# Patient Record
Sex: FEMALE | Employment: STUDENT | URBAN - METROPOLITAN AREA
[De-identification: names, ages, dates, MRNs, and addresses within clinical notes are randomized per-mention and may not be internally consistent; named-entity substitution may affect disease eponyms.]

---

## 2023-03-23 ENCOUNTER — ATHLETIC TRAINING SESSION (OUTPATIENT)
Dept: SPORTS MEDICINE | Facility: CLINIC | Age: 23
End: 2023-03-23
Payer: COMMERCIAL

## 2023-03-23 NOTE — PROGRESS NOTES
Date: 3/20/23 Initial Evaluation  SUBJECTIVE:  Annmarie Ruiz is a 22 y.o. female who complains of tightness and pain in her right shoulder. Mechanism of injury: No RISHI, no immediate symptoms. Symptoms have been intermittent since that time. Prior history of related problems: no prior problems with this area in the past, previous shoulder injury general tightness in posterior shoulder, right side of her neck and upper arm.4/10    OBJECTIVE:  Appearance: alert, well appearing, and in no distress.  Shoulder exam: soft tissue tenderness over bicipital groove, tightness of trapezius, pectoralis major, and SCM, contralateral shoulder exam is normal.    ASSESSMENT:  Overall shoulder muscle tightness.    PLAN:  rest the injured area as much as practical, stretch the affected area.    NEFTALI Bonilla, STEPHANY    [facility]  [email or web address]      Date: 3/21/23 Re-Eval  SUBJECTIVE:  Annmarie Ruiz is a 22 y.o. female who complains of tightness and pain in her right shoulder. Mechanism of injury: No RISHI, no immediate symptoms. Symptoms have been intermittent since that time. Prior history of related problems: no prior problems with this area in the past, previous shoulder injury general tightness in posterior shoulder, right side of her neck and upper arm.4/10 pain. Pt states bruising over pectoralis muscle is due to soft tissue mobilizations done with a tennis ball on her own.    OBJECTIVE:  Appearance: alert, well appearing, and in no distress.  Shoulder exam: soft tissue tenderness, swelling at the insertion of the pectoralis and bicipital groove, ecchymosis of pectoralis major and bicipital groove, bicipital groove tenderness.    ASSESSMENT:  Shoulder strain    PLAN:  Limit repetitions at practice, ice afterwards and be evaluated by  on 3/22/23.     NEFTALI Bonilla, STEPHANY    [facility]  [place]  [email or web address] [facility]     Athletic Training Daily Treatment Note   3/23/2023  Name: Annmarie Ruiz  Clinic Number: [id number]  Sport: Women's Tennis    Reason For Visit: Pec Strain  Affected Area: Right Shoulder    Ramya completed the following exercises, treatments, and modalities.    Modality/Manual Therapy Time   Game ready 10 min                             Exercise Reps/Sets/Time Weight #   Scapular wall slides x20    Scapula squeezes x20    Scapula in pockets x20    I,Y,T no weight on physio ball 3x8                                                   -A shoulder spica was applied but the athlete did not feel comfortable playing with it therefore, KT tape was applied pulling the shoulder into IR and ADD.    Ligia Restrepo, LAT, ATC    [facility]  [email or web address]

## 2023-03-28 ENCOUNTER — ATHLETIC TRAINING SESSION (OUTPATIENT)
Dept: SPORTS MEDICINE | Facility: CLINIC | Age: 23
End: 2023-03-28
Payer: COMMERCIAL

## 2023-03-28 DIAGNOSIS — M75.41 ROTATOR CUFF IMPINGEMENT SYNDROME OF RIGHT SHOULDER: Primary | ICD-10-CM

## 2023-03-28 NOTE — PROGRESS NOTES
[facility]     Athletic Training Daily Treatment Note   3/27/23  Name: Annmarie Ruiz  Clinic Number: [id number]  Sport: Women's Tennis    Reason For Visit: pec strain  Affected Area: Right Shoulder    Ramya completed the following exercises, treatments, and modalities.       Modality/Manual Therapy Time   Heat (pre) 20 min   Combo US & stim (post) 8min                            Exercise Reps/Sets/Time Weight #   Scapular wall slides x20    Scapula squeezes x20    I,Y,T on physioball 3x8    Seated banded row 3x10    Bird dogs 3x10    Scapula in pockets x20                                       Pt reported she experiences pain with sleeping, typing, writing, and serving the tennis ball. Pain patterns have moved to the medial border of scapula including the rhomboids and trapezius.   Plan: shut her down from all practices and matches until pain decreased and improvement is seen. I relayed the news to her  and he is against the plan and is aware that if he plays her AMA I can no longer help her injury if it worsens.       NEFTALI Bonilla, STEPHANY    [email or web address]  [email or web address]      3/28/23  Pt stated her pain is better but her exercises make it worse.  Ramya received the following  modalities and/or manual therapy techniques:    Modality/Manual Therapy Time   E-stim IFC    E-stim premod    NormateRUST     IASTM-    Cupping-  10 min   Heat-  20 min   Scraping-    GameReady-         NEFTALI Bonilla, STEPHANY    [email or web address]  [email or web address]

## 2023-03-29 NOTE — PROGRESS NOTES
3/29/2023 Assessment performed by Dr. Luna.   HPI:  Patient presents for right shoulder pain and a pectoralis strain sustained last week.     Physical exam:   She has visible ecchymosis on the anterior portion of her shoulder. Her ROM is WNL and experiences pain with external rotation and horizontal ADD. TTP over the distal border of the scapula, along the insertion of the pectoralis muscle, long head of bicep.  Strength and special tests were performed by Dr. Luna.     Assessment:  Rotator cuff impingement, subscapularis     Plan:  Pt is able to play this weekend at her matches with limited reps and limited serves.   Continue with shoulder maintenance rehab and treat pain for remainder of the season.   Assess muscle and mechanical imbalances once the season is over and focus on overcompensation issues.     NEFTALI Bonilla, ATC    [facility]  [facility]

## 2023-04-19 NOTE — PROGRESS NOTES
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 She reports resting until this past week when she started to serve again and had return of her right shoulder pain. She reports the pain originates in her neck and down her shoulder. It makes it difficult to turn her head to the right.       Physical Exam:  TTP in supraspinatus and subscapularis fossa.  ROM full with painful arc at 170 degrees in forward flexion and 130 degrees in abduction, and is limited with pain at T12 on the right.   Special tests: positive for empty can and neer's    MUSCULOSKELETAL EXAM:    TART (Tissue texture abnormality, Asymmetry,  Restriction of motion and/or Tenderness) changes:     Cervical Spine   C1 Neutral   C2 ERS LEFT   C3 ERS LEFT   C4 Neutral   C5 Neutral   C6 Neutral   C7 Neutral      Thoracic Spine   T1 Neutral   T2 Neutral   T3 FRS RIGHT   T4 Neutral   T5 Neutral   T6 FRS RIGHT   T7 Neutral   T8 Neutral   T9 FRS LEFT   T10 Neutral   T11 Neutral   T12 Neutral     Rib cage:      Lumbar Spine   L1 Neutral   L2 ERS LEFT   L3 Neutral   L4 Neutral   L5 ERS RIGHT       Key   F= Flexed   E = Extended   R = Rotated   S = Sidebent   TTA = tissue texture abnormality     Assessment:  Chronic Right Shoulder Pain (likely Shoulder Impingement)    Plan:   Discussed the long term fix of correcting biomechanics in physical therapy over the summer either here or back home depending on where she is for the summer. Today, performed OMT with good results and improvement in symptoms. Advised modifying activity and not serving until conference matchers next week and if unable to overhand serve then return to underhand serve. Agree with continuing conservative measures of heating pads, stretching, and advised warm  showers to relax the muscle.           This note was completed in the presence of the physician noted above

## 2023-05-03 ENCOUNTER — OFFICE VISIT (OUTPATIENT)
Dept: SPORTS MEDICINE | Facility: CLINIC | Age: 23
End: 2023-05-03
Payer: COMMERCIAL

## 2023-05-03 ENCOUNTER — HOSPITAL ENCOUNTER (OUTPATIENT)
Dept: RADIOLOGY | Facility: HOSPITAL | Age: 23
Discharge: HOME | End: 2023-05-03
Attending: ORTHOPAEDIC SURGERY
Payer: COMMERCIAL

## 2023-05-03 VITALS
BODY MASS INDEX: 26.5 KG/M2 | WEIGHT: 144 LBS | SYSTOLIC BLOOD PRESSURE: 113 MMHG | HEIGHT: 62 IN | HEART RATE: 70 BPM | DIASTOLIC BLOOD PRESSURE: 64 MMHG

## 2023-05-03 DIAGNOSIS — S43.431A SUPERIOR GLENOID LABRUM LESION OF RIGHT SHOULDER, INITIAL ENCOUNTER: ICD-10-CM

## 2023-05-03 DIAGNOSIS — M75.41 INTERNAL IMPINGEMENT OF RIGHT SHOULDER: Primary | ICD-10-CM

## 2023-05-03 DIAGNOSIS — M25.511 RIGHT SHOULDER PAIN, UNSPECIFIED CHRONICITY: ICD-10-CM

## 2023-05-03 PROCEDURE — 73030 XR SHOULDER COMPLETE 2 OR MORE VIEWS RIGHT: ICD-10-PCS | Mod: 26,RT,, | Performed by: RADIOLOGY

## 2023-05-03 PROCEDURE — 99204 PR OFFICE/OUTPT VISIT, NEW, LEVL IV, 45-59 MIN: ICD-10-PCS | Mod: S$GLB,,, | Performed by: ORTHOPAEDIC SURGERY

## 2023-05-03 PROCEDURE — 99204 OFFICE O/P NEW MOD 45 MIN: CPT | Mod: S$GLB,,, | Performed by: ORTHOPAEDIC SURGERY

## 2023-05-03 PROCEDURE — 73030 X-RAY EXAM OF SHOULDER: CPT | Mod: TC,RT

## 2023-05-03 PROCEDURE — 99999 PR PBB SHADOW E&M-EST. PATIENT-LVL III: CPT | Mod: PBBFAC,,, | Performed by: ORTHOPAEDIC SURGERY

## 2023-05-03 PROCEDURE — 99999 PR PBB SHADOW E&M-EST. PATIENT-LVL III: ICD-10-PCS | Mod: PBBFAC,,, | Performed by: ORTHOPAEDIC SURGERY

## 2023-05-03 PROCEDURE — 73030 X-RAY EXAM OF SHOULDER: CPT | Mod: 26,RT,, | Performed by: RADIOLOGY

## 2023-05-03 NOTE — PROGRESS NOTES
NEW PATIENT    HISTORY OF PRESENT ILLNESS   22 y.o. 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 She started having pain in November. She states she was doing exercises with bands and the pain started to go away. She had posterior shoulder pain. She states she also has neck pain. She has most pain during her serve during the late cocking phase. She has started top have pain again during matches and practice. She also has pain with her liliane in the late cocking phase.        Is affecting ADLs.  Pain is 7/10 at it's worst.        PAST MEDICAL HISTORY    History reviewed. No pertinent past medical history.    PAST SURGICAL HISTORY     Past Surgical History:   Procedure Laterality Date    FUSION, JOINT, WRIST Right     right       FAMILY HISTORY    History reviewed. No pertinent family history.    SOCIAL HISTORY    Social History     Socioeconomic History    Marital status: Single       MEDICATIONS    No current outpatient medications on file.    ALLERGIES     Review of patient's allergies indicates:  No Known Allergies      REVIEW OF SYSTEMS   Constitution: Negative. Negative for chills, fever and night sweats.   HENT: Negative for congestion and headaches.    Eyes: Negative for blurred vision, left vision loss and right vision loss.   Cardiovascular: Negative for chest pain and syncope.   Respiratory: Negative for cough and shortness of breath.    Endocrine: Negative for polydipsia, polyphagia and polyuria.   Hematologic/Lymphatic: Negative for bleeding problem. Does not bruise/bleed easily.   Skin: Negative for dry skin, itching and rash.   Musculoskeletal: Negative for falls. Positive for right shoulder pain  Gastrointestinal: Negative for abdominal pain and bowel incontinence.   Genitourinary: Negative for bladder incontinence and nocturia.   Neurological: Negative for disturbances in coordination, loss of balance and seizures.  "  Psychiatric/Behavioral: Negative for depression. The patient does not have insomnia.    Allergic/Immunologic: Negative for hives and persistent infections.     PHYSICAL EXAMINATION    Vitals: /64   Pulse 70   Ht 5' 2" (1.575 m)   Wt 65.3 kg (144 lb)   BMI 26.34 kg/m²     General: The patient appears active and healthy with no apparent physical problems.  No disturbance of mood or affect is demonstrated. Alert and Oriented.    HEENT: Eyes normal, pupils equally round, nose normal.    Resp: Equal and symmetrical chest rises. No wheezing    CV: Regular rate    Neck: Supple; nonpainful range of motion.    Extremities: no cyanosis, clubbing, edema, or diffuse swelling.  Palpable pulses, good capillary refill of the digits.  No coolness, discoloration, edema or obvious varicosities.    Skin: no lesions noted.    Lymphatic: no detected adenopathy in the upper or lower extremities.    Neurologic: normal mental status, normal reflexes, normal gait and balance.  Patient is alert and oriented to person, place and time.  No flaccidity or spasticity is noted.  No motor or sensory deficits are noted.  Light touch is intact    Orthopaedic:     SHOULDER EXAM - RIGHT    Inspection:   Normal skin color and appearance with no scars.  No muscle atrophy noted.  No scapular winging.      Palpation: No tenderness of the acromioclavicular joint, lateral edge of the acromion, biceps tendon, trapezius muscle or scapulothoracic bursa.  Tender around the posterior aspect of the shoulder.    ROM:      PROM:     FE - 180°    Abd/ER -  90°  Abd/IR -  45°   Add/ER -  60°     AROM:    FE - 180°    Abd/ER -  90°  Abd/IR -  45°   Add/ER -  60°  *pain ER       Tests:     - Yeung, - Neer's, - Cross Arm Adduction, + Paul[place], - Yerguson, - Speed. - Belly Press,  - Jobes *pain, - Lift Off    Stability: - sulcus, - apprehension, + relocation, - load and shift, - DLS      Motor:  Rotator cuff strength is 5/5 supraspinatus, 5/5 infraspinatus, " 5/5 subscapularis. Biceps, triceps and deltoid strength is 5/5.      Neuro     Distally there are no paresthesias, and sensation is intact to light touch in the median, ulnar, and radial distributions.  Reflexes are 2/2 biceps, triceps and brachioradialis.        IMAGING    X-ray Shoulder 2 or More Views Right  EXAMINATION:  XR SHOULDER COMPLETE 2 OR MORE VIEWS RIGHT    CLINICAL HISTORY:  Pain in right shoulder    FINDINGS:  Three views right shoulder: No fracture, dislocation, or bone destruction seen.    Electronically signed by: Venkat Arias MD  Date:    05/03/2023  Time:    14:05        IMPRESSION       ICD-10-CM ICD-9-CM   1. Internal impingement of right shoulder  M75.41 726.2   2. Right shoulder pain, unspecified chronicity  M25.511 719.41   3. Superior glenoid labrum lesion of right shoulder, initial encounter  S43.431A 840.7       MEDICATIONS PRESCRIBED      None    RECOMMENDATIONS     MRA of right shoulder ordered today with ABER view  RTC after MRA      All questions were answered, pt will contact us for questions or concerns in the interim.

## 2023-09-07 RX ORDER — AZITHROMYCIN 1 G/1
1 POWDER, FOR SUSPENSION ORAL ONCE
Qty: 1 PACKET | Refills: 0 | Status: CANCELLED | OUTPATIENT
Start: 2023-09-07 | End: 2023-09-07

## 2023-09-07 RX ORDER — AZITHROMYCIN 250 MG/1
TABLET, FILM COATED ORAL
Qty: 6 TABLET | Refills: 0 | Status: SHIPPED | OUTPATIENT
Start: 2023-09-07

## 2023-11-02 NOTE — PROGRESS NOTES
"CC: left foot pain    23 y.o. Female presents today for evaluation of her left foot pain. 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 She reports she was playing in a tennis match 3 weeks ago and following the match she felt a significant pain in her foot along the medial arch. She was evaluated in the training room and was placed in a wrong size walking boot for about a week which worsened symptoms. She then had improvement in symptoms with a better fitting walking boot but notes an increase in symptoms with associated cracking and swelling at her midfoot after continuous walking in [place] for a school trip. She is currently wearing a tall walking boot given to her by her school . When asked where her pain is located, she gestured along the plantar and dorsal aspects of her medial longitudinal arch, navicular and 1st metatarsal bones. She report her pain is now radiating into her great toe with associated numbness. She describes her pain as sharp and "pressure."    How long: Patient admits to experiencing left foot pain for the past 3 weeks  What makes it better: Patient admits to decreased pain with ice and rest  What makes it worse: Patient admits to increased pain with weightbearing and calf raises  Does it radiate: Patient admits to radiating pain into her great toe  Attempted treatments: Patient admits to the following attempted treatments: ice, tall walking boot and rest  Pain score: Patient admits to a pain score of 9/10   History of trauma/injury: Patient denies history of trauma/injury  Affecting ADLs: Patient admits to her pain affecting her ability to perform her ADLs  Any mechanical symptoms: Patient denies mechanical symptoms  Feelings of instability: Patient denies feelings of instability    PAST MEDICAL HISTORY:   No past medical history on file.    PAST SURGICAL HISTORY:   Past Surgical History:   Procedure Laterality Date    FUSION, JOINT, WRIST Right     right " "    FAMILY HISTORY:   No family history on file.    SOCIAL HISTORY:   Social History     Socioeconomic History    Marital status: Single     MEDICATIONS:   Current Outpatient Medications:     azithromycin (Z-NAGI) 250 MG tablet, Take 2 tablets by mouth on day 1; Take 1 tablet by mouth on days 2-5, Disp: 6 tablet, Rfl: 0    ALLERGIES:   Review of patient's allergies indicates:  No Known Allergies     PHYSICAL EXAMINATION:  /75   Pulse 73   Ht 5' 2" (1.575 m)   Wt 66.1 kg (145 lb 11.6 oz)   BMI 26.65 kg/m²   Vitals signs and nursing note have been reviewed.  General: In no acute distress, well developed, well nourished, no diaphoresis  Eyes: EOM full and smooth, no eye redness or discharge  HENT: normocephalic and atraumatic, neck supple, trachea midline, no nasal discharge, no external ear redness or discharge  Cardiovascular: no LE edema  Lungs: respirations non-labored, no conversational dyspnea   Neuro: alert & oriented   Skin: No rashes, warm and dry  Psychiatric: cooperative, pleasant, mood and affect appropriate for age  Msk: see below    ANKLE/FOOT: left   The affected ankle is compared to the contralateral ankle.    Observation:    There is no edema, erythema, or ecchymosis.   Inability to weight bear, shifts weight to her contralateral foot.   Abnormal, antalgic gait with attempted weight bearing.     ROM: (expected degree)  Active dorsiflexion to 20° bilaterally (*reproduces medial midfoot pain on the left*).    Active plantarflexion to 50° bilaterally (*reproduces medial midfoot pain on the left*).    Active ankle inversion to 35° bilaterally.    Active ankle eversion to 15° bilaterally (*reproduces medial midfoot pain on the left*).    Full active flexion/extension of the toes bilaterally.   Heel cords without tightness bilaterally.    Tenderness To Palpation:  Tenderness at the navicular bone  Tenderness along the 1st metatarsal shaft, worse at the proximal base  Tenderness along the course of " the posterior tibialis tendon onto its insertion    No tenderness at the ATFL, CFL, PTFL, or deltoid ligaments  No tenderness over the distal anterior syndesmosis, distal tibia/fibula, fibular head/shaft  No tenderness at medial or lateral malleoli   No tenderness at the Achilles tendon calcaneal insertion    Strength Testing:  Dorsiflexion - 5/5 on the right and 3/5 on the left (*reproduces medial midfoot pain on the left*)  Platarflexion - 5/5 on the right and 3/5 on the left (*reproduces medial midfoot pain on the left*)  Resisted Inversion - 5/5 on the right and 2/5 on the left (*reproduces medial midfoot pain on the left*)  Resisted Eversion - 5/5 on the right and 3/5 on the left (*reproduces medial midfoot pain on the left*)    Special Tests:  Anterior talar drawer - negative and without dimpling  Talar tilt - negative    Metatarsal squeeze test - positive  Midfoot stress test - positive    IMAGIN. X-ray ordered, 11/3/23, due to left foot pain  2. X-ray images were interpreted personally by me and then reviewed directly with patient.  3. My interpretation of imaging is no acute bony fracture or abnormality. No joint dislocation. No soft tissue swelling.    ASSESSMENT:      ICD-10-CM ICD-9-CM   1. 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 She now has inability to weight bear and localized pain at the navicular/1st metatarsal bone. Today's exam is concerning for a stress fracture at the navicular or 1st metatarsal bone and she will require an MRI of the left midfoot to definitively diagnose. Please see detailed plan below.    XRs ordered in the office today and images were personally interpreted and then reviewed with the patient. See above for further  detail.    2.   MRI of the left midfoot ordered to assess the above concerning pathology.     3.   Patient fitted for and provided crutches to assist with ambulation. Patient advised not to weight bear and use the crutches as assistance as she now has pain with ambulating in her walking boot and there is concern for a stress fracture. Additionally, patient fitted for an appropriate sized non-pneumatic tall walking boot to help immobilize the foot while using the crutches.    4.   Discussed pain management options, offered prescription of Meloxicam but patient deferred at this time and will continue with her current anti-inflammatory regimen as needed.    5.   Follow-up once MRI results obtained in the training room or sooner if needed.    All questions were answered to the best of my ability and all concerns were addressed at this time.

## 2023-11-03 ENCOUNTER — HOSPITAL ENCOUNTER (OUTPATIENT)
Dept: RADIOLOGY | Facility: HOSPITAL | Age: 23
Discharge: HOME | End: 2023-11-03
Attending: STUDENT IN AN ORGANIZED HEALTH CARE EDUCATION/TRAINING PROGRAM
Payer: COMMERCIAL

## 2023-11-03 ENCOUNTER — OFFICE VISIT (OUTPATIENT)
Dept: SPORTS MEDICINE | Facility: CLINIC | Age: 23
End: 2023-11-03
Payer: COMMERCIAL

## 2023-11-03 VITALS
BODY MASS INDEX: 26.82 KG/M2 | HEIGHT: 62 IN | DIASTOLIC BLOOD PRESSURE: 75 MMHG | HEART RATE: 73 BPM | SYSTOLIC BLOOD PRESSURE: 115 MMHG | WEIGHT: 145.75 LBS

## 2023-11-03 DIAGNOSIS — M79.672 ACUTE FOOT PAIN, LEFT: ICD-10-CM

## 2023-11-03 DIAGNOSIS — M79.672 ACUTE FOOT PAIN, LEFT: Primary | ICD-10-CM

## 2023-11-03 PROCEDURE — 99999 PR PBB SHADOW E&M-EST. PATIENT-LVL III: ICD-10-PCS | Mod: PBBFAC,,, | Performed by: STUDENT IN AN ORGANIZED HEALTH CARE EDUCATION/TRAINING PROGRAM

## 2023-11-03 PROCEDURE — 99999 PR PBB SHADOW E&M-EST. PATIENT-LVL III: CPT | Mod: PBBFAC,,, | Performed by: STUDENT IN AN ORGANIZED HEALTH CARE EDUCATION/TRAINING PROGRAM

## 2023-11-03 PROCEDURE — 73630 X-RAY EXAM OF FOOT: CPT | Mod: 26,LT,, | Performed by: RADIOLOGY

## 2023-11-03 PROCEDURE — 73630 XR FOOT COMPLETE 3 VIEW LEFT: ICD-10-PCS | Mod: 26,LT,, | Performed by: RADIOLOGY

## 2023-11-03 PROCEDURE — 99204 OFFICE O/P NEW MOD 45 MIN: CPT | Mod: S$GLB,,, | Performed by: STUDENT IN AN ORGANIZED HEALTH CARE EDUCATION/TRAINING PROGRAM

## 2023-11-03 PROCEDURE — 73630 X-RAY EXAM OF FOOT: CPT | Mod: TC,LT

## 2023-11-03 PROCEDURE — 99204 PR OFFICE/OUTPT VISIT, NEW, LEVL IV, 45-59 MIN: ICD-10-PCS | Mod: S$GLB,,, | Performed by: STUDENT IN AN ORGANIZED HEALTH CARE EDUCATION/TRAINING PROGRAM

## 2023-11-05 ENCOUNTER — HOSPITAL ENCOUNTER (OUTPATIENT)
Dept: RADIOLOGY | Facility: HOSPITAL | Age: 23
Discharge: HOME | End: 2023-11-05
Attending: STUDENT IN AN ORGANIZED HEALTH CARE EDUCATION/TRAINING PROGRAM
Payer: COMMERCIAL

## 2023-11-05 DIAGNOSIS — M79.672 ACUTE FOOT PAIN, LEFT: ICD-10-CM

## 2023-11-05 PROCEDURE — 73718 MRI LOWER EXTREMITY W/O DYE: CPT | Mod: TC,LT

## 2023-11-05 PROCEDURE — 73718 MRI FOOT (MIDFOOT) LEFT WITHOUT CONTRAST: ICD-10-PCS | Mod: 26,LT,, | Performed by: RADIOLOGY

## 2023-11-05 PROCEDURE — 73718 MRI LOWER EXTREMITY W/O DYE: CPT | Mod: 26,LT,, | Performed by: RADIOLOGY

## 2023-11-07 NOTE — PROGRESS NOTES
Writer called patient to inform her of her recent MRI results. Will start medrol dose pack to help decrease inflammation/pain. All questions answered.

## 2023-12-05 ENCOUNTER — ATHLETIC TRAINING SESSION (OUTPATIENT)
Dept: SPORTS MEDICINE | Facility: CLINIC | Age: 23
End: 2023-12-05
Payer: COMMERCIAL

## 2023-12-05 DIAGNOSIS — M79.672 LEFT FOOT PAIN: Primary | ICD-10-CM

## 2023-12-05 NOTE — PROGRESS NOTES
Objective:       General: Annmarie is well-developed, well-nourished, appears stated age, in no acute distress, alert and oriented to time, place and person.     Ramya completed:    [x]  INJURY TREATMENT   []  MAINTENANCE  DATE OF SERVICE: 12/4/2023  INJURY/CONDITON: Left Foot Weakness    Ramya received the selected modalities after being cleared for contradictions.  Ramya received education on potenital side effects of the selected modalities and agreed to treatment.      THERAPEUTIC EXERCISES:    Stretching Cardio Rehab Other   [x]Stretching - Active [x]Cardio - Bike [x]Rehab - Ankle/Foot []Agility []PNF   []Stretching - Dynamic []Cardio - Elliptical []Rehab - Knee []Balance []ROM - Active   []Stretching - Passive []Cardio - Jog/Run []Rehab - Hip []Blood Flow Restriction []ROM - Passive   []Stretching - PNF []Cardio - Treadmill []Rehab - Wrist/Hand [x]Foam Roller []RTP - Concussion Protocol   []Stretching - Static []Cardio - Upper Body Ergometer []Rehab - Elbow []Functional Exercises []RTP - Sport Specific    []Cardio - Walk []Rehab - Shoulder []Joint Mobilization []Strengthening Exercises     []Rehab - Neck/Spine []Manual Therapy []Other:     []Rehab - Back []Plyometric Exercises      []Rehab - Other       Comment:            Warm-Up Reps/Sets/Time Weight #   Bike 10 min    Foam Roll -- calf     Stretch -- calf 5 x 20s           Exercise Reps/Sets/Time Weight #   DL Calf Raise 4 x 15    SL Calf Raise 3 x 10    Toe Raises 3 x 15    DL Horizontal Line Hop 4 x 35s    DL Lateral Line Hop 4 x 35s    SL Horizontal Line Hop 4 x 35s    SL Lateral Line Hop 4 x 35s                     Comment:        Assessment:     Status: AT - Cleared to Exert    Date Seen:  12/4/2023    Date Out:  n/a    Date Cleared:  n/a      Plan:       1. Continue rehab in training room  2. Physician Referral: yes  3. ED Referral: no  4. Parent/Guardian Notified: No  5. All questions were answered, ath. will contact me for questions or  concerns in  the interim.  6.         Eligible to use School Insurance: Yes

## 2023-12-11 ENCOUNTER — OFFICE VISIT (OUTPATIENT)
Dept: SPORTS MEDICINE | Facility: CLINIC | Age: 23
End: 2023-12-11
Payer: COMMERCIAL

## 2023-12-11 VITALS
SYSTOLIC BLOOD PRESSURE: 115 MMHG | BODY MASS INDEX: 26.51 KG/M2 | WEIGHT: 144.06 LBS | HEIGHT: 62 IN | HEART RATE: 67 BPM | DIASTOLIC BLOOD PRESSURE: 66 MMHG

## 2023-12-11 DIAGNOSIS — M77.52 BURSITIS OF INTERMETATARSAL BURSA OF LEFT FOOT: Primary | ICD-10-CM

## 2023-12-11 PROCEDURE — 99999 PR PBB SHADOW E&M-EST. PATIENT-LVL III: ICD-10-PCS | Mod: PBBFAC,,, | Performed by: STUDENT IN AN ORGANIZED HEALTH CARE EDUCATION/TRAINING PROGRAM

## 2023-12-11 PROCEDURE — 20600 PR DRAIN/INJECT SMALL JOINT/BURSA: ICD-10-PCS | Mod: LT,S$GLB,, | Performed by: STUDENT IN AN ORGANIZED HEALTH CARE EDUCATION/TRAINING PROGRAM

## 2023-12-11 PROCEDURE — 99214 OFFICE O/P EST MOD 30 MIN: CPT | Mod: 25,S$GLB,, | Performed by: STUDENT IN AN ORGANIZED HEALTH CARE EDUCATION/TRAINING PROGRAM

## 2023-12-11 PROCEDURE — 99214 PR OFFICE/OUTPT VISIT, EST, LEVL IV, 30-39 MIN: ICD-10-PCS | Mod: 25,S$GLB,, | Performed by: STUDENT IN AN ORGANIZED HEALTH CARE EDUCATION/TRAINING PROGRAM

## 2023-12-11 PROCEDURE — 20600 DRAIN/INJ JOINT/BURSA W/O US: CPT | Mod: LT,S$GLB,, | Performed by: STUDENT IN AN ORGANIZED HEALTH CARE EDUCATION/TRAINING PROGRAM

## 2023-12-11 PROCEDURE — 99999 PR PBB SHADOW E&M-EST. PATIENT-LVL III: CPT | Mod: PBBFAC,,, | Performed by: STUDENT IN AN ORGANIZED HEALTH CARE EDUCATION/TRAINING PROGRAM

## 2023-12-11 RX ORDER — TRIAMCINOLONE ACETONIDE 40 MG/ML
40 INJECTION, SUSPENSION INTRA-ARTICULAR; INTRAMUSCULAR
Status: COMPLETED | OUTPATIENT
Start: 2023-12-11 | End: 2023-12-11

## 2023-12-11 RX ADMIN — TRIAMCINOLONE ACETONIDE 40 MG: 40 INJECTION, SUSPENSION INTRA-ARTICULAR; INTRAMUSCULAR at 01:12

## 2023-12-11 NOTE — PROGRESS NOTES
"CC: left foot pain    Ramya is here today for a follow up evaluation of her left foot pain. She reports a pain score of 6-7/10. She admits to compliance with wearing her short walking boot for 4-5 weeks. She denies any pain or issues with her boot. She reports pain improvement with her walking boot. She reports her pain has improved and she has been doing rehabilitation in the training room with her school . She reports she still experiences pain with prolonged walking. She reports her pain stops when she is able to "crack" for foot. Her pain is located within the intermetatarsal region between the 1st and 2nd metatarsal heads on the plantar aspect of the foot.    Recall from visit on 11/3/23  23 y.o. Female presents today for evaluation of her left foot pain. 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. She reports she was playing in a tennis match 3 weeks ago and following the match she felt a significant pain in her foot along the medial arch. She was evaluated in the training room and was placed in a wrong size walking boot for about a week which worsened symptoms. She then had improvement in symptoms with a better fitting walking boot but notes an increase in symptoms with associated cracking and swelling at her midfoot after continuous walking in [place] for a school trip. She is currently wearing a tall walking boot given to her by her school . When asked where her pain is located, she gestured along the plantar and dorsal aspects of her medial longitudinal arch, navicular and 1st metatarsal bones. She report her pain is now radiating into her great toe with associated numbness. She describes her pain as sharp and "pressure."    How long: Patient admits to experiencing left foot pain for the past 3 weeks  What makes it better: Patient admits to decreased pain with ice and rest  What makes it worse: Patient admits to increased pain with weightbearing and calf " raises  Does it radiate: Patient admits to radiating pain into her great toe  Attempted treatments: Patient admits to the following attempted treatments: ice, tall walking boot and rest  Pain score: Patient admits to a pain score of 9/10   History of trauma/injury: Patient denies history of trauma/injury  Affecting ADLs: Patient admits to her pain affecting her ability to perform her ADLs  Any mechanical symptoms: Patient denies mechanical symptoms  Feelings of instability: Patient denies feelings of instability    PAST MEDICAL HISTORY:   No past medical history on file.    PAST SURGICAL HISTORY:   Past Surgical History:   Procedure Laterality Date    FUSION, JOINT, WRIST Right     right     FAMILY HISTORY:   No family history on file.    SOCIAL HISTORY:   Social History     Socioeconomic History    Marital status: Single     Social Determinants of Health     Financial Resource Strain: Medium Risk (12/11/2023)    Overall Financial Resource Strain (CARDIA)     Difficulty of Paying Living Expenses: Somewhat hard   Food Insecurity: Food Insecurity Present (12/11/2023)    Hunger Vital Sign     Worried About Running Out of Food in the Last Year: Often true     Ran Out of Food in the Last Year: Never true   Transportation Needs: No Transportation Needs (12/11/2023)    PRAPARE - Transportation     Lack of Transportation (Medical): No     Lack of Transportation (Non-Medical): No   Physical Activity: Sufficiently Active (12/11/2023)    Exercise Vital Sign     Days of Exercise per Week: 6 days     Minutes of Exercise per Session: 70 min   Stress: No Stress Concern Present (12/11/2023)    [place] [employer] - Occupational Stress Questionnaire     Feeling of Stress : Not at all   Social Connections: Unknown (12/11/2023)    Social Connection and Isolation Panel [NHANES]     Frequency of Communication with Friends and Family: More than three times a week     Frequency of Social Gatherings with Friends and  "Family: More than three times a week     Active Member of Clubs or Organizations: Yes     Attends Club or Organization Meetings: More than 4 times per year     Marital Status: Never    Housing Stability: High Risk (12/11/2023)    Housing Stability Vital Sign     Unable to Pay for Housing in the Last Year: No     Number of Places Lived in the Last Year: 3     Unstable Housing in the Last Year: No     MEDICATIONS:   Current Outpatient Medications:     azithromycin (Z-NAGI) 250 MG tablet, Take 2 tablets by mouth on day 1; Take 1 tablet by mouth on days 2-5 (Patient not taking: Reported on 11/3/2023), Disp: 6 tablet, Rfl: 0    ALLERGIES:   Review of patient's allergies indicates:  No Known Allergies     PHYSICAL EXAMINATION:  /66   Pulse 67   Ht 5' 2" (1.575 m)   Wt 65.4 kg (144 lb 1.1 oz)   BMI 26.35 kg/m²   Vitals signs and nursing note have been reviewed.  General: In no acute distress, well developed, well nourished, no diaphoresis  Eyes: EOM full and smooth, no eye redness or discharge  HENT: normocephalic and atraumatic, neck supple, trachea midline, no nasal discharge, no external ear redness or discharge  Cardiovascular: no LE edema  Lungs: respirations non-labored, no conversational dyspnea   Neuro: alert & oriented   Skin: No rashes, warm and dry  Psychiatric: cooperative, pleasant, mood and affect appropriate for age  Msk: see below    ANKLE/FOOT: left   The affected ankle is compared to the contralateral ankle.    Observation:    There is no edema, erythema, or ecchymosis.   Normal gait (but reports pain with ambulation at the intermetatarsal bursa)    ROM: (expected degree)  Active dorsiflexion to 20° bilaterally  Active plantarflexion to 50° bilaterally  Active ankle inversion to 35° bilaterally.    Active ankle eversion to 15° bilaterally   Full active flexion/extension of the toes bilaterally.   Heel cords without tightness bilaterally.    Tenderness To Palpation:  Tenderness within the " "intermetatarsal space, at the bursa between the 1st and 2nd metatarsal heads.    Resolution of tenderness at the navicular bone  Resolution of tenderness along the 1st metatarsal shaft, worse at the proximal base  Resolution of tenderness along the course of the posterior tibialis tendon onto its insertion    No tenderness at the ATFL, CFL, PTFL, or deltoid ligaments  No tenderness over the distal anterior syndesmosis, distal tibia/fibula, fibular head/shaft  No tenderness at medial or lateral malleoli   No tenderness at the Achilles tendon calcaneal insertion    Strength Testing:  Dorsiflexion - 5/5 on the right and 5/5 on the left   Platarflexion - 5/5 on the right and 5/5 on the left   Resisted Inversion - 5/5 on the right and 5/5 on the left  Resisted Eversion - 5/5 on the right and 5/5 on the left     Special Tests:  Metatarsal squeeze test - ? positive  Daniel's sign test - negative    Functional Testing:  Calf raises - positive for reproduction of pain within the intermetatarsal space between the 1st and 2nd metatarsal heads    PROCEDURE:  Small joint injection  Intermetatarsal bursal injection of bursa between 1st and 2nd metatarsal heads (left foot)  Performed by: Cresencio Luna  Authorized by: Cresencio Luna   Consent Done?: Yes (Verbal and written)  Indications: Pain, at intermetatarsal bursa  Site marked: The procedure site was marked   Timeout: Prior to procedure the correct patient, procedure, and site was verified   Location: Intermetatarsal bursa, between 1st and 2nd metatarsal heads, left  Prep: Patient was prepped with Chlorhexidine and alcohol.  Skin anesthetic: Ethyl Chloride spray was used prior to skin puncture.  Ultrasound Guidance for needle placement: yes  Needle size: 27G, 1  Approach: Plantar  Procedure: A 27G 1" needle was used to enter the foot, adjacent to the intermetatarsal bursa under US guidance. A 1 cc mixture of 0.5 cc of 40 mg/ml triamcinolone acetonide and 0.5 cc of 0.1% " "lidocaine was injected into the left intermetatarsal bursa, between the 1st and 2nd metatarsal heads.  Medications: 40 mg triamcinolone acetonide 40 mg/mL  Patient tolerance: Patient tolerated the procedure well with no immediate complications    Description of ultrasound utilization for needle guidance:    Ultrasound guidance was used for needle localization with Pharmapod Edge 2, 9-L MHz linear probe(s). Images were saved and stored for documentation. Plantar foot vasculature was identified prior to injection. The left intermetatarsal bursa was well visualized. Dynamic visualization of the 27G 1" needle was continuous throughout the procedure and maintained good position and correct needle placement.      Triamcinolone:  NDC: [address]-1  LOT: [id number]  EXP: 2025     IMAGIN. X-ray previously obtained, 11/3/23, due to left foot pain  2. X-ray images were interpreted personally by me and then reviewed directly with patient.  3. My interpretation of imaging is no acute bony fracture or abnormality. No joint dislocation. No soft tissue swelling.    1. MRI previously obtained on 23 due to left foot pain  2. MRI images were reviewed personally by me and then directly with patient.  3. FINDINGS: LIGAMENTS: Dorsal and interosseous components of the Lisfranc ligament are intact.  Dorsal talonavicular, Lisfranc and dorsal calcaneocuboid ligaments are normal. TENDONS: Mild increased intrasubstance signal of the distal peroneus longus tendon with associated peritendinous edema/fluid and mild soft tissue edema tracking distally along the inferomedial aspect of the 1st metatarsal shaft.  Remaining visualized flexor and extensor tendons are normal BONES: No fractures.  No avascular necrosis.  No marrow infiltrative process. JOINTS/CARTILAGE: No high-grade partial or full-thickness chondral defects.  No osteochondral lesions.  Intact MTP collateral ligaments. MUSCLES: Normal bulk and signal intensity. MISCELLANEOUS: " Visualized plantar aponeurosis is normal.  Hallux plantar plate complex and lesser MTP plantar plates are normal.  Mild fluid distention of the 3rd intermetatarsal bursa.  4. IMPRESSION: Strain versus tendinosis/tenosynovitis of the distal peroneus longus with soft tissue edema tracking distally along the inferomedial aspect of the 1st metatarsal       Comments: I have personally reviewed and previously interpreted the imaging and I agree with the above radiology report.    ASSESSMENT:      ICD-10-CM ICD-9-CM   1. 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 Today's exam reflects improvement in symptoms s/p walking boot and rehabilitation in training room. However, she continues to endorse intermetatarsal bursa pain in between her 1st and 2nd metatarsal heads with prolonged walking/activity despite extensive rehabilitation in the training room. After discussion of treatment options, patient opted to try a intermetatarsal bursal injection. She tolerated the procedure well with improvement in symptoms. Please see detailed plan below.    Patient underwent intermetatarsal bursal injection of her left foot as detailed above. She tolerated the procedure well with improvement in symptoms.    2.   Patient advised to refrain from running or lower extremity exercise for the next 24-72 hours to allow the medicine/fullness sensation to improve. She should modified and do upper extremity activity at this time. Will reassess in the training room on Wednesday, 12/13/23 and likely clear to resume activity as tolerated pending her clinical exam.     3.   Patient advised she can continue rehabilitation in the training room, non-weight beating exercises  at this time until she is reassess on 12/13/23. Once cleared she can resume full rehabilitation as tolerated.     4.   Follow-up on 12/11/23 as detailed above in the training room or sooner if needed.    All questions were answered to the best of my ability and all concerns were addressed at this time.

## 2023-12-12 ENCOUNTER — TELEPHONE (OUTPATIENT)
Dept: SPORTS MEDICINE | Facility: CLINIC | Age: 23
End: 2023-12-12
Payer: COMMERCIAL

## 2023-12-12 NOTE — TELEPHONE ENCOUNTER
"Writer called patient to follow-up on symptoms s/p ultrasound guided injection. Patient reports fullness sensation post injection and "weird" feeling but not necessary pain as she previously appreciated. Will assess tomorrow in the training room. All questions answered.   "

## 2024-03-26 ENCOUNTER — ATHLETIC TRAINING SESSION (OUTPATIENT)
Dept: SPORTS MEDICINE | Facility: CLINIC | Age: 24
End: 2024-03-26
Payer: COMMERCIAL

## 2024-03-26 DIAGNOSIS — M25.511 RIGHT SHOULDER PAIN, UNSPECIFIED CHRONICITY: Primary | ICD-10-CM

## 2024-03-26 NOTE — PROGRESS NOTES
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    HPI  Patient notified AT of pain in right shoulder during practice on 3/25/2024. Pt stated that on Friday 03/22/24 that she had numbness and tingling down the right arm and possibly dislocated shoulder during tennis event in [place].    Review of Systems   All other systems reviewed and are negative.                Objective:       General: Annmarie is well-developed, well-nourished, appears stated age, in no acute distress, alert and oriented to time, place and person.             Back (L-Spine & T-Spine) / Neck (C-Spine) Exam     Tenderness   The patient is tender to palpation of the right trapezial and right scapular.   Right Shoulder Exam     Tenderness   The patient is tender to palpation of the supraspinatus.    Range of Motion   Active abduction:  normal   Passive abduction:  normal   Extension:  normal   Forward Flexion:  abnormal   Forward Elevation: abnormal  Adduction: abnormal    Tests & Signs   Drop arm: positive  Lift Off Sign: positive    Comments:  - Pt has forward shoulder roll with resting position    - Pt possibly has thoracic compression outlet syndrome    Left Shoulder Exam   Left shoulder exam is normal.    Muscle Strength   The patient has normal left shoulder strength.       Muscle Strength   Right Upper Extremity   Shoulder Abduction: 4/5   Shoulder Internal Rotation: 5/5   Shoulder External Rotation: 5/5   Supraspinatus: 4/5   Subscapularis: 2/5   Biceps: 5/5   Triceps:  5/5    Dermatomes:   Left  C4: negative  C5: negative  C6: negative  C7: negative  C8: negative  T1: negative   Right  C4: negative  C5: negtaive  C6 negative  C7: negative  C8: negative  T1: negative     Myotomes:  Left  C4: negative  C5: negative  C6: negative  C7: negative  C8: negative  T1: negative  Right  C4: negative  C5: negative  C6:  negative  C7: negative  C8: negative  T1: negative       Special Test:   - Jerk Test : negative (bilateral)  - Axial Load: negative (bilateral)  - Arm Drop: (L) negative (R) positive  - Sulcus: negative (bilateral)   MMT:  - Internal rotation - 5/5 (bilateral)  - External rotation - 5/5 ( bilateral)       Assessment:     Left Shoulder Impingement    Status: F - Full Participation    Date Seen:  3/26/24    Date of Injury:  03/22/24    Date Out:  n/a    Date Cleared:  n/a      Plan:       1. See Doctor on 4/3/24 , strengthen rotator cuff , relax shoulder & neck muscles  2. Physician Referral: yes  3. ED Referral:no  4. Parent/Guardian Notified: No  5. All questions were answered, ath. will contact me for questions or concerns in  the interim.  6.         Eligible to use School Insurance: Yes

## 2024-04-03 DIAGNOSIS — M25.511 CHRONIC RIGHT SHOULDER PAIN: Primary | ICD-10-CM

## 2024-04-03 DIAGNOSIS — G89.29 CHRONIC RIGHT SHOULDER PAIN: Primary | ICD-10-CM

## 2024-05-02 DIAGNOSIS — M25.511 RIGHT SHOULDER PAIN, UNSPECIFIED CHRONICITY: Primary | ICD-10-CM

## 2024-05-09 ENCOUNTER — CLINICAL SUPPORT (OUTPATIENT)
Dept: REHABILITATION | Facility: HOSPITAL | Age: 24
End: 2024-05-09
Attending: STUDENT IN AN ORGANIZED HEALTH CARE EDUCATION/TRAINING PROGRAM
Payer: COMMERCIAL

## 2024-05-09 DIAGNOSIS — M54.2 NECK PAIN, CHRONIC: ICD-10-CM

## 2024-05-09 DIAGNOSIS — G89.29 CHRONIC RIGHT SHOULDER PAIN: Primary | ICD-10-CM

## 2024-05-09 DIAGNOSIS — M25.511 CHRONIC RIGHT SHOULDER PAIN: Primary | ICD-10-CM

## 2024-05-09 DIAGNOSIS — G89.29 NECK PAIN, CHRONIC: ICD-10-CM

## 2024-05-09 DIAGNOSIS — R29.3 ABNORMAL POSTURE: ICD-10-CM

## 2024-05-09 DIAGNOSIS — M25.511 RIGHT SHOULDER PAIN, UNSPECIFIED CHRONICITY: ICD-10-CM

## 2024-05-09 PROCEDURE — 97162 PT EVAL MOD COMPLEX 30 MIN: CPT

## 2024-05-09 PROCEDURE — 97112 NEUROMUSCULAR REEDUCATION: CPT

## 2024-05-09 PROCEDURE — 97140 MANUAL THERAPY 1/> REGIONS: CPT

## 2024-05-10 ENCOUNTER — OFFICE VISIT (OUTPATIENT)
Dept: SPORTS MEDICINE | Facility: CLINIC | Age: 24
End: 2024-05-10
Payer: COMMERCIAL

## 2024-05-10 ENCOUNTER — TELEPHONE (OUTPATIENT)
Dept: SPORTS MEDICINE | Facility: CLINIC | Age: 24
End: 2024-05-10
Payer: COMMERCIAL

## 2024-05-10 ENCOUNTER — HOSPITAL ENCOUNTER (OUTPATIENT)
Dept: RADIOLOGY | Facility: HOSPITAL | Age: 24
Discharge: HOME | End: 2024-05-10
Attending: STUDENT IN AN ORGANIZED HEALTH CARE EDUCATION/TRAINING PROGRAM
Payer: COMMERCIAL

## 2024-05-10 VITALS
HEIGHT: 62 IN | HEART RATE: 71 BPM | BODY MASS INDEX: 25.66 KG/M2 | SYSTOLIC BLOOD PRESSURE: 116 MMHG | WEIGHT: 139.44 LBS | DIASTOLIC BLOOD PRESSURE: 72 MMHG

## 2024-05-10 DIAGNOSIS — M25.511 RIGHT SHOULDER PAIN, UNSPECIFIED CHRONICITY: ICD-10-CM

## 2024-05-10 DIAGNOSIS — M25.511 CHRONIC RIGHT SHOULDER PAIN: Primary | ICD-10-CM

## 2024-05-10 DIAGNOSIS — G54.0 BRACHIAL PLEXUS NEUROPATHY OF RIGHT UPPER EXTREMITY: ICD-10-CM

## 2024-05-10 DIAGNOSIS — G89.29 CHRONIC RIGHT SHOULDER PAIN: Primary | ICD-10-CM

## 2024-05-10 PROCEDURE — 3078F DIAST BP <80 MM HG: CPT | Mod: CPTII,S$GLB,, | Performed by: STUDENT IN AN ORGANIZED HEALTH CARE EDUCATION/TRAINING PROGRAM

## 2024-05-10 PROCEDURE — 3074F SYST BP LT 130 MM HG: CPT | Mod: CPTII,S$GLB,, | Performed by: STUDENT IN AN ORGANIZED HEALTH CARE EDUCATION/TRAINING PROGRAM

## 2024-05-10 PROCEDURE — 99999 PR PBB SHADOW E&M-EST. PATIENT-LVL III: CPT | Mod: PBBFAC,,, | Performed by: STUDENT IN AN ORGANIZED HEALTH CARE EDUCATION/TRAINING PROGRAM

## 2024-05-10 PROCEDURE — 73030 X-RAY EXAM OF SHOULDER: CPT | Mod: TC,RT

## 2024-05-10 PROCEDURE — 1160F RVW MEDS BY RX/DR IN RCRD: CPT | Mod: CPTII,S$GLB,, | Performed by: STUDENT IN AN ORGANIZED HEALTH CARE EDUCATION/TRAINING PROGRAM

## 2024-05-10 PROCEDURE — 99215 OFFICE O/P EST HI 40 MIN: CPT | Mod: 25,S$GLB,, | Performed by: STUDENT IN AN ORGANIZED HEALTH CARE EDUCATION/TRAINING PROGRAM

## 2024-05-10 PROCEDURE — 73030 X-RAY EXAM OF SHOULDER: CPT | Mod: 26,RT,, | Performed by: RADIOLOGY

## 2024-05-10 PROCEDURE — 1159F MED LIST DOCD IN RCRD: CPT | Mod: CPTII,S$GLB,, | Performed by: STUDENT IN AN ORGANIZED HEALTH CARE EDUCATION/TRAINING PROGRAM

## 2024-05-10 PROCEDURE — 3008F BODY MASS INDEX DOCD: CPT | Mod: CPTII,S$GLB,, | Performed by: STUDENT IN AN ORGANIZED HEALTH CARE EDUCATION/TRAINING PROGRAM

## 2024-05-10 PROCEDURE — 20611 DRAIN/INJ JOINT/BURSA W/US: CPT | Mod: RT,S$GLB,, | Performed by: STUDENT IN AN ORGANIZED HEALTH CARE EDUCATION/TRAINING PROGRAM

## 2024-05-10 RX ORDER — TRIAMCINOLONE ACETONIDE 40 MG/ML
40 INJECTION, SUSPENSION INTRA-ARTICULAR; INTRAMUSCULAR
Status: COMPLETED | OUTPATIENT
Start: 2024-05-10 | End: 2024-05-10

## 2024-05-10 RX ADMIN — TRIAMCINOLONE ACETONIDE 40 MG: 40 INJECTION, SUSPENSION INTRA-ARTICULAR; INTRAMUSCULAR at 05:05

## 2024-05-10 NOTE — TELEPHONE ENCOUNTER
Spoke to patient regarding message below. She is scheduled for today at 3:40.     Jossie Diaz, OTC  Clinical Assistant to Dr. Cresencio Luna DO  [facility]         ----- Message from Cresencio Luna DO sent at 5/10/2024  8:51 AM CDT -----  Regarding: Schedule for Right Shoulder Follow-Up/Injection  Hi Team,    Can we schedule Ramya for follow-up of her chronic right shoulder pain with a likely injection ahead of her national's trip on 5/12/24. I figure the best time would be to double book our 3:40 pm unless you see a more optimal time on our schedule. Thank you and sorry for the last minute add on.     Cresencio Marsh

## 2024-05-10 NOTE — PROGRESS NOTES
PROCEDURE:  Large Joint Injection  Glenohumeral joint, right    Performed by: Cresencio Luna  Authorized by: Cresencio Luna  Consent Done?: Yes (Verbal)  Indications: Pain  Site marked: The procedure site was marked   Timeout: Prior to procedure the correct patient, procedure, and site was verified.    Location: Glenohumeral joint, right  Prep: Prep: Patient was prepped with Chlorhexidine and alcohol.  Skin anesthetic: Ethyl Chloride spray was used prior to skin puncture.  Ultrasound guidance for needle placement: Yes  Needle size: 22 G, 2.5  Approach: Lateral  Procedure: After skin anesthetic was applied, the 22G, 2.5 needle was used to enter the glenohumeral joint under US guidance. A 1 cc mixture of 1 cc of 40 mg/ml triamcinolone acetonide and 1 cc of 0.2% lidocaine was injected into the glenohumeral joint.   Medications: 40 mg triamcinolone acetonide 40 mg/mL  Patient tolerance: Patient tolerated the procedure well with no immediate complications    Ultrasound guidance was used for needle localization with SonoSite Edge 2, 15-6 MHz (L). Images were saved and stored for documentation. The shoulder structures were well visualized. Dynamic visualization of the 22g x 2.5 needles was continuous throughout the procedure and maintained good position and correct needle placement.    Triamcinolone:  NDC: [address]-2  LOT: [id number]  EXP: January 2026

## 2024-05-10 NOTE — PROGRESS NOTES
CC: right shoulder pain    23 y.o. Female presents today for evaluation of her right shoulder pain. 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 She is here today with her friend who was present for the duration of the visit. She reports her right shoulder pain began a year ago and she denies a known mechanism of injury. She reports her right shoulder pain has gradually worsened since its onset. She reports this past February/March her right shoulder pain significantly worsened and became difficult to tolerate. She reports she has missed 2 games this season and had to modify her play for numerous other games secondary to her right shoulder pain. She reports she leaves for [place] [employer] national [place] tournament on 5[place]12/24. She describes her pain as sharp, deep within her shoulder joint and posteriorly (she gestures within the subacromial space). She reports the sharp pain is present at all times. She reports she has been doing rehab at school with her  at [employer] with minimal to mild improvement in symptoms. She recently started physical therapy as well to help assist with her school rehabilitation.     How long: Patient admits to experiencing right shoulder pain for the past year  What makes it better: Patient admits to decreased pain with ice, diclofenac, and rehab (with her /physical therapy)  What makes it worse: Patient admits to increased pain with tennis activity  Does it radiate: Patient admits to radiating pain  Attempted treatments: Patient admits to the following attempted treatments: ice, diclofenac, rehab with her school , and physical therapy  History of trauma/injury: Patient denies history of trauma/injury  Pain score: Patient admits to a pain score of 9/10 at rest and 10/10 at its worst  Any mechanical symptoms: Patient admits to mechanical symptoms (popping)  Feelings of instability: Patient admits to  feelings of  "instability  Problems with ADLs: Patient admits to her pain affecting her ability to perform her ADLs    PAST MEDICAL HISTORY:   No past medical history on file.    PAST SURGICAL HISTORY:   Past Surgical History:   Procedure Laterality Date    FUSION, JOINT, WRIST Right     right     FAMILY HISTORY:   No family history on file.    SOCIAL HISTORY:   Social History     Socioeconomic History    Marital status: Single   Tobacco Use    Smoking status: Never    Smokeless tobacco: Never     Social Determinants of Health     Financial Resource Strain: Medium Risk (12/11/2023)    Overall Financial Resource Strain (CARDIA)     Difficulty of Paying Living Expenses: Somewhat hard   Food Insecurity: Food Insecurity Present (12/11/2023)    Hunger Vital Sign     Worried About Running Out of Food in the Last Year: Often true     Ran Out of Food in the Last Year: Never true   Transportation Needs: No Transportation Needs (12/11/2023)    PRAPARE - Transportation     Lack of Transportation (Medical): No     Lack of Transportation (Non-Medical): No   Physical Activity: Sufficiently Active (12/11/2023)    Exercise Vital Sign     Days of Exercise per Week: 6 days     Minutes of Exercise per Session: 70 min   Stress: No Stress Concern Present (12/11/2023)    [place] [employer] - Occupational Stress Questionnaire     Feeling of Stress : Not at all   Housing Stability: High Risk (12/11/2023)    Housing Stability Vital Sign     Unable to Pay for Housing in the Last Year: No     Number of Places Lived in the Last Year: 3     Unstable Housing in the Last Year: No     MEDICATIONS:   Current Outpatient Medications:     azithromycin (Z-NAGI) 250 MG tablet, Take 2 tablets by mouth on day 1; Take 1 tablet by mouth on days 2-5 (Patient not taking: Reported on 11/3/2023), Disp: 6 tablet, Rfl: 0    ALLERGIES:   Review of patient's allergies indicates:  No Known Allergies     PHYSICAL EXAMINATION:  /72   Pulse 71   Ht 5' 2" " (1.575 m)   Wt 63.3 kg (139 lb 7.1 oz)   BMI 25.50 kg/m²   Vitals signs and nursing note have been reviewed.  General: In no acute distress, well developed, well nourished, no diaphoresis  Eyes: EOM full and smooth, no eye redness or discharge  HENT: normocephalic and atraumatic, neck supple, trachea midline, no nasal discharge, no external ear redness or discharge  Cardiovascular: 2+ and symmetric radial bilaterally, no LE edema  Lungs: respirations non-labored, no conversational dyspnea   Neuro: alert & oriented  Skin: No rashes, warm and dry  Psychiatric: cooperative, pleasant, mood and affect appropriate for age  Msk: see below    SHOULDER: RIGHT  The affected shoulder is compared to the contralateral shoulder.    Observation:    CERVICAL SPINE  Normal head carriage. Normal thoracic kyphosis.  Full AROM in flexion, extension, sidebending (*with reproduction of shoulder pain with left sidebending*), and rotation.    SHOULDER  No ecchymosis, edema, or erythema throughout the shoulder girdle.  No sternal, clavicular, or acromial deformities bilaterally.  No asymmetry of shoulders bilaterally.    ROM (* = with pain):  Active flexion to 180° on left and 170° on right (*).   Active abduction to 180° on left and 130° on right (*).    Active internal rotation to T7 on left and to ipsilateral iliac crest on right, unable to internally rotate her shoulder behind her back (*).    Active external rotation to T4 on left and T2 on right (*).    No scapular dyskinesia or winging.    Tenderness:  Tenderness along the C6 spinous process midline and along the adjacent right cervical paraspinal muscle  Mild tenderness along the clavicle  Tenderness along the lateral humerus   Tenderness along the anterior humerus, within the bicipital groove  Tenderness within the subacromial space    No tenderness at the AC joint  No tenderness at the coracoid  No tenderness within the supraspinatus fossa  No tenderness along the medial  periscapular border    Strength Testing:  Deltoid - 5/5 on left and 3/5 on right (*)  Biceps - 5/5 on left and 5/5 on right  Triceps - 5/5 on left and 5/5 on right  Wrist extension - 5/5 on left and 5/5 on right  Wrist flexion - 5/5 on left and 4/5 on right (*)   - 5/5 on left and 5/5 on right    Special Tests:  Empty can test - positive  Full can test - positive    Resisted internal rotation - positive  Resisted external rotation - positive    Neer's test - negative  Hawkin's-Homero test - negative    OElies test - positive    Biceps load 1 test - negative  Biceps load 2 test - negative  Crank test - negative    Anterior apprehension test - negative  Relocation test    Adson test - positive  Villavicencio's test - positive  Helga's test - positive    Neurovascular Exam:  2+ radial pulses BL  Spurlings test - negative  Negative Tinel's at coracoid   Capillary refill intact <2 seconds in all digits bilaterally    IMAGIN. X-ray ordered, 5/10/24, due to right shoulder pain  2. X-ray images were interpreted personally by me and then reviewed directly with patient.  3. My interpretation of imaging is no acute bony fracture or abnormality. No joint dislocation. No soft tissue swelling.    ASSESSMENT:      ICD-10-CM ICD-9-CM   1. Chronic right shoulder pain  M25.511 719.41    G89.29 338.29   2. 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 She also reports associated neck pain and numbness into her digits. Today's exam is reflects chronic right shoulder pain that has been refractory to extensive rehabilitation, > 6 weeks. Her exam is concerning for a possible brachial plexopathy secondary to thoracic outlet syndrome, therefore she will require an MRI of the brachial plexus to  definitively diagnose. Additionally, her exam is concerning for rotator cuff pathology in conjunction with a labral tear. She will therefore require an MRI of the right shoulder to definitively diagnose this pathology as well. In the interim, will treat conservatively while awaiting MRI results. Please see detailed plan below.     XRs ordered in the office today and images were personally interpreted and then reviewed with the patient. See above for further detail.    2.   MRI of the right brachial plexus and MRI of the right shoulder ordered to assess the above concerning pathology.     3.   In the interim, agree with continuation of rehabilitation with her school  in conjunction with physical therapy.     4.   Discussed pain management options, patient deferred a medrol dose pack and would like to undergo a CSI under ultrasound guidance today. She tolerated the CSI under ultrasound guidance procedure well, without complication, and was advised on follow-up instructions. Please see separate procedure note for more details    5.   Agree with continuation of activity as tolerated; she should allow pain to be her guide.     6.   Will continue to monitor her clinical progress and follow-up in the training room.      All questions were answered to the best of my ability and all concerns were addressed at this time.    I spent a total of 40 minutes on the day of the visit.  This includes face to face time and non-face to face time preparing to see the patient (eg, review of tests), obtaining and/or reviewing separately obtained history, documenting clinical information in the electronic or other health record, independently interpreting results and communicating results to the patient/family/caregiver, or care coordinator.

## 2024-05-10 NOTE — TELEPHONE ENCOUNTER
Writer called patient to discuss her chronic right shoulder pain and potential treatment interventions ahead of her trip to [employer] for tennis. She deferred a medrol dose pack at this time and would like to try an ultrasound guided CSI. Will have our staff reach to schedule this appointment ahead of her trip on 5/12/24.

## 2024-05-13 NOTE — PLAN OF CARE
[facility]   Physical Therapy Initial Evaluation      Name: Annmarie Bui Ruiz  Clinic Number: [id number]    Therapy Diagnosis:   Encounter Diagnoses   Name Primary?    Right shoulder pain, unspecified chronicity     Chronic right shoulder pain Yes    Abnormal posture     Neck pain, chronic         Physician: Cresencio Luna DO    Physician Orders: PT Eval and Treat   Medical Diagnosis from Referral: Right shoulder pain, unspecified chronicity [M25.511]   Evaluation Date: 5/9/2024  Authorization Period Expiration: 7/31/24  Plan of Care Expiration: 12/31/24  Progress Note Due: 6/9/24  Date of Surgery: n/a  Visit # / Visits authorized: 1/ 1   FOTO: 1/ 3    Precautions: Standard     Time In: 2:25  Time Out: 3:30  Total Billable Time: 55 minutes    Subjective     Date of onset: 1 year    History of current condition - Ramya reports: has been dealing with shoulder pain for over a year in her right shoulder. She has been trying to just manage it throughout the season but still getting the same symptoms. She was playing in [employer] and started getting numbness/tingling into the hand on the ulnar side and was having trouble with gripping. Took a couple days off which helped with the symptoms but hurting again after playing the other day. Is going to [employer] this weekend and then not playing for the summer. She is currently just getting the pain in her shoulder but also gets neck symptoms at times.     Falls: none    Prior Therapy: for foot/shoulder  Social History: from [facility], staying in the dorms  Occupation: [employer] , [employer]   Prior Level of Function: independent  Current Level of Function: pain with playing, shoulder motion    Pain:  Current 4/10, worst 10/10, best 0/10   Location: right shoulder  Description: Aching, Dull, Numb, and Sharp  Aggravating Factors: Extension, Flexing, and playing tennis  Easing Factors: pain medication, ice, and rest    Patients goals:  return to full PLOF, full tennis playing, and preparation for nationals tournament this weekend.      Medical History:   No past medical history on file.    Surgical History:   Annmarie Ruiz  has a past surgical history that includes fusion, joint, wrist (Right).    Medications:   Annmarie has a current medication list which includes the following prescription(s): azithromycin.    Allergies:   Review of patient's allergies indicates:  No Known Allergies     Objective      Posture: decreased thoracic kyphosis, anterior shoulder positioning, abducted right scapula.     Cervical Range of Motion:    Degrees(%) Pain   Flexion 40 Pulling in neck     Extension 40 pinching     Right Rotation 80 Pinching on right      Left Rotation 90 none     Right Side Bending 20 Pinching on right    Left Side Bending 30 none   C0-C1 Flex normal none   C0-C1 Ext limited none   C0-C1 Sidebending Decreased on right  Painful on right    C1-2 Rotation Decreased right  Painful on right    C1-3 Rotation nt nt       Cervical joint mobility: decreased right cervical side glide, upper cervical right SB    Thoracic mobility: decreased CT junction    Sensation: decreased along right 4/5th finger    Flexibility: decreased pec minor length on right     Passive Range of Motion:   Shoulder Right Left   Flexion 160* 180   Abduction 120* 180   ER at 0 35* 50   ER at 90 70* 100   IR 40* 60      Active Range of Motion:   Shoulder Right Left   Flexion 150* 180   Abduction nt nt   ER at 0 30* 50   ER at 90 nt nt   IR nt nt   Reach behind head nt nt   Reach behind back  nt nt     Strength:  Shoulder Right Left   Flexion 4-/5 5/5   Abduction 3+/5* 5/5   ER 3-/5* 5/5   IR 4/5* 5/5   Serratus Anterior nt nt   Middle Trap nt nt   Low Trap nt nt       Special Tests:  Impingement Cluster:   Right Left   Hawkin's Kenndy positive  neg   Painful Arc neg neg   Resisted ER positive neg     Joint Mobility: decreased posterior/inferior    Palpation: TTP throughout right  shoulder, right upper cervical    Intake Outcome Measure for FOTO shoulder Survey    Therapist reviewed FOTO scores for Annmarie Ruiz on 5/9/2024.   FOTO report - see Media section or FOTO account episode details.    Intake Score: see media         Treatment     Total Treatment time (time-based codes) separate from Evaluation: 40 minutes     Ramya received the treatments listed below:      manual therapy techniques: Joint mobilizations were applied to the: shoulder, CT junction, thoracic spine for 15 minutes, including:  CT HVLAT  CT junction mobilizations  Right Upper cervical MET  Posterior shoulder mobilizations    neuromuscular re-education activities to improve: Proprioception, Posture, and motor control for 20 minutes. The following activities were included:  Shoulder IR/ER isometrics  ER walkouts  IR walkouts   Subscap isometrics increasing ER range  Pt education  HEP      Patient Education and Home Exercises     Education provided:   - HEP  - reducing irritability  - reaching out to doctor  - warm up prior to matches    Written Home Exercises Provided: yes. Exercises were reviewed and Ramya was able to demonstrate them prior to the end of the session.  Ramya demonstrated good  understanding of the education provided. See EMR under Patient Instructions for exercises provided during therapy sessions.    Assessment     Annmarie is a 23 y.o. female referred to outpatient Physical Therapy with a medical diagnosis of Right shoulder pain, unspecified chronicity [M25.511] . Patient presents with decreased shoulder range, decreased strength, abnormal posture, adverse neural tension, high irritability, and pain affecting everyday activities. We discussed reaching out to doctor about getting potential injection due to high irritability and tournament this weekend, but will reach out to doctor and discuss.     Patient prognosis is Good.   Patient will benefit from skilled outpatient Physical Therapy to  address the deficits stated above and in the chart below, provide patient /family education, and to maximize patientt's level of independence.     Plan of care discussed with patient: Yes  Patient's spiritual, cultural and educational needs considered and patient is agreeable to the plan of care and goals as stated below:     Anticipated Barriers for therapy: scheduling, school, sport    Medical Necessity is demonstrated by the following  History  Co-morbidities and personal factors that may impact the plan of care [x] LOW: no personal factors / co-morbidities  [] MODERATE: 1-2 personal factors / co-morbidities  [] HIGH: 3+ personal factors / co-morbidities    Moderate / High Support Documentation:   Co-morbidities affecting plan of care:     Personal Factors:   age     Examination  Body Structures and Functions, activity limitations and participation restrictions that may impact the plan of care [] LOW: addressing 1-2 elements  [] MODERATE: 3+ elements  [x] HIGH: 4+ elements (please support below)    Moderate / High Support Documentation: range, strength, joint mobility, motor control     Clinical Presentation [] LOW: stable  [x] MODERATE: Evolving  [] HIGH: Unstable     Decision Making/ Complexity Score: moderate       GOALS: Short Term Goals:  2-6 weeks  1.Report decreased shoulder pain < / =  3/10  to increase tolerance for overhead motion  2. Increase PROM full pain free   3. Increased strength by 1/3 MMT grade in shoulder to increase tolerance for ADL and work activities.  4. Pt to tolerate HEP to improve ROM and independence with ADL's    Long Term Goals: 6-35 weeks  1.Report decreased shoulder pain  < / =  0 /10  to increase tolerance for overhead motion, tennis  2.Increase AROM to full pain free  3.Increase strength to >/= 4/5 in shoulder to increase tolerance for ADL and work activities.  4. Pt goal: return to full PLOF, full tennis playing, and preparation for nationals tournament this weekend.   5. Pt  will have improved gcode of CJ (20-40% limited) on FOTO shoulder in order to demonstrate true functional improvement.    Plan     Plan of care Certification: 5/9/2024 to 12/31/24.    Outpatient Physical Therapy 1-2 times weekly for 6-40 weeks to include the following interventions: Manual Therapy, Moist Heat/ Ice, Neuromuscular Re-ed, Patient Education, Self Care, Therapeutic Activities, and Therapeutic Exercise.     Harman Mendez, PT        Physician's Signature: _________________________________________ Date: ________________

## 2024-08-28 DIAGNOSIS — R63.0 LOSS OF APPETITE: ICD-10-CM

## 2024-08-28 DIAGNOSIS — R53.83 LOW ENERGY: Primary | ICD-10-CM

## 2024-08-28 NOTE — PROGRESS NOTES
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 She reports she her appetite has been poor since returning to campus making it difficult for her to take in the necessary calories she needs to keep up with tennis activity.     She reports she only eats one and sometimes two times a day. She reports she has no appetite, feels full, and is nauseous after eating. She reports she has not vomited with eating since returning to campus. She does however admit this is an on and off issue that originated prior to the end of the school year last year. She reports for 3 weeks she could not eat well and even forced herself to vomit by sticking her finger in the back of her throat.     She reports her loss of appetite and nausea seem to be affiliated with stressful events in her life, such as family stressors. She reports its improving and her appetite is now improving. She reports she has been able to play tennis and just modifies when she feels weak due to lack of adequate fuel.     Physical Exam:  General: appears well nourished and well developed  Lungs: no increase in respiratory distress with talking  Heart: no LE edema  Neuro: alert & oriented    Assessment:  1. Low energy    2. Loss of appetite        Plan:   Patient's presentation is concerning for stress induced loss of appetite leading to occasional vomiting. This presents like potential bulimia. Advised patient the importance of a routine diet, focusing on eating 3 meals a day in conjunction with adequate hydration and proper sleep. Would like to refer patient to sports psychology and obtain lab work to ensure no electrolyte imbalance but patient unable to do as she has no insurance at  this time. Will continue to manage in the training room until insurance resumes. Also of note, can consider SSRI if stress leading to anxiety or depression which in turn causes loss of appetite, but again this would be once she has insurance again.

## 2024-09-16 NOTE — PROGRESS NOTES
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 She is here today with her teammate who was present for the duration of the visit. She reports she has been feeling "terrible" for the past 2 months. She reports low energy, weakness, and feeling "heavy" during tennis activity. She believes this is secondary to her poor diet. She reports she is having a hard time maintaining an adequate diet. She reports she eats a breakfast bar for breakfast, then has a sandwich for lunch, and is +/- on rather she eats dinner. She reports she has no appetitive and difficulty eating when she is anxious and stressed. She reports when she is happy and stress free she then overeats and feels guilty afterward, frequently leading to involuntary vomiting. She reports she vomits 3-4 times a week minimum, sometimes more pending her stress. She reports her eating habits are now effecting her on the tennis court. She reports she has had 3-4 syncopal or near syncopal episodes in the past month affiliated with tennis. She reports she sometimes has heart palpations and chest pain during these events. She reports on harder tennis days she struggles a lot more and tends to have worse symptoms. Of note, she reports she has a psychologist back home (internationally) that she sees weekly, but believes she needs additional resources. She reports her stressors tend to involve school and home. Also of note, she reports she saw a nutritionist 2 weeks ago who is concerned for an eating disorder but gave her tips to help improve her diet and desire to eat.     PAST MEDICAL HISTORY:   History reviewed. No pertinent past medical history.    PAST SURGICAL HISTORY:  Past Surgical History:   Procedure Laterality Date    FUSION, JOINT, WRIST Right     right     FAMILY HISTORY:  No family history on file.    SOCIAL HISTORY:  Social History     Socioeconomic History    Marital " "status: Single   Tobacco Use    Smoking status: Never    Smokeless tobacco: Never     Social Determinants of Health     Financial Resource Strain: Medium Risk (12/11/2023)    Overall Financial Resource Strain (CARDIA)     Difficulty of Paying Living Expenses: Somewhat hard   Food Insecurity: Food Insecurity Present (12/11/2023)    Hunger Vital Sign     Worried About Running Out of Food in the Last Year: Often true     Ran Out of Food in the Last Year: Never true   Transportation Needs: No Transportation Needs (12/11/2023)    PRAPARE - Transportation     Lack of Transportation (Medical): No     Lack of Transportation (Non-Medical): No   Physical Activity: Sufficiently Active (12/11/2023)    Exercise Vital Sign     Days of Exercise per Week: 6 days     Minutes of Exercise per Session: 70 min   Stress: No Stress Concern Present (12/11/2023)    [employer] - Occupational Stress Questionnaire     Feeling of Stress : Not at all   Housing Stability: High Risk (12/11/2023)    Housing Stability Vital Sign     Unable to Pay for Housing in the Last Year: No     Number of Places Lived in the Last Year: 3     Unstable Housing in the Last Year: No     MEDICATIONS:   Current Outpatient Medications:     azithromycin (Z-NAGI) 250 MG tablet, Take 2 tablets by mouth on day 1; Take 1 tablet by mouth on days 2-5 (Patient not taking: Reported on 11/3/2023), Disp: 6 tablet, Rfl: 0    magnesium oxide (MAG-OX) 400 mg (241.3 mg magnesium) tablet, Take 1 tablet (400 mg total) by mouth every evening., Disp: 30 tablet, Rfl: 0    ALLERGIES:   Review of patient's allergies indicates:  No Known Allergies     PHYSICAL EXAMINATION:  /68   Pulse (!) 58   Ht 5' 2" (1.575 m)   Wt 63.8 kg (140 lb 10.5 oz)   BMI 25.73 kg/m²   Vitals signs and nursing note have been reviewed.  General: In no acute distress, well developed, well nourished, no diaphoresis  Eyes: EOM full and smooth, no eye redness or discharge  HENT: " normocephalic and atraumatic, neck supple, trachea midline, no nasal discharge, no lymphadenopathy   Cardiovascular: RRR, S1, S2 appreciated, no rubs, murmurs, or gallops no LE edema  Lungs: CTAB, respirations non-labored, no conversational dyspnea   Abd: nontender to palpation in all 4 quadrants, normoactive bowel sounds, no organomegaly   Neuro: alert & oriented  Skin: No rashes, warm and dry  Psychiatric: cooperative, pleasant, mood and affect appropriate for age  MSK: see below      Strength Testing: ('*' = with pain)                       Upper Extremity  Deltoid                                    5/5 B/L  Biceps                                     5/5 B/L  Triceps                                    5/5 B/L  Wrist Flexion                           5/5 B/L  Wrist Extension                       5/5 B/L  Finger abduction                     5/5 B/L                                           5/5 B/L     Lower Extremity  Hip Flexion                              5/5 B/L  Hamstrings                              5/5 B/L  Quadraceps                            5/5 B/L  Ankle Dorsiflexion                   5/5 B/L  Ankle Plantarflexion                5/5 B/L    CARDIAC TESTING:  EC. ECG obtained due to repetitive vomiting with diet concerning for electrolyte imbalance/chest pain with tennis activity.  2. ECG reviewed and interpreted personally by me and then directly with the patient.  3. FINDINGS: Normal sinus rhythm with sinus arrhythmia.  4. My interpretation are these findings coincide with athlete's heart, which is a physiologic cardiac adaptation related to training in athletes.    ASSESSMENT:      ICD-10-CM ICD-9-CM   1. Eating disorder, unspecified type  F50.9 307.50   2. 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 Today's exam is concerning for a stress induced eating disorder and she will benefit from conservative treatment at this time. Please see detailed plan below.     Due to patient's complaint of chest pain/heart palpitations and syncope with activity obtained an ECG in clinic today. ECG results were reassuring as detailed above.    2.   Due to concern for eating disorder with vomiting, will obtain labs to assess for electrolyte imbalance. Labs ordered include: CBC, CMP, TSH, Ferritin, Iron, TIBC, and CK.     3.   Patient will benefit from establishing care with sports psychologist Dr. Alton Almeida, to help manage stress and eating disorder. Referral placed.     4.   In the interim, will prescribe Magnesium Oxide 400 mg qhs to aide in sleep/recovery. This helps with restoration of brain homeostasis which secondarily helps with sleep, mood imbalances (anxiety/depression), and headache.      5.   Follow-up in 1-2 weeks in the training room or sooner if needed.    All questions were answered to the best of my ability and all concerns were addressed at this time.    I spent a total of 47 minutes on the day of the visit.  This includes face to face time and non-face to face time preparing to see the patient (eg, review of tests), obtaining and/or reviewing separately obtained history, documenting clinical information in the electronic or other health record, independently interpreting results and communicating results to the patient/family/caregiver, or care coordinator.

## 2024-09-18 ENCOUNTER — LAB VISIT (OUTPATIENT)
Dept: LAB | Facility: HOSPITAL | Age: 24
End: 2024-09-18
Attending: STUDENT IN AN ORGANIZED HEALTH CARE EDUCATION/TRAINING PROGRAM
Payer: COMMERCIAL

## 2024-09-18 ENCOUNTER — OFFICE VISIT (OUTPATIENT)
Facility: CLINIC | Age: 24
End: 2024-09-18
Payer: COMMERCIAL

## 2024-09-18 ENCOUNTER — OFFICE VISIT (OUTPATIENT)
Dept: SPORTS MEDICINE | Facility: CLINIC | Age: 24
End: 2024-09-18
Payer: COMMERCIAL

## 2024-09-18 VITALS
WEIGHT: 140.63 LBS | BODY MASS INDEX: 25.88 KG/M2 | HEART RATE: 58 BPM | DIASTOLIC BLOOD PRESSURE: 68 MMHG | HEIGHT: 62 IN | SYSTOLIC BLOOD PRESSURE: 103 MMHG

## 2024-09-18 DIAGNOSIS — F43.23 ADJUSTMENT REACTION WITH ANXIETY AND DEPRESSION: Primary | ICD-10-CM

## 2024-09-18 DIAGNOSIS — F50.9 EATING DISORDER, UNSPECIFIED TYPE: ICD-10-CM

## 2024-09-18 DIAGNOSIS — R53.83 LOW ENERGY: ICD-10-CM

## 2024-09-18 DIAGNOSIS — F50.9 EATING DISORDER, UNSPECIFIED TYPE: Primary | ICD-10-CM

## 2024-09-18 LAB
ALBUMIN SERPL BCP-MCNC: 4.1 G/DL (ref 3.5–5.2)
ALP SERPL-CCNC: 63 U/L (ref 55–135)
ALT SERPL W/O P-5'-P-CCNC: 12 U/L (ref 10–44)
ANION GAP SERPL CALC-SCNC: 10 MMOL/L (ref 8–16)
AST SERPL-CCNC: 15 U/L (ref 10–40)
BASOPHILS # BLD AUTO: 0.06 K/UL (ref 0–0.2)
BASOPHILS NFR BLD: 0.8 % (ref 0–1.9)
BILIRUB SERPL-MCNC: 0.4 MG/DL (ref 0.1–1)
BUN SERPL-MCNC: 8 MG/DL (ref 6–20)
CALCIUM SERPL-MCNC: 9.3 MG/DL (ref 8.7–10.5)
CHLORIDE SERPL-SCNC: 108 MMOL/L (ref 95–110)
CK SERPL-CCNC: 81 U/L (ref 20–180)
CO2 SERPL-SCNC: 22 MMOL/L (ref 23–29)
CREAT SERPL-MCNC: 0.7 MG/DL (ref 0.5–1.4)
DIFFERENTIAL METHOD BLD: ABNORMAL
EOSINOPHIL # BLD AUTO: 0.3 K/UL (ref 0–0.5)
EOSINOPHIL NFR BLD: 4.2 % (ref 0–8)
ERYTHROCYTE [DISTWIDTH] IN BLOOD BY AUTOMATED COUNT: 12.5 % (ref 11.5–14.5)
EST. GFR  (NO RACE VARIABLE): >60 ML/MIN/1.73 M^2
FERRITIN SERPL-MCNC: 53 NG/ML (ref 20–300)
GLUCOSE SERPL-MCNC: 90 MG/DL (ref 70–110)
HCT VFR BLD AUTO: 40.8 % (ref 37–48.5)
HGB BLD-MCNC: 13.1 G/DL (ref 12–16)
IMM GRANULOCYTES # BLD AUTO: 0.03 K/UL (ref 0–0.04)
IMM GRANULOCYTES NFR BLD AUTO: 0.4 % (ref 0–0.5)
IRON SERPL-MCNC: 46 UG/DL (ref 30–160)
LYMPHOCYTES # BLD AUTO: 1 K/UL (ref 1–4.8)
LYMPHOCYTES NFR BLD: 13.3 % (ref 18–48)
MCH RBC QN AUTO: 29.4 PG (ref 27–31)
MCHC RBC AUTO-ENTMCNC: 32.1 G/DL (ref 32–36)
MCV RBC AUTO: 92 FL (ref 82–98)
MONOCYTES # BLD AUTO: 0.6 K/UL (ref 0.3–1)
MONOCYTES NFR BLD: 7.8 % (ref 4–15)
NEUTROPHILS # BLD AUTO: 5.7 K/UL (ref 1.8–7.7)
NEUTROPHILS NFR BLD: 73.5 % (ref 38–73)
NRBC BLD-RTO: 0 /100 WBC
OHS QRS DURATION: 82 MS
OHS QTC CALCULATION: 446 MS
PLATELET # BLD AUTO: 290 K/UL (ref 150–450)
PMV BLD AUTO: 10.4 FL (ref 9.2–12.9)
POTASSIUM SERPL-SCNC: 4 MMOL/L (ref 3.5–5.1)
PROT SERPL-MCNC: 7.2 G/DL (ref 6–8.4)
RBC # BLD AUTO: 4.46 M/UL (ref 4–5.4)
SATURATED IRON: 11 % (ref 20–50)
SODIUM SERPL-SCNC: 140 MMOL/L (ref 136–145)
TOTAL IRON BINDING CAPACITY: 429 UG/DL (ref 250–450)
TRANSFERRIN SERPL-MCNC: 290 MG/DL (ref 200–375)
TSH SERPL DL<=0.005 MIU/L-ACNC: 0.5 UIU/ML (ref 0.4–4)
WBC # BLD AUTO: 7.8 K/UL (ref 3.9–12.7)

## 2024-09-18 PROCEDURE — 3078F DIAST BP <80 MM HG: CPT | Mod: CPTII,S$GLB,, | Performed by: STUDENT IN AN ORGANIZED HEALTH CARE EDUCATION/TRAINING PROGRAM

## 2024-09-18 PROCEDURE — 83540 ASSAY OF IRON: CPT | Performed by: STUDENT IN AN ORGANIZED HEALTH CARE EDUCATION/TRAINING PROGRAM

## 2024-09-18 PROCEDURE — 1160F RVW MEDS BY RX/DR IN RCRD: CPT | Mod: CPTII,S$GLB,, | Performed by: STUDENT IN AN ORGANIZED HEALTH CARE EDUCATION/TRAINING PROGRAM

## 2024-09-18 PROCEDURE — 93010 ELECTROCARDIOGRAM REPORT: CPT | Mod: S$GLB,,, | Performed by: INTERNAL MEDICINE

## 2024-09-18 PROCEDURE — 93000 ELECTROCARDIOGRAM COMPLETE: CPT | Mod: S$GLB,,, | Performed by: STUDENT IN AN ORGANIZED HEALTH CARE EDUCATION/TRAINING PROGRAM

## 2024-09-18 PROCEDURE — 80053 COMPREHEN METABOLIC PANEL: CPT | Performed by: STUDENT IN AN ORGANIZED HEALTH CARE EDUCATION/TRAINING PROGRAM

## 2024-09-18 PROCEDURE — 82550 ASSAY OF CK (CPK): CPT | Performed by: STUDENT IN AN ORGANIZED HEALTH CARE EDUCATION/TRAINING PROGRAM

## 2024-09-18 PROCEDURE — 99999 PR PBB SHADOW E&M-EST. PATIENT-LVL III: CPT | Mod: PBBFAC,,, | Performed by: STUDENT IN AN ORGANIZED HEALTH CARE EDUCATION/TRAINING PROGRAM

## 2024-09-18 PROCEDURE — 36415 COLL VENOUS BLD VENIPUNCTURE: CPT | Performed by: STUDENT IN AN ORGANIZED HEALTH CARE EDUCATION/TRAINING PROGRAM

## 2024-09-18 PROCEDURE — 93005 ELECTROCARDIOGRAM TRACING: CPT | Mod: S$GLB,,, | Performed by: STUDENT IN AN ORGANIZED HEALTH CARE EDUCATION/TRAINING PROGRAM

## 2024-09-18 PROCEDURE — 3074F SYST BP LT 130 MM HG: CPT | Mod: CPTII,S$GLB,, | Performed by: STUDENT IN AN ORGANIZED HEALTH CARE EDUCATION/TRAINING PROGRAM

## 2024-09-18 PROCEDURE — 85025 COMPLETE CBC W/AUTO DIFF WBC: CPT | Performed by: STUDENT IN AN ORGANIZED HEALTH CARE EDUCATION/TRAINING PROGRAM

## 2024-09-18 PROCEDURE — 84443 ASSAY THYROID STIM HORMONE: CPT | Performed by: STUDENT IN AN ORGANIZED HEALTH CARE EDUCATION/TRAINING PROGRAM

## 2024-09-18 PROCEDURE — 82728 ASSAY OF FERRITIN: CPT | Performed by: STUDENT IN AN ORGANIZED HEALTH CARE EDUCATION/TRAINING PROGRAM

## 2024-09-18 PROCEDURE — 99215 OFFICE O/P EST HI 40 MIN: CPT | Mod: 25,S$GLB,, | Performed by: STUDENT IN AN ORGANIZED HEALTH CARE EDUCATION/TRAINING PROGRAM

## 2024-09-18 PROCEDURE — 90791 PSYCH DIAGNOSTIC EVALUATION: CPT | Mod: 95,,, | Performed by: SOCIAL WORKER

## 2024-09-18 PROCEDURE — 3008F BODY MASS INDEX DOCD: CPT | Mod: CPTII,S$GLB,, | Performed by: STUDENT IN AN ORGANIZED HEALTH CARE EDUCATION/TRAINING PROGRAM

## 2024-09-18 PROCEDURE — 1159F MED LIST DOCD IN RCRD: CPT | Mod: CPTII,S$GLB,, | Performed by: STUDENT IN AN ORGANIZED HEALTH CARE EDUCATION/TRAINING PROGRAM

## 2024-09-18 RX ORDER — LANOLIN ALCOHOL/MO/W.PET/CERES
400 CREAM (GRAM) TOPICAL NIGHTLY
Qty: 30 TABLET | Refills: 0 | Status: SHIPPED | OUTPATIENT
Start: 2024-09-18 | End: 2024-10-18

## 2024-09-20 ENCOUNTER — TELEPHONE (OUTPATIENT)
Dept: SPORTS MEDICINE | Facility: CLINIC | Age: 24
End: 2024-09-20
Payer: COMMERCIAL

## 2024-09-20 NOTE — TELEPHONE ENCOUNTER
Writer called patient to reassure her regarding her recent lab results. She reports she had a good initial visit with sports psychologist Dr. Almeida. She has scheduled follow-up in 1 week. She reports she started the Magnesium Oxide 400 mg qhs prescription as well, which is helping her to feel more relaxed as she prepares to go to sleep. Discussed the importance of continuing to prioritize eating throughout the day to keep up with demands of her body. She reports an increase in appetite. Advised patient we will continue to monitor symptoms and clinical progress in the training room. All questions answered.

## 2024-09-23 ENCOUNTER — OFFICE VISIT (OUTPATIENT)
Facility: CLINIC | Age: 24
End: 2024-09-23
Payer: COMMERCIAL

## 2024-09-23 DIAGNOSIS — F43.23 ADJUSTMENT REACTION WITH ANXIETY AND DEPRESSION: Primary | ICD-10-CM

## 2024-09-23 NOTE — PROGRESS NOTES
The patient location is: [facility]  The chief complaint leading to consultation is: Depression and anxiety     Visit type: audiovisual    Face to Face time with patient: 60 minutes   75 minutes of total time spent on the encounter, which includes face to face time and non-face to face time preparing to see the patient (eg, review of tests), Obtaining and/or reviewing separately obtained history, Documenting clinical information in the electronic or other health record, Independently interpreting results (not separately reported) and communicating results to the patient/family/caregiver, or Care coordination (not separately reported).     Each patient to whom he or she provides medical services by telemedicine is:  (1) informed of the relationship between the physician and patient and the respective role of any other health care provider with respect to management of the patient; and (2) notified that he or she may decline to receive medical services by telemedicine and may withdraw from such care at any time.    Notes:     Individual Psychotherapy (PhD/LCSW)    9/23/2024    Site:  Telemed         Therapeutic Intervention: Met with patient.  Outpatient - Insight oriented psychotherapy 60 min - CPT code 09850 and Outpatient - Behavior modifying psychotherapy 60 min - CPT code 34431    Chief complaint/reason for encounter: depression and anxiety     Interval history and content of current session:   Ms. Ruiz arrived on time for her appointment, she appeared dysthymic, and her reported mood was congruent. Patient reported that she is currently taking a break from her sport and is struggling emotionally due to breaking no contact with her family and receiving more difficult news. The therapist explored the impact of family reconnection on the patient's emotional well-being. The therapist guided the patient in identifying distress tolerance skills to help manage her emotional struggles. Therapist also provided  patient with community resources to address food needs. Patient remained receptive and engaged throughout the session.    Practice Assignment  -engage in self-soothing techniques    Treatment plan:  Target symptoms: depression, anxiety   Why chosen therapy is appropriate versus another modality: relevant to diagnosis  Outcome monitoring methods: self-report  Therapeutic intervention type: insight oriented psychotherapy, behavior modifying psychotherapy    Risk parameters:  Patient reports no suicidal ideation  Patient reports no homicidal ideation  Patient reports no self-injurious behavior  Patient reports no violent behavior    Verbal deficits: None    Patient's response to intervention:  The patient's response to intervention is motivated.    Progress toward goals and other mental status changes:  The patient's progress toward goals is fair .    Diagnosis:     ICD-10-CM ICD-9-CM   1. Adjustment reaction with anxiety and depression  F43.23 309.28        Plan:  individual psychotherapy    Return to clinic: 1 week    Length of Service (minutes): 60

## 2024-09-24 ENCOUNTER — PATIENT MESSAGE (OUTPATIENT)
Facility: CLINIC | Age: 24
End: 2024-09-24
Payer: COMMERCIAL

## 2024-09-25 DIAGNOSIS — R42 DIZZINESS: ICD-10-CM

## 2024-09-25 DIAGNOSIS — R55 SYNCOPE, UNSPECIFIED SYNCOPE TYPE: Primary | ICD-10-CM

## 2024-09-25 NOTE — PROGRESS NOTES
Athletic Training Room Note 09/25/2024  [facility]    : Nakia Levi and John Rosen    Physician: Cresencio Luna DO      CC: low energy and dizziness    HPI:     Ramya presents to training room for follow-up of her low energy and dizziness. She reports her eating has improved and she is eating 2-3 meals a day. She reports she still feels fatigued and has headaches. She reports she is having multiple syncopal episodes a week. She reports her last episode was on 9/20/24. She reports this occurred with tennis activity and she loss consciousness for 10 minutes. She reports she has syncopal episodes with tennis activity, prolonged walking, and when rising to stand up. She reports she is tired after the events and does not always remember them. She reports her vomiting episodes are improving and she reports improvement in symptoms with sports psychologist Dr. Almeida. She reports she took a mental health break from sport and will rest for the next 2 weeks.     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 She reports she her appetite has been poor since returning to campus making it difficult for her to take in the necessary calories she needs to keep up with tennis activity.     She reports she only eats one and sometimes two times a day. She reports she has no appetite, feels full, and is nauseous after eating. She reports she has not vomited with eating since returning to campus. She does however admit this is an on and off issue that originated prior to the end of the school year last year. She reports for 3 weeks she could not eat well and even forced herself to vomit by sticking her finger in the back of her throat.     She reports her loss of appetite and nausea seem to be affiliated with stressful events in her life, such as family stressors. She  reports its improving and her appetite is now improving. She reports she has been able to play tennis and just modifies when she feels weak due to lack of adequate fuel.     Physical Exam:  General: appears well nourished and well developed  Lungs: no increase in respiratory distress with talking  Heart: no LE edema  Neuro: alert & oriented    Assessment:  1. Syncope, unspecified syncope type    2. Dizziness      Plan:   Patient's presentation is concerning for syncope secondary to cardiac or neurologic related issues. Recall, she had a previous ECG that was normal at her last visit to clinic. However, she will require a cardiology and neurology referral to ensure there is not a cardiac origin and seizure like activity affiliated with her syncopal episodes. In the interim, I agree with the decision to stop athletics to allow for improvement in mental health at this time. Will continue to monitor symptoms.

## 2024-09-30 ENCOUNTER — ATHLETIC TRAINING SESSION (OUTPATIENT)
Dept: SPORTS MEDICINE | Facility: CLINIC | Age: 24
End: 2024-09-30
Payer: COMMERCIAL

## 2024-09-30 DIAGNOSIS — R42 DIZZINESS: Primary | ICD-10-CM

## 2024-09-30 DIAGNOSIS — R63.0 LOSS OF APPETITE: ICD-10-CM

## 2024-09-30 NOTE — PROGRESS NOTES
Athletic Training Room Progress Note    Date Seen -- 9/26/2024    Ramya reported to training room c/o the following:  Woke up feeling nauseous  Vomit  Headache  Dizziness if walking for more than 10min at a time      Plan --  F/u with Dr. Luna as scheduled  Continue to follow the care plan as outline by Dr. Luna  Inform me if symptoms worsen, and go to ED if they do

## 2024-10-07 ENCOUNTER — PATIENT MESSAGE (OUTPATIENT)
Facility: CLINIC | Age: 24
End: 2024-10-07
Payer: COMMERCIAL

## 2024-10-07 ENCOUNTER — OFFICE VISIT (OUTPATIENT)
Facility: CLINIC | Age: 24
End: 2024-10-07
Payer: COMMERCIAL
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
## 2024-10-07 DIAGNOSIS — F43.23 ADJUSTMENT REACTION WITH ANXIETY AND DEPRESSION: Primary | ICD-10-CM

## 2024-10-07 PROCEDURE — 90837 PSYTX W PT 60 MINUTES: CPT | Mod: 95,,, | Performed by: SOCIAL WORKER

## 2024-10-07 NOTE — PROGRESS NOTES
The patient location is: [facility]  The chief complaint leading to consultation is: Depression and anxiety     Visit type: audiovisual    Face to Face time with patient: 60 minutes   75 minutes of total time spent on the encounter, which includes face to face time and non-face to face time preparing to see the patient (eg, review of tests), Obtaining and/or reviewing separately obtained history, Documenting clinical information in the electronic or other health record, Independently interpreting results (not separately reported) and communicating results to the patient/family/caregiver, or Care coordination (not separately reported).     Each patient to whom he or she provides medical services by telemedicine is:  (1) informed of the relationship between the physician and patient and the respective role of any other health care provider with respect to management of the patient; and (2) notified that he or she may decline to receive medical services by telemedicine and may withdraw from such care at any time.    Notes:     Individual Psychotherapy (PhD/LCSW)    10/7/2024    Site:  Telemed         Therapeutic Intervention: Met with patient.  Outpatient - Insight oriented psychotherapy 60 min - CPT code 89296 and Outpatient - Behavior modifying psychotherapy 60 min - CPT code 60441    Chief complaint/reason for encounter: depression and anxiety     Interval history and content of current session:   Ms. Ruiz arrived on time for her appointment, she appeared dysthymic, and her reported mood was congruent. She shared a positive experience celebrating her birthday, during which she felt very loved by those around her. Additionally, she has joined an environmental club, participated in [employer] night, and accepted a paid fellowship, which indicate increased engagement in her interests and social involvement. Patient expressed some challenges, noting her  was invalidating as she attended match to support her  teammates. Therapist highlighted the patients progress. Therapist assisted patient in exploring invalidating experience, and identified the impact on her thoughts, emotions, and behaviors. Patient remained receptive and engaged throughout the session.    GAD7: 18 Severe  PHQ9: 11 Moderate       Practice Assignment  -complete routine/schedule worksheet  -complete thought tracker     Treatment plan:  Target symptoms: depression, anxiety   Why chosen therapy is appropriate versus another modality: relevant to diagnosis  Outcome monitoring methods: self-report  Therapeutic intervention type: insight oriented psychotherapy, behavior modifying psychotherapy    Risk parameters:  Patient reports no suicidal ideation  Patient reports no homicidal ideation  Patient reports no self-injurious behavior  Patient reports no violent behavior    Verbal deficits: None    Patient's response to intervention:  The patient's response to intervention is motivated.    Progress toward goals and other mental status changes:  The patient's progress toward goals is fair .    Diagnosis:     ICD-10-CM ICD-9-CM   1. Adjustment reaction with anxiety and depression  F43.23 309.28   2. Activity, other involving other sports and athletics played as a team or group  Y93.69 E007.9           Plan:  individual psychotherapy    Return to clinic: 1 week    Length of Service (minutes): 60

## 2024-10-09 DIAGNOSIS — H81.93 VESTIBULAR DYSFUNCTION OF BOTH EARS: Primary | ICD-10-CM

## 2024-10-09 NOTE — PROGRESS NOTES
Athletic Training Room Note 10/09/2024  [facility]    : Nakia Levi and John Rosen    Physician: Cresencio Luna DO      CC: low energy and dizziness    HPI:     She reports since her last visit she notices an improvement in mentation since her visits with sports psychologist Dr. Almeida. She reports her eating is improving and she is able to return to activity. However, she reports she continues to have nausea, dizziness, headaches when she stands ups, doing school work, walking up and down stairs, and playing tennis. Of note, she reports she had a concussion this past summer, briefly rested for a few days but returned to activity. She reports these symptoms feel similar.     Recall from training room visit on 9/25/24.   Ramya presents to training room for follow-up of her low energy and dizziness. She reports her eating has improved and she is eating 2-3 meals a day. She reports she still feels fatigued and has headaches. She reports she is having multiple syncopal episodes a week. She reports her last episode was on 9/20/24. She reports this occurred with tennis activity and she loss consciousness for 10 minutes. She reports she has syncopal episodes with tennis activity, prolonged walking, and when rising to stand up. She reports she is tired after the events and does not always remember them. She reports her vomiting episodes are improving and she reports improvement in symptoms with sports psychologist Dr. Almeida. She reports she took a mental health break from sport and will rest for the next 2 weeks.     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 She reports she her appetite has been poor since returning to campus making it difficult for her to take in the necessary calories she needs to keep up with tennis activity.     She reports she  only eats one and sometimes two times a day. She reports she has no appetite, feels full, and is nauseous after eating. She reports she has not vomited with eating since returning to campus. She does however admit this is an on and off issue that originated prior to the end of the school year last year. She reports for 3 weeks she could not eat well and even forced herself to vomit by sticking her finger in the back of her throat.     She reports her loss of appetite and nausea seem to be affiliated with stressful events in her life, such as family stressors. She reports its improving and her appetite is now improving. She reports she has been able to play tennis and just modifies when she feels weak due to lack of adequate fuel.     Physical Exam:  General: appears well nourished and well developed  Lungs: no increase in respiratory distress with talking  Heart: no LE edema  Neuro: alert & oriented    Special Tests:  VOMS test - reproduces nausea/headache symptoms    Assessment:  1. Vestibular dysfunction of both ears        Plan:   Patient's presentation most closely correlates with vestibular dysfunction and she will benefit from vestibular rehabilitation at this time. This will to desensitize symptoms.

## 2024-10-14 ENCOUNTER — PATIENT MESSAGE (OUTPATIENT)
Facility: CLINIC | Age: 24
End: 2024-10-14

## 2024-10-14 ENCOUNTER — OFFICE VISIT (OUTPATIENT)
Facility: CLINIC | Age: 24
End: 2024-10-14

## 2024-10-14 DIAGNOSIS — F43.23 ADJUSTMENT REACTION WITH ANXIETY AND DEPRESSION: Primary | ICD-10-CM
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
## 2024-10-14 NOTE — PROGRESS NOTES
The patient location is: [facility]  The chief complaint leading to consultation is: Depression and anxiety     Visit type: audiovisual    Face to Face time with patient: 60 minutes   75 minutes of total time spent on the encounter, which includes face to face time and non-face to face time preparing to see the patient (eg, review of tests), Obtaining and/or reviewing separately obtained history, Documenting clinical information in the electronic or other health record, Independently interpreting results (not separately reported) and communicating results to the patient/family/caregiver, or Care coordination (not separately reported).     Each patient to whom he or she provides medical services by telemedicine is:  (1) informed of the relationship between the physician and patient and the respective role of any other health care provider with respect to management of the patient; and (2) notified that he or she may decline to receive medical services by telemedicine and may withdraw from such care at any time.    Notes:     Individual Psychotherapy (PhD/LCSW)    10/14/2024    Site:  Telemed         Therapeutic Intervention: Met with patient.  Outpatient - Insight oriented psychotherapy 60 min - CPT code 55110 and Outpatient - Behavior modifying psychotherapy 60 min - CPT code 48368    Chief complaint/reason for encounter: depression and anxiety     Interval history and content of current session:   Ms. Ruiz arrived on time for her appointment, she appeared dysthymic, and her reported mood was congruent. The patient reported feeling better due to increased socialization, which has positively impacted her emotional well-being. The therapist examined the patient's schedule, and helped identify obstacles in maintaining regular eating habits. Therapist analyzed schedule patterns and discussed how these patterns affect the patient's mood and overall well-being. Therapist reviewed unhelpful thinking styles with  patient. Patient remained receptive and engaged throughout the session.    GAD7: 13 Moderate  PHQ9: 12 Moderate       Practice Assignment  - routine/schedule worksheet   - identify unhelpful thinking styles   - thought tracker    Treatment plan:  Target symptoms: depression, anxiety   Why chosen therapy is appropriate versus another modality: relevant to diagnosis  Outcome monitoring methods: self-report  Therapeutic intervention type: insight oriented psychotherapy, behavior modifying psychotherapy    Risk parameters:  Patient reports no suicidal ideation  Patient reports no homicidal ideation  Patient reports no self-injurious behavior  Patient reports no violent behavior    Verbal deficits: None    Patient's response to intervention:  The patient's response to intervention is motivated.    Progress toward goals and other mental status changes:  The patient's progress toward goals is fair .    Diagnosis:     ICD-10-CM ICD-9-CM   1. Adjustment reaction with anxiety and depression  F43.23 309.28   2. Activity, other involving other sports and athletics played as a team or group  Y93.69 E007.9        Plan:  individual psychotherapy    Return to clinic: 1 week    Length of Service (minutes): 60

## 2024-10-16 DIAGNOSIS — R55 RECURRENT SYNCOPE: Primary | ICD-10-CM

## 2024-10-16 NOTE — PROGRESS NOTES
Athletic Training Room Note 10/16/2024  [facility]    : Nakia Levi and John Rosen    Physician: Cresencio Luna DO      CC: low energy and dizziness    HPI:     She presents to clinic with another syncopal episode over the weekend, while playing tennis. She reports she difficulty recovering following this event.    Recall form visit on 10/9/24:  She reports since her last visit she notices an improvement in mentation since her visits with sports psychologist Dr. Almeida. She reports her eating is improving and she is able to return to activity. However, she reports she continues to have nausea, dizziness, headaches when she stands ups, doing school work, walking up and down stairs, and playing tennis. Of note, she reports she had a concussion this past summer, briefly rested for a few days but returned to activity. She reports these symptoms feel similar.     Recall from training room visit on 9/25/24.   Ramya presents to training room for follow-up of her low energy and dizziness. She reports her eating has improved and she is eating 2-3 meals a day. She reports she still feels fatigued and has headaches. She reports she is having multiple syncopal episodes a week. She reports her last episode was on 9/20/24. She reports this occurred with tennis activity and she loss consciousness for 10 minutes. She reports she has syncopal episodes with tennis activity, prolonged walking, and when rising to stand up. She reports she is tired after the events and does not always remember them. She reports her vomiting episodes are improving and she reports improvement in symptoms with sports psychologist Dr. Almeida. She reports she took a mental health break from sport and will rest for the next 2 weeks.     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 who presents for evaluation of lightheadedness with tennis activity. She  reports she her appetite has been poor since returning to campus making it difficult for her to take in the necessary calories she needs to keep up with tennis activity.     She reports she only eats one and sometimes two times a day. She reports she has no appetite, feels full, and is nauseous after eating. She reports she has not vomited with eating since returning to campus. She does however admit this is an on and off issue that originated prior to the end of the school year last year. She reports for 3 weeks she could not eat well and even forced herself to vomit by sticking her finger in the back of her throat.     She reports her loss of appetite and nausea seem to be affiliated with stressful events in her life, such as family stressors. She reports its improving and her appetite is now improving. She reports she has been able to play tennis and just modifies when she feels weak due to lack of adequate fuel.     Physical Exam:  General: appears well nourished and well developed  Lungs: no increase in respiratory distress with talking  Heart: no LE edema  Neuro: alert & oriented    Assessment:  1. Recurrent syncope      Plan:   Patient continues to report recurrent syncope, concerning for cardiovascular cause of syncopal episodes with activity. Due to difficulty getting her scheduled with cardiology to this point, will order echocardiogram and stress echo to assess for source of recurrent syncopal episodes.

## 2024-10-22 ENCOUNTER — CLINICAL SUPPORT (OUTPATIENT)
Dept: REHABILITATION | Facility: HOSPITAL | Age: 24
End: 2024-10-22
Attending: STUDENT IN AN ORGANIZED HEALTH CARE EDUCATION/TRAINING PROGRAM
Payer: COMMERCIAL

## 2024-10-22 DIAGNOSIS — H81.93 VESTIBULAR DYSFUNCTION OF BOTH EARS: ICD-10-CM

## 2024-10-22 DIAGNOSIS — R42 DIZZINESS: Primary | ICD-10-CM

## 2024-10-22 DIAGNOSIS — R68.89 DECREASED ACTIVITY TOLERANCE: ICD-10-CM

## 2024-10-22 PROCEDURE — 97162 PT EVAL MOD COMPLEX 30 MIN: CPT | Mod: PO

## 2024-10-23 PROBLEM — R42 DIZZINESS: Status: ACTIVE | Noted: 2024-10-23

## 2024-10-23 PROBLEM — R68.89 DECREASED ACTIVITY TOLERANCE: Status: ACTIVE | Noted: 2024-10-23

## 2024-10-23 RX ORDER — HYDROXYZINE HYDROCHLORIDE 25 MG/1
50 TABLET, FILM COATED ORAL 4 TIMES DAILY PRN
Qty: 30 TABLET | Refills: 1 | Status: SHIPPED | OUTPATIENT
Start: 2024-10-23

## 2024-10-23 RX ORDER — HYDROXYZINE HYDROCHLORIDE 25 MG/1
50 TABLET, FILM COATED ORAL 4 TIMES DAILY PRN
Qty: 30 TABLET | Refills: 1 | Status: SHIPPED | OUTPATIENT
Start: 2024-10-23 | End: 2024-10-23

## 2024-10-23 NOTE — PLAN OF CARE
[facility]  Physical Therapy Neurological Rehabilitation Initial Evaluation    Name: Annmarie Bui Ruiz  Clinic Number: [id number]    Therapy Diagnosis:   Encounter Diagnoses   Name Primary?    Vestibular dysfunction of both ears     Dizziness Yes    Decreased activity tolerance      Physician: Cresencio Luna DO    Physician Orders: PT Eval and Treat   Medical Diagnosis from Referral: H81.93 (ICD-10-CM) - Vestibular dysfunction of both ears  Evaluation Date: 10/22/2024  Insurance Authorization Period: 10/09/2025  Plan of Care Expiration: 11/09/2024  Visit # / Visits authorized: 1/ 1    Time In: 10:40  Time Out: 11:20  Total Billable Time: 40 minutes    Precautions: Standard, Fall, and syncopal episodes    Subjective   Date of onset: Acute -  6 weeks ago   History of current condition - Ramya reports: She was playing in a tennis match approximately 6 weeks ago when she started felling dizzy and her vision blurred and then she passed out. In the days following this fainting episode she reports frequent headaches with sensitivity to noise and light, spontaneous dizziness and associated nausea. She has experienced multiple fainting episodes since this, only while she is playing tennis though. She reports the last time she fainted was roughly 10 days ago, and has stopped playing tennis the last 10 days. 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 She does not have a history of migraines or any heart conditions that she knows of.     Triggers: spontaneous    Description of symptoms: spinning, lightheaded, imbalance    Duration of symptoms: varies, sometimes seconds, sometimes days     Visual changes: black spots and blurred vision, gets worse when she is about to faint and feels like she is unable to keep eyes open and focused    Hearing Changes (hearing loss or tinnitus): after last time she fainted, she felt like she was under water for a  few days following    Recent history of upper respiratory infection or GI infection: None known   Currently taking Meclizine: pill for nausea, does not know name at of it at time of evaluation      Pts goals: return to tennis      Systems screening  Cardiovascular indicators: None    Is patient currently taking medication for stated indicators: No  Neurological: recent history of frequent fainting       Per 10/16/2024 note: She presents to clinic with another syncopal episode over the weekend, while playing tennis. She reports she difficulty recovering following this event.     Per referral from 10/9/2024  She reports since her last visit she notices an improvement in mentation since her visits with sports psychologist Dr. Almeida. She reports her eating is improving and she is able to return to activity. However, she reports she continues to have nausea, dizziness, headaches when she stands ups, doing school work, walking up and down stairs, and playing tennis. Of note, she reports she had a concussion this past summer, briefly rested for a few days but returned to activity. She reports these symptoms feel similar.      Recall from training room visit on 9/25/24.   Ramya presents to training room for follow-up of her low energy and dizziness. She reports her eating has improved and she is eating 2-3 meals a day. She reports she still feels fatigued and has headaches. She reports she is having multiple syncopal episodes a week. She reports her last episode was on 9/20/24. She reports this occurred with tennis activity and she loss consciousness for 10 minutes. She reports she has syncopal episodes with tennis activity, prolonged walking, and when rising to stand up. She reports she is tired after the events and does not always remember them. She reports her vomiting episodes are improving and she reports improvement in symptoms with sports psychologist Dr. Almeida. She reports she took a mental health break  from sport and will rest for the next 2 weeks.      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 She reports she her appetite has been poor since returning to campus making it difficult for her to take in the necessary calories she needs to keep up with tennis activity.      She reports she only eats one and sometimes two times a day. She reports she has no appetite, feels full, and is nauseous after eating. She reports she has not vomited with eating since returning to campus. She does however admit this is an on and off issue that originated prior to the end of the school year last year. She reports for 3 weeks she could not eat well and even forced herself to vomit by sticking her finger in the back of her throat.      She reports her loss of appetite and nausea seem to be affiliated with stressful events in her life, such as family stressors. She reports its improving and her appetite is now improving. She reports she has been able to play tennis and just modifies when she feels weak due to lack of adequate fuel.       Medical History:   No past medical history on file.    Surgical History:   Annmarie Ruiz  has a past surgical history that includes fusion, joint, wrist (Right).    Medications:   Annmarie currently has no medications in their medication list.    Allergies:   Review of patient's allergies indicates:  No Known Allergies     Imaging: None per chart review    Vestibular Function Testing/Audiogram: None per chart review    Prior Therapy: none  Social History: lives with roommates in an off campus apartment   Falls: no recent falls to the ground but frequent near falls, she can usually feel when she is about to faint and will try and lower herself to the floor  DME: none    Home Environment: Off-campus apartment with elevator access  Exercise Routine / History:  Currently resting but is a student athlete, conditioning for tennis  Family Present at time of Eval: friend    Occupation: student athlete   Prior Level of Function: Independent with all functional mobility and ADLs  Current Level of Function: Independent with all functional mobility and ADLs however, unable to play tennis     Pain:   Current 3/10, worst 9/10, best 3/10   Location: headache. Sometimes entire head, sometimes frontal or occipital regions   Description: varies, aching, pressure, throbbing and sharp sensations  Aggravating Factors: looking at screens, small spaces, noisy environments   Easing Factors: ibuprofen and rest     Objective   Vitals:  BP following ~5 minutes of standin/82 mmhg  HR: 69 bpm    Outcome Measure:   Dizziness Handicap Inventory: 82%    SYSTEMS SCREEN    - Follows commands: 100% of time   - Speech: no deficits     Mental status: alert, oriented to person, place, and time, normal mood, behavior, speech, dress, motor activity, and thought processes  Appearance: Casually dressed and Well groomed  Behavior:  calm and cooperative  Attention Span and Concentration:  Normal    Posture Alignment: WNL    Sensation: Light Touch: Intact, not formally assessed patient denies changes          RANGE OF MOTION--LOWER EXTREMITIES  (R) LE Hip: equal bilaterally   Knee: equal bilaterally   Ankle: equal bilaterally    (L) LE: Hip: equal bilaterally   Knee: equal bilaterally   Ankle: equal bilaterally    Lower Extremity Strength: WFL; not formally assessed     ROM:   CERVICAL SPINE  Flexion: WNL   Extension: WNL but dizziness noted coming back to neutral   L side bend: WNL   R side bend: WNL   L rotation: WNL   R rotation: WNL   Are concurrent symptoms present with any of these movements: As noted above     Modified VAS (Vertebral Artery Screen), in sitting (rotation, then extension):  R: Negative  L: Negative    VESTIBULAR EXAMINATION    Oculomotor Screen in room light (fixation present):   Known  eye dysfunction: None   Ocular ROM: WNL    Tracking/Smooth Pursuits: Impaired: saccadic intrusions   Saccades: Impaired: undershooting in all directions   Convergence: DNT    Spontaneous Nystagmus: Absent   Gaze Holding Nystagmus: Absent     Head Thrust Test: Positive bilaterally     Dynamic Visual Acuity: DNT     POSITIONAL CANAL TESTING: Deferred this visit    Modified Motion Sensitivity Test: (All conditions performed standing facing a non-busy environment)  Movement Intensity (0-10) Duration  <5s=0  5-10s=1  11-20s = 2  21-30s = 3  >30s = 4 Score   5x Horizontal head turns 5 1 6   2. 5x Vertical Head turns 6 2 8   3. 5x Right diagonal head turns (upper left down to right) 6 (black spots) 3 9   4. 5x Left diagonal head turns (upper right down to left) 5 (black spots) 2 7   5. 5x Trunk bends (bending knees reaching to floor) - - -   6. 5x Right quarter body turns (Look over right shoulder with trunk rotation, feet planted) - - -   7. 5x Left quarter body turns (Look over left shoulder with trunk rotation, feet planted) - - -   8. 1 x 360 degree turn to the right  - - -   9. 1x 360 degree turn to the left - - -   10. 5x VOR cancellation (follow thumbs horizontally with head/trunk rotation x45 degrees each way) - - -   Total score   30     mMSQ = Total score x (# of positions) / 14.00 =30 * /4 = 8.6 for portion completed    Interpretation:   Mild: 0 - 10  Moderate: 11 - 30  Severe: 31 - 100      CMS Impairment/Limitation/Restriction for FOTO Vestibular Survey    Therapist reviewed FOTO scores for Annmarie Ruiz on 10/22/2024.   FOTO documents entered into EPIC - see Media section.    Functional Score: 50%  Category: Mobility       TREATMENT   No treatment provided today. All time spent on evaluation.     Home Exercises and Patient Education Provided    Education provided: Examination findings, POC, scheduling, goals of therapy    Written Home Exercises Provided: No. To be established at initial follow up  session.     Assessment   Annmarie is a 24 y.o. female referred to outpatient Physical Therapy with a medical diagnosis of Vestibular dysfunction of both ears. Patient presents with chief complaint(s) of: dizziness, nausea, headaches, and frequent episodes of syncope that recently began in the last 6 weeks. She experiences syncopal episodes mainly during her tennis matches. Her oculomotor screen revealed impairments with smooth pursuits with saccadic intrusions noted in vertical direction and undershooting present in all directions with saccades. Indicating some central involvement present. Gaze stabilization testing revealed bilateral vestibular dysfunction with positive head thrust test bilaterally with increased dizziness and nausea reported. Secondary to increase in symptoms, dynamic visual acuity test deferred. Due to patient unable to identify a pattern with triggers for dizziness, Brunella taken through motion sensitivity quotient test to identify any symptomatic head movements, however, SPT stopped test due to patient reporting visual changes with blurriness and black spots present. Blood pressure assessed following reports of visual changes and was slightly elevated but within functional limits. SPT discussed with Ramya the findings from the evaluation and that we do not suspect this is a peripheral vestibular disorder. She is scheduled for cardiology and neurology in the future. She did request that we send her with a home exercise program to complete with her physical therapist through her athletic program at school. Due to time constraints home exercise program was not given this visit and will be established at her first follow up. Ramya educated on participating in light walks to maintain her aerobic endurance with a partner but to limit strenuous activities to avoid syncopal episodes. At this time Ramya is appropriate for outpatient physical therapy to establish a home exercise program, and  perform more testing to identify any further impairments.    Pt prognosis is Good.   Pt will benefit from skilled outpatient Physical Therapy to address the deficits stated above and in the chart below, provide pt/family education, and to maximize pt's level of independence.     Plan of care discussed with patient: Yes   Pt's spiritual, cultural and educational needs considered and patient is agreeable to the plan of care and goals as stated below:     Anticipated Barriers for therapy: no    Medical Necessity is demonstrated by the following  History  Co-morbidities and personal factors that may impact the plan of care [] LOW: no personal factors / comorbidities  [] MODERATE: 1-2 personal factors / comorbidities  [x] HIGH: 3+ personal factors / comorbidities    Moderate / High Support Documentation:   Co-morbidities:   Allergies, Anxiety/Panic Disorders, Asthma, Depression, Headaches    Personal Factors:   no deficits     Examination  Body Structures and Functions, activity limitations and participation restrictions that may impact the plan of care [] LOW: addressing 1-2 elements  [x] MODERATE: 3+ elements  [] HIGH: 4+ elements (please support below)    Moderate / High Support Documentation:   Body Regions:   head    Body Systems:    balance    Participation Restrictions:   Unable to play tennis    Activity limitations:   Learning and applying knowledge  no deficits     Clinical Presentation [] LOW: stable  [x] MODERATE: Evolving  [] HIGH: Unstable     Decision Making/ Complexity Score: moderate       Goals:  Long Term Goals (LTG), 4 weeks.   Pt agrees to goals set. Eval date: 10/23/2024     Status   LTG: Patient will be independent with HEP emphasizing activity tolerance.  Education required Ongoing   LTG: Patient will exhibit improved Dizziness Handicap Inventory to 63% indicating decreased self-perceived disability related to dizziness. 82% Ongoing   LTG: Patient will tolerate aerobic testing without adverse  effects to establish long term goal To be assessed Ongoing       Plan   Plan of care Certification: 10/22/2024 to 11/22/2024.    Plan to assess 6MWT, and orthostatic hypotension at first follow-up    Outpatient Physical Therapy 1 times weekly for 4 weeks to include the following interventions: Gait Training, Neuromuscular Re-ed, Patient Education, Self Care, Therapeutic Activities, Therapeutic Exercise, Sensory Integration, and Canalith Repositioning Maneuvers.     Kim Cruz, SPT    I certify that I was present in the room directing the student in service delivery and guiding them using my skilled judgment. As the co-signing therapist, I have reviewed the student's documentation and am responsible for the treatment, assessment and plan.    Leigh Belcher, PT

## 2024-10-25 ENCOUNTER — TELEPHONE (OUTPATIENT)
Dept: CARDIOLOGY | Facility: CLINIC | Age: 24
End: 2024-10-25
Payer: COMMERCIAL

## 2024-10-25 ENCOUNTER — TELEPHONE (OUTPATIENT)
Dept: ELECTROPHYSIOLOGY | Facility: CLINIC | Age: 24
End: 2024-10-25
Payer: COMMERCIAL

## 2024-10-25 DIAGNOSIS — R55 SYNCOPE AND COLLAPSE: Primary | ICD-10-CM

## 2024-10-25 NOTE — TELEPHONE ENCOUNTER
----- Message from Evan Fuentes MD sent at 10/25/2024  6:56 AM CDT -----  Regarding: FW: Concern for POTS - [employer]  She is having syncope so have her get echo and see EP ASAP,CJL  ----- Message -----  From: Elijah Ruiz MD  Sent: 10/24/2024   6:07 PM CDT  To: Evan Fuentes MD; Noam Perez MD; #  Subject: RE: Concern for POTS - [employer] At#    At that age, she would definitely see adult cardiology.  I included Uriah Perez and Gina from adult cards who should be able to help.  ----- Message -----  From: Cresencio Luna DO  Sent: 10/24/2024   4:46 PM CDT  To: Elijah Ruiz MD  Subject: Concern for POTS - [email or web address]    Dr. Ruiz,    I hope all is well! 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 I referred to cardiology and event attempted to get her scheduled for an echocardiogram and stress test but she has not received any call back. I wanted to reach out to see if you could assist in helping to get her scheduled for evaluation.     Cresencio Marsh

## 2024-10-25 NOTE — TELEPHONE ENCOUNTER
Spoke w. pt due to cards appt at [facility] cancelled due to insurance reasons. She has the contact info for dr Allan and I emailed her a list of cardiology practices in [place]/ [place] who cover pt w. insurance issues. She verbalized understanding.

## 2024-10-25 NOTE — TELEPHONE ENCOUNTER
Called patient regarding her appointment with Dr. Lopez on Monday 10/28. Patient being referred for POTS workup. Dr. Lopez reviewed. There is no POTS specialist at [facility]. Patient should be referred to Dr. Alexander Allan at [facility]. Patient given this information and the phone number to make an appointment. I also let Whitley in general cardiology know because she scheduled the patient. She was also provided the phone number and name for Dr. Allan. Appointment with Dr. Lopez cancelled. Patient verbalized understanding.

## 2024-10-25 NOTE — TELEPHONE ENCOUNTER
Pt scheduled for echo and visit w. dr RACHNA Lopez. She agreed to date/time of appointment(s). Updated international department of upcoming appts.

## 2024-10-28 ENCOUNTER — PATIENT MESSAGE (OUTPATIENT)
Facility: CLINIC | Age: 24
End: 2024-10-28
Payer: COMMERCIAL

## 2024-10-28 DIAGNOSIS — R55 RECURRENT SYNCOPE: Primary | ICD-10-CM

## 2024-11-04 ENCOUNTER — PATIENT MESSAGE (OUTPATIENT)
Dept: PSYCHIATRY | Facility: CLINIC | Age: 24
End: 2024-11-04
Payer: COMMERCIAL

## 2024-11-13 ENCOUNTER — DOCUMENTATION ONLY (OUTPATIENT)
Dept: REHABILITATION | Facility: HOSPITAL | Age: 24
End: 2024-11-13
Payer: COMMERCIAL

## 2024-11-13 DIAGNOSIS — R42 DIZZINESS: Primary | ICD-10-CM

## 2024-11-13 NOTE — PROGRESS NOTES
OUTPATIENT PHYSICAL THERAPY DISCHARGE SUMMARY     Name: Annmarie Ruiz  Clinic Number: [id number]    Diagnosis:   Encounter Diagnosis   Name Primary?    Dizziness Yes     Physician: Cresencio Luna DO  Treatment Orders: PT Eval and Treat  No past medical history on file.    Initial visit: 10/22/2024  Date of Last visit: 10/22/2024  Date of Discharge Note:  11/13/2024  Plan of care Expiration Date: 11/09/2024  Total Visits Received: 1    ASSESSMENT   Annmarie Ruiz only presented for 1 PT session then cancelled the remaining PT visit scheduled due to a time conflict. Ramya has not attempted to schedule any further appointments. Via telephone to discuss potential continuation of PT, Ramya decided to defer PT plan of care at this time due to her insurance not being accepted. She continues to work with her medical team concerning her symptoms. Therefore, PT plan of care is being discharged at this time. Due to such limited PT attendance and unexpected pt self discharge, no progress noted and no final discharge measures were assessed.       Discharge reason : Patient self discharge    PLAN   This patient is discharged from Outpatient [facility] Therapy Services.     Leigh Belcher, PT  11/13/2024

## 2024-11-15 ENCOUNTER — TELEPHONE (OUTPATIENT)
Dept: PSYCHIATRY | Facility: CLINIC | Age: 24
End: 2024-11-15
Payer: COMMERCIAL

## 2024-11-15 NOTE — TELEPHONE ENCOUNTER
Followed up with patient. She expressed she is managing well overall. Patient reported she is still interested in meeting with psychology resident. Therapist informed patient she will be contacted in December to discuss treatment initiation.

## 2025-01-29 DIAGNOSIS — F41.9 ANXIETY: ICD-10-CM

## 2025-01-29 DIAGNOSIS — R56.9 SEIZURE: Primary | ICD-10-CM

## 2025-01-29 DIAGNOSIS — F50.9 EATING DISORDER, UNSPECIFIED TYPE: ICD-10-CM

## 2025-01-29 RX ORDER — PAROXETINE HYDROCHLORIDE 20 MG/1
20 TABLET, FILM COATED ORAL EVERY MORNING
Qty: 30 TABLET | Refills: 3 | Status: SHIPPED | OUTPATIENT
Start: 2025-01-29 | End: 2026-01-29

## 2025-01-29 NOTE — PROGRESS NOTES
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 She underwent an MRI of the brain. She reports this was confirmed on EEG. She was prescribed Clobazam 15 mg daily.    She reports she also went to psychiatry December 2024 and was diagnosed with anxiety, body dysmorphia and restrictive eating disorder. She was prescribed Paraoxetine 20 mg daily.    She reports she has been taking her medications for the past 3 weeks and she is feeling better on the medicine. She is eating better and can focus and hang with friends without being sad.     She reports she returned to tennis yesterday, without any issues.     She reports she has not had seizure like activity since last week.     She reports she is following up with her doctors back home, psychiatry once a week, psychology once a week, eating disorder specialist once a week, and neurologist every 2 weeks.    Physical Exam:  General: alert & oriented   Psychiatric: mood and affect appropriate     Assessment:  1. Seizure    2. Anxiety    3. Eating disorder, unspecified type      Plan:   Ramya is doing well, and officially has a diagnosis of absent seizure, body dysmorphia, anxiety, and restrictive eating disorder. She has improvement of her symptoms, with interventions from back home. She will continue to benefit from taking her Paroxetine 20 mg and her antiepilepsy medication, Clobazam 15 mg daily. Agree with gradual return to tennis activity as tolerated, will benefit from gradual progression back at her pace, over the next few weeks.

## 2025-02-26 ENCOUNTER — ATHLETIC TRAINING SESSION (OUTPATIENT)
Dept: SPORTS MEDICINE | Facility: CLINIC | Age: 25
End: 2025-02-26
Payer: COMMERCIAL

## 2025-02-26 DIAGNOSIS — Z00.00 HEALTHCARE MAINTENANCE: Primary | ICD-10-CM

## 2025-02-27 NOTE — PROGRESS NOTES
Reason for Encounter N/A    Subjective:     Chief Complaint: Annmarie Ruiz is a 24 y.o. 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    Handedness: right-handed  Sport played: tennis      Level: college              Assessment:     Status: F - Full Participation    Date Seen:  02/24/2025 - 02/28/2025    Date of Injury:  n/a    Date Out:  n/a    Date Cleared:  n/a    Treatment/Rehab/Maintenance:     DATE OF SERVICE: 02/28/2025  INJURY/CONDITON: Left Hamstring      MODALITIES:    MISCELLANEOUS:       []Active Release   []Compression Wrap   []Cupping    []Support Wrap  []E-Stim- Compex   []Taping  []E-Stim- IFC    [x]Foam Roller  [x]E-Stim- Premod   []Cold Tub  []Joint Mobilization   []Contrast Tub  []Manual Therapy   [x]Hot Pack  []Massage    []Hot Tub  []Massage - Scar Tissue  []Ice Cup  []Myofascial Release   []Ice Pack  []Flossing/BFR   []GameReady  []Ultrasound  []Ultrasound - Phonophoresis  []Instrumental Assisted Soft Tissue Mobilization (IASTM)  []Intermittent Compression - Normatec  []Massage Gun  []ROM - Active  []ROM - Passive  [x]Stretching - Active  []Stretching - Dynamic  []Stretching - Passive  []Stretching - PNF  []Stretching - Static  []Mobility Work - Hip/Back  []Mobility Work - Ankle  []Mobility Work - Shoulder        DATE OF SERVICE: 02/26/2025  INJURY/CONDITON: Left Hamstring / Right Calf    MODALITIES:    MISCELLANEOUS:       []Active Release   []Compression Wrap   [x]Cupping    []Support Wrap  []E-Stim- Compex   []Taping  []E-Stim- IFC    []Foam Roller  []E-Stim- Premod   []Cold Tub  []Joint Mobilization   []Contrast Tub  []Manual Therapy   []Hot Pack  []Massage    []Hot Tub  []Massage - Scar Tissue  []Ice Cup  []Myofascial Release   []Ice Pack  []Flossing/BFR   []GameReady  []Ultrasound  []Ultrasound - Phonophoresis  [x]Instrumental Assisted Soft Tissue  Mobilization (IASTM)  []Intermittent Compression - Normatec  []Massage Gun  [x]ROM - Active  []ROM - Passive  []Stretching - Active  []Stretching - Dynamic  []Stretching - Passive  []Stretching - PNF  []Stretching - Static  []Mobility Work - Hip/Back  []Mobility Work - Ankle  []Mobility Work - Shoulder      Plan:     1. Come into ATF as needed for recovery/maintenance  2. Physician Referral: no  3. ED Referral:no  4. Parent/Guardian Notified: No  5. All questions were answered, ath. will contact me for questions or concerns in  the interim.  6.         Eligible to use School Insurance: Yes

## 2025-03-01 ENCOUNTER — ATHLETIC TRAINING SESSION (OUTPATIENT)
Dept: SPORTS MEDICINE | Facility: CLINIC | Age: 25
End: 2025-03-01
Payer: COMMERCIAL

## 2025-03-01 ENCOUNTER — HOSPITAL ENCOUNTER (EMERGENCY)
Facility: HOSPITAL | Age: 25
Discharge: HOME | End: 2025-03-01
Attending: EMERGENCY MEDICINE
Payer: COMMERCIAL

## 2025-03-01 VITALS
HEART RATE: 84 BPM | WEIGHT: 140 LBS | DIASTOLIC BLOOD PRESSURE: 64 MMHG | OXYGEN SATURATION: 100 % | HEIGHT: 62 IN | RESPIRATION RATE: 20 BRPM | TEMPERATURE: 99 F | SYSTOLIC BLOOD PRESSURE: 115 MMHG | BODY MASS INDEX: 25.76 KG/M2

## 2025-03-01 DIAGNOSIS — R56.9 SEIZURE: Primary | ICD-10-CM

## 2025-03-01 DIAGNOSIS — R56.9 OBSERVED SEIZURE-LIKE ACTIVITY: Primary | ICD-10-CM

## 2025-03-01 DIAGNOSIS — R56.9 SEIZURE: ICD-10-CM

## 2025-03-01 DIAGNOSIS — R55 SYNCOPE, UNSPECIFIED SYNCOPE TYPE: ICD-10-CM

## 2025-03-01 LAB
ALBUMIN SERPL BCP-MCNC: 3.7 G/DL (ref 3.5–5.2)
ALP SERPL-CCNC: 48 U/L (ref 40–150)
ALT SERPL W/O P-5'-P-CCNC: 9 U/L (ref 10–44)
AMPHET+METHAMPHET UR QL: NEGATIVE
ANION GAP SERPL CALC-SCNC: 12 MMOL/L (ref 8–16)
AST SERPL-CCNC: 23 U/L (ref 10–40)
B-HCG UR QL: NEGATIVE
BARBITURATES UR QL SCN>200 NG/ML: NEGATIVE
BASOPHILS # BLD AUTO: 0.06 K/UL (ref 0–0.2)
BASOPHILS NFR BLD: 0.4 % (ref 0–1.9)
BENZODIAZ UR QL SCN>200 NG/ML: ABNORMAL
BILIRUB SERPL-MCNC: 0.5 MG/DL (ref 0.1–1)
BILIRUB UR QL STRIP: NEGATIVE
BUN SERPL-MCNC: 10 MG/DL (ref 6–20)
BUN SERPL-MCNC: 9 MG/DL (ref 6–30)
BZE UR QL SCN: NEGATIVE
CALCIUM SERPL-MCNC: 8.3 MG/DL (ref 8.7–10.5)
CANNABINOIDS UR QL SCN: NEGATIVE
CHLORIDE SERPL-SCNC: 106 MMOL/L (ref 95–110)
CHLORIDE SERPL-SCNC: 110 MMOL/L (ref 95–110)
CLARITY UR REFRACT.AUTO: CLEAR
CO2 SERPL-SCNC: 17 MMOL/L (ref 23–29)
COLOR UR AUTO: YELLOW
CREAT SERPL-MCNC: 0.7 MG/DL (ref 0.5–1.4)
CREAT SERPL-MCNC: 0.7 MG/DL (ref 0.5–1.4)
CREAT UR-MCNC: 76 MG/DL (ref 15–325)
CTP QC/QA: YES
DIFFERENTIAL METHOD BLD: ABNORMAL
EOSINOPHIL # BLD AUTO: 0 K/UL (ref 0–0.5)
EOSINOPHIL NFR BLD: 0.1 % (ref 0–8)
ERYTHROCYTE [DISTWIDTH] IN BLOOD BY AUTOMATED COUNT: 11.8 % (ref 11.5–14.5)
EST. GFR  (NO RACE VARIABLE): >60 ML/MIN/1.73 M^2
GLUCOSE SERPL-MCNC: 69 MG/DL (ref 70–110)
GLUCOSE SERPL-MCNC: 72 MG/DL (ref 70–110)
GLUCOSE UR QL STRIP: NEGATIVE
HCT VFR BLD AUTO: 35.1 % (ref 37–48.5)
HCT VFR BLD CALC: 35 %PCV (ref 36–54)
HGB BLD-MCNC: 12 G/DL (ref 12–16)
HGB UR QL STRIP: NEGATIVE
IMM GRANULOCYTES # BLD AUTO: 0.05 K/UL (ref 0–0.04)
IMM GRANULOCYTES NFR BLD AUTO: 0.3 % (ref 0–0.5)
INFLUENZA A, MOLECULAR: NEGATIVE
INFLUENZA B, MOLECULAR: NEGATIVE
KETONES UR QL STRIP: ABNORMAL
LEUKOCYTE ESTERASE UR QL STRIP: NEGATIVE
LYMPHOCYTES # BLD AUTO: 1.7 K/UL (ref 1–4.8)
LYMPHOCYTES NFR BLD: 11 % (ref 18–48)
MAGNESIUM SERPL-MCNC: 1.8 MG/DL (ref 1.6–2.6)
MCH RBC QN AUTO: 29.6 PG (ref 27–31)
MCHC RBC AUTO-ENTMCNC: 34.2 G/DL (ref 32–36)
MCV RBC AUTO: 87 FL (ref 82–98)
METHADONE UR QL SCN>300 NG/ML: NEGATIVE
MONOCYTES # BLD AUTO: 0.8 K/UL (ref 0.3–1)
MONOCYTES NFR BLD: 5 % (ref 4–15)
NEUTROPHILS # BLD AUTO: 13.2 K/UL (ref 1.8–7.7)
NEUTROPHILS NFR BLD: 83.2 % (ref 38–73)
NITRITE UR QL STRIP: NEGATIVE
NRBC BLD-RTO: 0 /100 WBC
OPIATES UR QL SCN: NEGATIVE
PCP UR QL SCN>25 NG/ML: NEGATIVE
PH UR STRIP: 6 [PH] (ref 5–8)
PLATELET # BLD AUTO: 276 K/UL (ref 150–450)
PMV BLD AUTO: 10.5 FL (ref 9.2–12.9)
POC IONIZED CALCIUM: 1.18 MMOL/L (ref 1.06–1.42)
POC TCO2 (MEASURED): 20 MMOL/L (ref 23–29)
POCT GLUCOSE: 118 MG/DL (ref 70–110)
POTASSIUM BLD-SCNC: 3.5 MMOL/L (ref 3.5–5.1)
POTASSIUM SERPL-SCNC: 3.6 MMOL/L (ref 3.5–5.1)
PROT SERPL-MCNC: 6.8 G/DL (ref 6–8.4)
PROT UR QL STRIP: NEGATIVE
RBC # BLD AUTO: 4.05 M/UL (ref 4–5.4)
SAMPLE: ABNORMAL
SODIUM BLD-SCNC: 141 MMOL/L (ref 136–145)
SODIUM SERPL-SCNC: 139 MMOL/L (ref 136–145)
SP GR UR STRIP: 1.01 (ref 1–1.03)
SPECIMEN SOURCE: NORMAL
TOXICOLOGY INFORMATION: ABNORMAL
TSH SERPL DL<=0.005 MIU/L-ACNC: 0.58 UIU/ML (ref 0.4–4)
URN SPEC COLLECT METH UR: ABNORMAL
WBC # BLD AUTO: 15.81 K/UL (ref 3.9–12.7)

## 2025-03-01 PROCEDURE — 84443 ASSAY THYROID STIM HORMONE: CPT | Performed by: EMERGENCY MEDICINE

## 2025-03-01 PROCEDURE — 93010 ELECTROCARDIOGRAM REPORT: CPT | Mod: ,,, | Performed by: INTERNAL MEDICINE

## 2025-03-01 PROCEDURE — 93005 ELECTROCARDIOGRAM TRACING: CPT

## 2025-03-01 PROCEDURE — 85025 COMPLETE CBC W/AUTO DIFF WBC: CPT | Performed by: EMERGENCY MEDICINE

## 2025-03-01 PROCEDURE — 99284 EMERGENCY DEPT VISIT MOD MDM: CPT | Mod: 25

## 2025-03-01 PROCEDURE — 80053 COMPREHEN METABOLIC PANEL: CPT | Performed by: EMERGENCY MEDICINE

## 2025-03-01 PROCEDURE — 87502 INFLUENZA DNA AMP PROBE: CPT | Performed by: EMERGENCY MEDICINE

## 2025-03-01 PROCEDURE — 81003 URINALYSIS AUTO W/O SCOPE: CPT | Performed by: EMERGENCY MEDICINE

## 2025-03-01 PROCEDURE — 25000003 PHARM REV CODE 250: Performed by: EMERGENCY MEDICINE

## 2025-03-01 PROCEDURE — 83735 ASSAY OF MAGNESIUM: CPT | Performed by: EMERGENCY MEDICINE

## 2025-03-01 PROCEDURE — 81025 URINE PREGNANCY TEST: CPT | Performed by: EMERGENCY MEDICINE

## 2025-03-01 PROCEDURE — 82962 GLUCOSE BLOOD TEST: CPT

## 2025-03-01 PROCEDURE — 80307 DRUG TEST PRSMV CHEM ANLYZR: CPT | Performed by: EMERGENCY MEDICINE

## 2025-03-01 PROCEDURE — 96360 HYDRATION IV INFUSION INIT: CPT

## 2025-03-01 PROCEDURE — 80047 BASIC METABLC PNL IONIZED CA: CPT

## 2025-03-01 RX ADMIN — SODIUM CHLORIDE 1000 ML: 9 INJECTION, SOLUTION INTRAVENOUS at 06:03

## 2025-03-01 NOTE — ED NOTES
I-STAT Chem-8+ Results:   Value Reference Range   Sodium 141 136-145 mmol/L   Potassium  3.5 3.5-5.1 mmol/L   Chloride 106  mmol/L   Ionized Calcium 1.18 1.06-1.42 mmol/L   CO2 (measured) 20 23-29 mmol/L   Glucose 73  mg/dL   BUN 9 6-30 mg/dL   Creatinine 0.7 0.5-1.4 mg/dL   Hematocrit 35 36-54%

## 2025-03-01 NOTE — ED PROVIDER NOTES
Encounter Date: 3/1/2025       History     Chief Complaint   Patient presents with    Seizures     Pt arrives via EMS from school after exercising outside for several hours after report of witnessed seizure for 10 min, no head injury     Pt is a 25 yo F with no significant PMH who presents after a syncopal versus seizure episode that occurred just prior to arrival. Patient was playing tennis for several hours out in the heat and felt overheated. She sat down and passed out. They moved her to the locker room and noted some upper extremity jerking which was intermittent. Patient notes that in Dec 2024 she was diagnosed with epilepsy in [facility] which she says were staring seizures. She takes Clobazam for this. 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 She has not yet followed up. She notes she has not recently been ill. She has not missed any doses of her medication. She did take Diclofenac this morning.  She did not have tongue biting or bowel or bladder incontinence.    The history is provided by the patient and a friend.     Review of patient's allergies indicates:   Allergen Reactions    Peanut Rash     History reviewed. No pertinent past medical history.  Past Surgical History:   Procedure Laterality Date    FUSION, JOINT, WRIST Right     right     No family history on file.  Social History[1]      Physical Exam     Initial Vitals [03/01/25 1530]   BP Pulse Resp Temp SpO2   122/69 110 (!) 28 98.8 °F (37.1 °C) 100 %      MAP       --         Physical Exam    Nursing note and vitals reviewed.  Constitutional: She appears well-developed and well-nourished. She is not diaphoretic. No distress.   HENT:   Head: Normocephalic and atraumatic.   Right Ear: External ear normal.   Left Ear: External ear normal.   Eyes: Conjunctivae and EOM are normal. Pupils are equal, round, and  reactive to light.   Neck: Neck supple. No JVD present.   Normal range of motion.  Cardiovascular:  Normal rate, regular rhythm and intact distal pulses.           Pulmonary/Chest: No respiratory distress.   Abdominal: She exhibits no distension.   Musculoskeletal:         General: No edema. Normal range of motion.      Cervical back: Normal range of motion and neck supple.     Neurological: She is alert and oriented to person, place, and time. GCS score is 15. GCS eye subscore is 4. GCS verbal subscore is 5. GCS motor subscore is 6.   Resting on the stretcher with her eyes closed  Full movement of all extremities,   No jerking   Skin: Skin is warm and dry.   Psychiatric: She has a normal mood and affect. Her behavior is normal. Judgment and thought content normal.         ED Course   Procedures  Labs Reviewed   CBC W/ AUTO DIFFERENTIAL - Abnormal       Result Value    WBC 15.81 (*)     RBC 4.05      Hemoglobin 12.0      Hematocrit 35.1 (*)     MCV 87      MCH 29.6      MCHC 34.2      RDW 11.8      Platelets 276      MPV 10.5      Immature Granulocytes 0.3      Gran # (ANC) 13.2 (*)     Immature Grans (Abs) 0.05 (*)     Lymph # 1.7      Mono # 0.8      Eos # 0.0      Baso # 0.06      nRBC 0      Gran % 83.2 (*)     Lymph % 11.0 (*)     Mono % 5.0      Eosinophil % 0.1      Basophil % 0.4      Differential Method Automated     COMPREHENSIVE METABOLIC PANEL - Abnormal    Sodium 139      Potassium 3.6      Chloride 110      CO2 17 (*)     Glucose 69 (*)     BUN 10      Creatinine 0.7      Calcium 8.3 (*)     Total Protein 6.8      Albumin 3.7      Total Bilirubin 0.5      Alkaline Phosphatase 48      AST 23      ALT 9 (*)     eGFR >60.0      Anion Gap 12     DRUG SCREEN PANEL, URINE EMERGENCY - Abnormal    Benzodiazepines Presumptive Positive (*)     Methadone metabolites Negative      Cocaine (Metab.) Negative      Opiate Scrn, Ur Negative      Barbiturate Screen, Ur Negative      Amphetamine Screen, Ur Negative       THC Negative      Phencyclidine Negative      Creatinine, Urine 76.0      Toxicology Information SEE COMMENT      Narrative:     Specimen Source->Urine   URINALYSIS, REFLEX TO URINE CULTURE - Abnormal    Specimen UA Urine, Clean Catch      Color, UA Yellow      Appearance, UA Clear      pH, UA 6.0      Specific Gravity, UA 1.015      Protein, UA Negative      Glucose, UA Negative      Ketones, UA 3+ (*)     Bilirubin (UA) Negative      Occult Blood UA Negative      Nitrite, UA Negative      Leukocytes, UA Negative      Narrative:     Specimen Source->Urine   ISTAT PROCEDURE - Abnormal    POC Glucose 72      POC BUN 9      POC Creatinine 0.7      POC Sodium 141      POC Potassium 3.5      POC Chloride 106      POC TCO2 (MEASURED) 20 (*)     POC Ionized Calcium 1.18      POC Hematocrit 35 (*)     Sample BARBARA     POCT GLUCOSE - Abnormal    POCT Glucose 118 (*)    INFLUENZA A & B BY MOLECULAR    Influenza A, Molecular Negative      Influenza B, Molecular Negative      Flu A & B Source Nasal swab     MAGNESIUM    Magnesium 1.8     TSH    TSH 0.577     POCT URINE PREGNANCY    POC Preg Test, Ur Negative       Acceptable Yes          ECG Results              EKG 12-lead (Final result)        Collection Time Result Time QRS Duration OHS QTC Calculation    03/01/25 17:43:21 03/02/25 09:14:45 86 457                     Final result by Interface, Lab In [facility] (03/02/25 09:14:50)                   Narrative:    Test Reason : R56.9,    Vent. Rate :  77 BPM     Atrial Rate :  77 BPM     P-R Int : 136 ms          QRS Dur :  86 ms      QT Int : 404 ms       P-R-T Axes :  54  79  26 degrees    QTcB Int : 457 ms    Normal sinus rhythm with sinus arrhythmia  Normal ECG  When compared with ECG of 18-Sep-2024 10:17,  No significant change was found  Confirmed by Rasta Stanley ([id number]) on 3/2/2025 9:14:44 AM    Referred By: AAAREFERRAL SELF           Confirmed By: Rasta Stanley                                  Imaging  Results    None          Medications   sodium chloride 0.9% bolus 1,000 mL 1,000 mL (0 mLs Intravenous Stopped 3/1/25 1918)     Medical Decision Making  DDX: orthostatic syncope, arrhythmia, seizure, electrolyte abnormality, dehydration, heat exhaustion    Patient w newly diagnosed seizures that she reports are more like absence seizures. She has never had a generalized tonic clonic seizure.     Discussed with neurology Dr. Flaquita Oconnell and she will get patient into neuro clinic  If seizure, was provoked by getting overheated so no need for medication change  Also possible she ad heat exhaustion  But she feels better and is tolerating po, no signs of rhabdomyolysis  Discharged to home in stable condition, return to ED warnings given, follow up and patient care instructions given.        Amount and/or Complexity of Data Reviewed  Labs: ordered.  ECG/medicine tests: ordered and independent interpretation performed. Decision-making details documented in ED Course.    EKG independently interpreted by me done today at 5:43 p.m. normal sinus rhythm with a sinus arrhythmia rate of 77 normal axis   Compared with EKG from 09/18/2024 no significant change        ED Course as of 03/06/25 2144   Sat Mar 01, 2025   1935 Patient feeling better, drinking water, brought her juice and crackers [GK]      ED Course User Index  [GK] Maricarmen Alejandra MD                           Clinical Impression:  Final diagnoses:  [R56.9] Seizure  [R56.9] Observed seizure-like activity (Primary)  [R55] Syncope, unspecified syncope type          ED Disposition Condition    Discharge Stable          ED Prescriptions    None       Follow-up Information       Follow up With Specialties Details Why Contact Info Additional Information    [facility] - Neurology 7th Fl Neurology Schedule an appointment as soon as possible for a visit  Dr. Flaquita Oconnell sent a message to her  to try and get you in clinic in the next few weeks. [address]  [facility] [address]  [phone] [facility] - Main Building, 7th Floor Please park in [facility] and take Clinic elevator                 [1]   Social History  Tobacco Use    Smoking status: Never    Smokeless tobacco: Never   Substance Use Topics    Drug use: Never        Maricarmen Alejandra MD  03/06/25 [id number]

## 2025-03-01 NOTE — PROGRESS NOTES
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    Ramya was participating in a singles tennis match vs [facility] when her teammate came and got me stating that Ramya needs me. I run over to the court where she's playing, and she is sitting on the ground while another teammate is holding ice on Ramya's head. I ask Ramya how she's feeling, and she stated she felt really bad. I let the referee know that Ramya cannot finish. Ramya starts to cry because she wanted to finish the match, however she could not stand up on her own. I get the male teammates to help me carry Ramya into the training room.     While Ramya is on the treatment table with her legs elevated, she is responsive as me and the teammate speak to her, but her eyes are closed. Blood pressure was taken at 2:15pm with a reading of 138/89, 120 heart rate, pulse oximeter reading 98%. Ramya started to have small jerking motions, and was barely responsive. She would nod in response, instead of speaking back. The jerking motions got bigger, mimicking a seizure, and with the help of her teammate, we turned her on her side; still monitoring pulse oximeter.     When her pulse oximeter dropped to 89% I activated EMS by calling Dr. Luna first at 2:27pm, then calling 911 at 2:28pm, while the teammate called [employer]. While waiting for EMS to arrive, pulse oximeter dropped as low as 80%, then slowly supriya back to 95+ %. By the time EMS arrive, the jerking motions reduced significantly back to the small jerking motions and pulse oximeter was constantly reading above 95% with heart rate at 118.     [facility] arrived first around 2:40p, [facility]. Her teammate rode with her to the hospital. I then notified Dr. Luna where she's being transported  to.    Handedness: right-handed  Sport played: tennis      Level: college              Assessment:     Status: Out    Date Seen:  03/01/2025    Date of Injury:  n/a    Date Out:  n/a    Date Cleared:  n/a    Plan:     1. F/u with Dr. Luna once discharged from ED  2. Physician Referral: yes  3. ED Referral:yes  4. Parent/Guardian Notified: No  5. All questions were answered, ath. will contact me for questions or concerns in the interim.  6.         Eligible to use School Insurance: Yes

## 2025-03-02 LAB
OHS QRS DURATION: 86 MS
OHS QTC CALCULATION: 457 MS

## 2025-03-02 NOTE — DISCHARGE INSTRUCTIONS
I recommend rest and hydration. Avoid physical exertion until you are feeling well and back to normal. Eat healthy regular meals and drink plenty of fluid. Get plenty of sleep and rest over the next few days.    Continue taking your medications as you regularly do.    You will need to follow up with the neurology clinic.    Do not drive or operate heavy machinery, swim or bath in a bath tub alone, climb ladders or do anything that would be dangerous if you seized while doing it.

## 2025-03-03 ENCOUNTER — LAB VISIT (OUTPATIENT)
Dept: LAB | Facility: HOSPITAL | Age: 25
End: 2025-03-03
Attending: STUDENT IN AN ORGANIZED HEALTH CARE EDUCATION/TRAINING PROGRAM
Payer: COMMERCIAL

## 2025-03-03 ENCOUNTER — OFFICE VISIT (OUTPATIENT)
Dept: SPORTS MEDICINE | Facility: CLINIC | Age: 25
End: 2025-03-03
Payer: COMMERCIAL

## 2025-03-03 VITALS
HEIGHT: 62 IN | HEART RATE: 62 BPM | BODY MASS INDEX: 26.31 KG/M2 | SYSTOLIC BLOOD PRESSURE: 120 MMHG | DIASTOLIC BLOOD PRESSURE: 77 MMHG | WEIGHT: 142.94 LBS

## 2025-03-03 DIAGNOSIS — T67.5XXA HEAT EXHAUSTION, INITIAL ENCOUNTER: ICD-10-CM

## 2025-03-03 DIAGNOSIS — T67.1XXA HEAT SYNCOPE, INITIAL ENCOUNTER: ICD-10-CM

## 2025-03-03 DIAGNOSIS — R56.9 SEIZURE: Primary | ICD-10-CM

## 2025-03-03 DIAGNOSIS — M62.82 EXERTIONAL RHABDOMYOLYSIS: ICD-10-CM

## 2025-03-03 PROCEDURE — 82550 ASSAY OF CK (CPK): CPT | Performed by: STUDENT IN AN ORGANIZED HEALTH CARE EDUCATION/TRAINING PROGRAM

## 2025-03-03 PROCEDURE — 99215 OFFICE O/P EST HI 40 MIN: CPT | Mod: S$GLB,,, | Performed by: STUDENT IN AN ORGANIZED HEALTH CARE EDUCATION/TRAINING PROGRAM

## 2025-03-03 PROCEDURE — 99999 PR PBB SHADOW E&M-EST. PATIENT-LVL III: CPT | Mod: PBBFAC,,, | Performed by: STUDENT IN AN ORGANIZED HEALTH CARE EDUCATION/TRAINING PROGRAM

## 2025-03-03 PROCEDURE — 36415 COLL VENOUS BLD VENIPUNCTURE: CPT | Mod: PN | Performed by: STUDENT IN AN ORGANIZED HEALTH CARE EDUCATION/TRAINING PROGRAM

## 2025-03-03 NOTE — PROGRESS NOTES
CC: Syncope and seizure like activity    HPI:  Ramya, is a 24 y.o. 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 She presents today for evaluation, following her recent EMS transport and visit to the ED for syncope and seizure like acitivty.  She is here today with her teammate who was present for the duration of the visit. Recall, from her previous training room notes, she was recently diagnosed with seizures, as confirmed on brain EEG back home this past December 2024. She has been stable, on her medication Clobazam, without any recent episodes until this past weekend.     She reports, on 3/1/25, she was playing in her single's tennis match when she started to feel faint. She reports once she sat down she had a syncopal episode. Her teammate reports she had the syncopal episode on the court and was then brought indoors to the athletic training room. Her teammate reports 10-15 minutes after she was brought indoors she started experiencing seizure like activity. Her teammate reports the seizure like activity consisted of upper extremity jerking and occurred on and off for about 10 minutes. Her teammate reports her  took her vitals, which revealed a heart rate of 140s and pulse oximetry of 80% at the time. Her teammate reports once she started experiencing seizure like activity her , Nakia Levi, Called 911. Her teammate reports she was then transported to the ED via EMS.     Her teammate reports she accompanied her to the ED. Her teammate reports she did not wake up until 6 pm. She was determined to have seizure like activity, labs were obtained in conjunction with ECG. Neurology was consulted. She received fluids for her dehydration. And she was discharge home once she stabilized.     She reports she went home to eat and sleep. She reports she woke up yesterday with body aches all over her body. She reports she feels worse today than she felt yesterday. She reports feelings  "of fatigue, headache, body aches, nausea, heavy eyes, numbness and tingling in hands and feet. She reports having a decreased appetite but reports once she eats she feels much better. She reports she has been drinking plenty of water and electrolytes.      Of note, the day of her tennis match she reports she played 3 hours of strenuous tennis, starting with a doubles match, followed by a single's match prior to the onset of symptoms. She reports it was hot and she was dehydrated. She reports she did not eat the entire day and also did not take her seizure medication, as she normally takes it in the afternoons. She also reports, that she just started resuming tennis activity 1 month ago and had not yet returned to full activity. She was still on a modified tennis practice schedule, in terms of volume.      PAST MEDICAL HISTORY:   No past medical history on file.    PAST SURGICAL HISTORY:  Past Surgical History:   Procedure Laterality Date    FUSION, JOINT, WRIST Right     right       FAMILY HISTORY:  No family history on file.    SOCIAL HISTORY:  Social History[1]    MEDICATIONS:   Current Medications[2]    ALLERGIES:   Review of patient's allergies indicates:   Allergen Reactions    Peanut Rash      PHYSICAL EXAMINATION:  /77   Pulse 62   Ht 5' 2" (1.575 m)   Wt 64.8 kg (142 lb 15.5 oz)   BMI 26.15 kg/m²   Vitals signs and nursing note have been reviewed.  General: In no acute distress, well developed, well nourished, no diaphoresis  Eyes: EOM full and smooth, no eye redness or discharge  HENT: normocephalic and atraumatic, neck supple, trachea midline, no nasal discharge  Cardiovascular: RRR, S1, S2 appreciate, no murmurs, rubs, or gallops  Lungs: CTAB, respirations non-labored, no conversational dyspnea   Neuro: alert & oriented   Skin: No rashes, warm and dry  Psychiatric: cooperative, pleasant, mood and affect appropriate for age  MSK: tenderness to palpation of the proximal shoulder muscles " bilaterally, along the anterior chest wall, poster upper and lower back, abdominal muscles, quadriceps muscles bilaterally, and posterior calf muscles bilaterally    ASSESSMENT:      ICD-10-CM ICD-9-CM   1. Seizure  R56.9 780.39   2. Heat syncope, initial encounter  T67.1XXA 992.1   3. 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, who presents to clinic with recent syncope and seizure like activity likely sustained during her tennis match. Her symptoms, likely originated with dehydration which lead to heat syncope followed by seizure like activity and rhabdomyolysis. She will benefit from conservative treatment at this time. Please see detailed plan below.     Patient is not clear to return to sport activity at this time. Additional labs ordered, CPK and Uranalysis, to help confirm rhabdomyolysis and guide treatment.     2.   Patient educated, that her symptoms were likely due to her dehydration, which lowered her seizure threshold  leading to seizure and heat induced syncope. We discussed the importance of appropriately fueling her body, to ensure she does not lower her seizure threshold.     3.   Educated the patient on hydration, adequate fueling, adequate urination, rest to allow for muscle recovery, and easing back into activity once cleared to return.      4.   Follow-up in 1 week, in the training room, for reassessment or sooner if needed.     5.   Future planning:            - Once patient has resolution of muscle soreness, adequate hydration, and reassuring lab results he will be cleared to slowly progress back to activity over 10-14 days to allow for adequate acclimatization under the supervision of his school      I spent a total of 40 minutes on the day of the visit.This includes face to face time and non-face to face time preparing to see the patient (eg, review of tests), obtaining and/or reviewing  separately obtained history, documenting clinical information in the electronic or other health record, independently interpreting results and communicating results to the patient/family/caregiver, or care coordinator.       [1]   Social History  Socioeconomic History    Marital status: Single   Tobacco Use    Smoking status: Never    Smokeless tobacco: Never   Substance and Sexual Activity    Drug use: Never    Sexual activity: Yes     Social Drivers of Health     Financial Resource Strain: High Risk (3/2/2025)    Overall Financial Resource Strain (CARDIA)     Difficulty of Paying Living Expenses: Hard   Food Insecurity: Food Insecurity Present (3/2/2025)    Hunger Vital Sign     Worried About Running Out of Food in the Last Year: Often true     Ran Out of Food in the Last Year: Often true   Transportation Needs: Unmet Transportation Needs (3/2/2025)    PRAPARE - Transportation     Lack of Transportation (Medical): Yes     Lack of Transportation (Non-Medical): Yes   Physical Activity: Sufficiently Active (3/2/2025)    Exercise Vital Sign     Days of Exercise per Week: 6 days     Minutes of Exercise per Session: 90 min   Stress: Stress Concern Present (3/2/2025)    [employer] - Occupational Stress Questionnaire     Feeling of Stress : Rather much   Housing Stability: High Risk (3/2/2025)    Housing Stability Vital Sign     Unable to Pay for Housing in the Last Year: Yes     Homeless in the Last Year: No   [2]   Current Outpatient Medications:     hydrOXYzine HCL (ATARAX) 25 MG tablet, Take 2 tablets (50 mg total) by mouth 4 (four) times daily as needed for Anxiety., Disp: 30 tablet, Rfl: 1    paroxetine (PAXIL) 20 MG tablet, Take 1 tablet (20 mg total) by mouth every morning., Disp: 30 tablet, Rfl: 3

## 2025-03-04 ENCOUNTER — TELEPHONE (OUTPATIENT)
Facility: OTHER | Age: 25
End: 2025-03-04
Payer: COMMERCIAL

## 2025-03-04 LAB — CK SERPL-CCNC: 110 U/L (ref 20–180)

## 2025-03-04 NOTE — PROGRESS NOTES
Patient see in the ED on 3.1.2025 for seizures.  Patient's call escalated to post ED text tracker.  Spoke with patient to see if she had any concerns or questions to be addressed.  Patient requested neurology appointment to be scheduled with Dr. Flaquita Oconnell at [facility].  Unable to schedule appointment within reasonable timeframe.  In-basket referral message sent to Flaquita Oconnell MD clinical support staff for further assistance with scheduling.  Due to patient being an international patient, patient instructed to contact [phone] for further assistance with scheduling.  Patient verbalized understanding and appreciative for assistance.  Patient will reach out to number provided on 3/5/2025 when the clinic reopens.  Encounter closed at this time.

## 2025-03-05 ENCOUNTER — HOSPITAL ENCOUNTER (EMERGENCY)
Facility: HOSPITAL | Age: 25
Discharge: HOME | End: 2025-03-05
Attending: EMERGENCY MEDICINE
Payer: COMMERCIAL

## 2025-03-05 VITALS
DIASTOLIC BLOOD PRESSURE: 73 MMHG | HEART RATE: 62 BPM | WEIGHT: 142 LBS | HEIGHT: 62 IN | SYSTOLIC BLOOD PRESSURE: 105 MMHG | TEMPERATURE: 98 F | BODY MASS INDEX: 26.13 KG/M2 | RESPIRATION RATE: 18 BRPM | OXYGEN SATURATION: 97 %

## 2025-03-05 DIAGNOSIS — R51.9 ACUTE NONINTRACTABLE HEADACHE, UNSPECIFIED HEADACHE TYPE: Primary | ICD-10-CM

## 2025-03-05 DIAGNOSIS — R11.0 NAUSEA: ICD-10-CM

## 2025-03-05 DIAGNOSIS — E86.0 DEHYDRATION: ICD-10-CM

## 2025-03-05 DIAGNOSIS — M62.82 EXERTIONAL RHABDOMYOLYSIS: Primary | ICD-10-CM

## 2025-03-05 DIAGNOSIS — R53.83 FATIGUE, UNSPECIFIED TYPE: ICD-10-CM

## 2025-03-05 LAB
ALBUMIN SERPL BCP-MCNC: 4 G/DL (ref 3.5–5.2)
ALP SERPL-CCNC: 53 U/L (ref 40–150)
ALT SERPL W/O P-5'-P-CCNC: 13 U/L (ref 10–44)
ANION GAP SERPL CALC-SCNC: 7 MMOL/L (ref 8–16)
AST SERPL-CCNC: 20 U/L (ref 10–40)
B-HCG UR QL: NEGATIVE
BASOPHILS # BLD AUTO: 0.04 K/UL (ref 0–0.2)
BASOPHILS NFR BLD: 0.6 % (ref 0–1.9)
BILIRUB SERPL-MCNC: 0.3 MG/DL (ref 0.1–1)
BILIRUB UR QL STRIP: NEGATIVE
BUN SERPL-MCNC: 7 MG/DL (ref 6–20)
CALCIUM SERPL-MCNC: 9 MG/DL (ref 8.7–10.5)
CHLORIDE SERPL-SCNC: 108 MMOL/L (ref 95–110)
CK SERPL-CCNC: 87 U/L (ref 20–180)
CLARITY UR REFRACT.AUTO: CLEAR
CO2 SERPL-SCNC: 24 MMOL/L (ref 23–29)
COLOR UR AUTO: COLORLESS
CREAT SERPL-MCNC: 0.7 MG/DL (ref 0.5–1.4)
CTP QC/QA: YES
DIFFERENTIAL METHOD BLD: NORMAL
EOSINOPHIL # BLD AUTO: 0.3 K/UL (ref 0–0.5)
EOSINOPHIL NFR BLD: 4.3 % (ref 0–8)
ERYTHROCYTE [DISTWIDTH] IN BLOOD BY AUTOMATED COUNT: 11.5 % (ref 11.5–14.5)
EST. GFR  (NO RACE VARIABLE): >60 ML/MIN/1.73 M^2
GLUCOSE SERPL-MCNC: 95 MG/DL (ref 70–110)
GLUCOSE UR QL STRIP: NEGATIVE
HCT VFR BLD AUTO: 39 % (ref 37–48.5)
HCV AB SERPL QL IA: NORMAL
HGB BLD-MCNC: 13 G/DL (ref 12–16)
HGB UR QL STRIP: NEGATIVE
HIV 1+2 AB+HIV1 P24 AG SERPL QL IA: NORMAL
IMM GRANULOCYTES # BLD AUTO: 0.02 K/UL (ref 0–0.04)
IMM GRANULOCYTES NFR BLD AUTO: 0.3 % (ref 0–0.5)
KETONES UR QL STRIP: NEGATIVE
LEUKOCYTE ESTERASE UR QL STRIP: NEGATIVE
LYMPHOCYTES # BLD AUTO: 1.8 K/UL (ref 1–4.8)
LYMPHOCYTES NFR BLD: 26.1 % (ref 18–48)
MCH RBC QN AUTO: 29.4 PG (ref 27–31)
MCHC RBC AUTO-ENTMCNC: 33.3 G/DL (ref 32–36)
MCV RBC AUTO: 88 FL (ref 82–98)
MONOCYTES # BLD AUTO: 0.4 K/UL (ref 0.3–1)
MONOCYTES NFR BLD: 6.1 % (ref 4–15)
NEUTROPHILS # BLD AUTO: 4.2 K/UL (ref 1.8–7.7)
NEUTROPHILS NFR BLD: 62.6 % (ref 38–73)
NITRITE UR QL STRIP: NEGATIVE
NRBC BLD-RTO: 0 /100 WBC
PH UR STRIP: 6 [PH] (ref 5–8)
PLATELET # BLD AUTO: 316 K/UL (ref 150–450)
PMV BLD AUTO: 10.3 FL (ref 9.2–12.9)
POTASSIUM SERPL-SCNC: 4 MMOL/L (ref 3.5–5.1)
PROT SERPL-MCNC: 7.4 G/DL (ref 6–8.4)
PROT UR QL STRIP: NEGATIVE
RBC # BLD AUTO: 4.42 M/UL (ref 4–5.4)
SODIUM SERPL-SCNC: 139 MMOL/L (ref 136–145)
SP GR UR STRIP: 1 (ref 1–1.03)
URN SPEC COLLECT METH UR: ABNORMAL
WBC # BLD AUTO: 6.74 K/UL (ref 3.9–12.7)

## 2025-03-05 PROCEDURE — 82550 ASSAY OF CK (CPK): CPT | Performed by: STUDENT IN AN ORGANIZED HEALTH CARE EDUCATION/TRAINING PROGRAM

## 2025-03-05 PROCEDURE — 96374 THER/PROPH/DIAG INJ IV PUSH: CPT

## 2025-03-05 PROCEDURE — 80053 COMPREHEN METABOLIC PANEL: CPT | Performed by: STUDENT IN AN ORGANIZED HEALTH CARE EDUCATION/TRAINING PROGRAM

## 2025-03-05 PROCEDURE — 96361 HYDRATE IV INFUSION ADD-ON: CPT

## 2025-03-05 PROCEDURE — 63600175 PHARM REV CODE 636 W HCPCS: Performed by: EMERGENCY MEDICINE

## 2025-03-05 PROCEDURE — 86803 HEPATITIS C AB TEST: CPT | Performed by: PHYSICIAN ASSISTANT

## 2025-03-05 PROCEDURE — 87389 HIV-1 AG W/HIV-1&-2 AB AG IA: CPT | Performed by: PHYSICIAN ASSISTANT

## 2025-03-05 PROCEDURE — 99285 EMERGENCY DEPT VISIT HI MDM: CPT | Mod: 25

## 2025-03-05 PROCEDURE — 85025 COMPLETE CBC W/AUTO DIFF WBC: CPT | Performed by: STUDENT IN AN ORGANIZED HEALTH CARE EDUCATION/TRAINING PROGRAM

## 2025-03-05 PROCEDURE — 63600175 PHARM REV CODE 636 W HCPCS: Performed by: STUDENT IN AN ORGANIZED HEALTH CARE EDUCATION/TRAINING PROGRAM

## 2025-03-05 PROCEDURE — 81025 URINE PREGNANCY TEST: CPT | Performed by: STUDENT IN AN ORGANIZED HEALTH CARE EDUCATION/TRAINING PROGRAM

## 2025-03-05 PROCEDURE — 81003 URINALYSIS AUTO W/O SCOPE: CPT | Performed by: STUDENT IN AN ORGANIZED HEALTH CARE EDUCATION/TRAINING PROGRAM

## 2025-03-05 RX ORDER — ONDANSETRON 4 MG/1
4 TABLET, ORALLY DISINTEGRATING ORAL EVERY 6 HOURS PRN
Qty: 10 TABLET | Refills: 0 | Status: SHIPPED | OUTPATIENT
Start: 2025-03-05

## 2025-03-05 RX ORDER — PROCHLORPERAZINE EDISYLATE 5 MG/ML
5 INJECTION INTRAMUSCULAR; INTRAVENOUS
Status: COMPLETED | OUTPATIENT
Start: 2025-03-05 | End: 2025-03-05

## 2025-03-05 RX ADMIN — SODIUM CHLORIDE, POTASSIUM CHLORIDE, SODIUM LACTATE AND CALCIUM CHLORIDE 1000 ML: 600; 310; 30; 20 INJECTION, SOLUTION INTRAVENOUS at 06:03

## 2025-03-05 RX ADMIN — PROCHLORPERAZINE EDISYLATE 5 MG: 5 INJECTION INTRAMUSCULAR; INTRAVENOUS at 06:03

## 2025-03-05 NOTE — FIRST PROVIDER EVALUATION
"Medical screening examination initiated.  I have conducted a focused provider triage encounter, findings are as follows:    Brief history of present illness:      Recent visit for rhabdo and dehydration, athlete  Sent from outpatient today      Vitals:    03/05/25 1611   BP: (!) 129/93   Pulse: 66   Resp: 20   Temp: 98.7 °F (37.1 °C)   TempSrc: Oral   SpO2: 99%   Weight: 64.4 kg (142 lb)   Height: 5' 2" (1.575 m)       Pertinent physical exam:      A&OX3    Brief workup plan:      Labs, CK, IVF    Preliminary workup initiated; this workup will be continued and followed by the physician or advanced practice provider that is assigned to the patient when roomed.  "

## 2025-03-05 NOTE — ED TRIAGE NOTES
Annmarie Ruiz, a 24 y.o. female presents to the ED w/ complaint of weakness/fatigue. Pt was seen and treated for seizure on Saturday and is still having symptoms. Pts primary care sent her to ED for further eval.     Triage note:  Chief Complaint   Patient presents with    Multiple Complaints     Here on Saturday for seizure. Still experience fatigue, nausea bodyaches and HA sent from primary care for further eval

## 2025-03-05 NOTE — PROGRESS NOTES
Athletic Training Room Note 03/05/2025  [facility]    : John     CC: Dehydration    HPI:   Patient presents to training room for follow-up of her dehydration, syncope, seizure, and rhabdomyolysis. She reports she can walk better, but otherwise still feels very weak, with body aches, headache, and nausea.     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 She presents today for evaluation, following her recent EMS transport and visit to the ED for syncope and seizure like acitivty.  She is here today with her teammate who was present for the duration of the visit. Recall, from her previous training room notes, she was recently diagnosed with seizures, as confirmed on brain EEG back home this past December 2024. She has been stable, on her medication Clobazam, without any recent episodes until this past weekend.     She reports, on 3/1/25, she was playing in her single's tennis match when she started to feel faint. She reports once she sat down she had a syncopal episode. Her teammate reports she had the syncopal episode on the court and was then brought indoors to the athletic training room. Her teammate reports 10-15 minutes after she was brought indoors she started experiencing seizure like activity. Her teammate reports the seizure like activity consisted of upper extremity jerking and occurred on and off for about 10 minutes. Her teammate reports her  took her vitals, which revealed a heart rate of 140s and pulse oximetry of 80% at the time. Her teammate reports once she started experiencing seizure like activity her , Nakia Levi, Called 911. Her teammate reports she was then transported to the ED via EMS.     Her teammate reports she accompanied her to the ED. Her teammate reports she did not wake up until 6 pm. She was determined to have seizure like activity, labs were obtained in  conjunction with ECG. Neurology was consulted. She received fluids for her dehydration. And she was discharge home once she stabilized.     She reports she went home to eat and sleep. She reports she woke up yesterday with body aches all over her body. She reports she feels worse today than she felt yesterday. She reports feelings of fatigue, headache, body aches, nausea, heavy eyes, numbness and tingling in hands and feet. She reports having a decreased appetite but reports once she eats she feels much better. She reports she has been drinking plenty of water and electrolytes.      Of note, the day of her tennis match she reports she played 3 hours of strenuous tennis, starting with a doubles match, followed by a single's match prior to the onset of symptoms. She reports it was hot and she was dehydrated. She reports she did not eat the entire day and also did not take her seizure medication, as she normally takes it in the afternoons. She also reports, that she just started resuming tennis activity 1 month ago and had not yet returned to full activity. She was still on a modified tennis practice schedule, in terms of volume.      PAST MEDICAL HISTORY:   No past medical history on file.    PAST SURGICAL HISTORY:  Past Surgical History:   Procedure Laterality Date    FUSION, JOINT, WRIST Right     right     FAMILY HISTORY:  No family history on file.    SOCIAL HISTORY:  Social History[1]    MEDICATIONS:   Current Medications[2]    ALLERGIES:   Review of patient's allergies indicates:   Allergen Reactions    Peanut Rash      PHYSICAL EXAMINATION:  There were no vitals taken for this visit.  Vitals signs and nursing note have been reviewed.  General: In no acute distress, well developed, well nourished, no diaphoresis  Eyes: EOM full and smooth, no eye redness or discharge  Mouth: cracked lips  HENT: lower, posterior cervical lymphadenopathy   Cardiovascular: RRR, S1, S2 appreciate, no murmurs, rubs, or  gallops  Lungs: CTAB, respirations non-labored, no conversational dyspnea   Neuro: alert & oriented   Skin: No rashes, warm and dry  Psychiatric: cooperative, pleasant, mood and affect appropriate for age  MSK:   - tenderness to palpation of the proximal shoulder muscles bilaterally, along the anterior chest wall, poster upper and lower back, abdominal muscles (improved), quadriceps muscles bilaterally (improved), and posterior calf muscles bilaterally  - 2/5 muscle strength throughout upper and lower extremity     ASSESSMENT:      ICD-10-CM ICD-9-CM   1. Exertional rhabdomyolysis  M62.82 728.88   2. 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 Her symptoms, likely originated with dehydration which lead to heat syncope followed by seizure like activity and rhabdomyolysis. Today's exam continues to reflect significant dehydration, with mild improvements. She will require IV hydration to help her dehydration as she has been compliant with drinking 10-12 bottles of water daily, with minimal improvement. Additionally, with her full body aches and elevated white count, would like to rule out flu as a source of her dehydration/body aches.              [1]   Social History  Socioeconomic History    Marital status: Single   Tobacco Use    Smoking status: Never    Smokeless tobacco: Never   Substance and Sexual Activity    Drug use: Never    Sexual activity: Yes     Social Drivers of Health     Financial Resource Strain: High Risk (3/2/2025)    Overall Financial Resource Strain (CARDIA)     Difficulty of Paying Living Expenses: Hard   Food Insecurity: Food Insecurity Present (3/2/2025)    Hunger Vital Sign     Worried About Running Out of Food in the Last Year: Often true     Ran Out of Food in the Last Year: Often  true   Transportation Needs: Unmet Transportation Needs (3/2/2025)    PRAPARE - Transportation     Lack of Transportation (Medical): Yes     Lack of Transportation (Non-Medical): Yes   Physical Activity: Sufficiently Active (3/2/2025)    Exercise Vital Sign     Days of Exercise per Week: 6 days     Minutes of Exercise per Session: 90 min   Stress: Stress Concern Present (3/2/2025)    [employer] - Occupational Stress Questionnaire     Feeling of Stress : Rather much   Housing Stability: High Risk (3/2/2025)    Housing Stability Vital Sign     Unable to Pay for Housing in the Last Year: Yes     Homeless in the Last Year: No   [2]   Current Outpatient Medications:     hydrOXYzine HCL (ATARAX) 25 MG tablet, Take 2 tablets (50 mg total) by mouth 4 (four) times daily as needed for Anxiety., Disp: 30 tablet, Rfl: 1    paroxetine (PAXIL) 20 MG tablet, Take 1 tablet (20 mg total) by mouth every morning., Disp: 30 tablet, Rfl: 3

## 2025-03-05 NOTE — ED PROVIDER NOTES
Emergency Department Provider Note    Annmarie Ruiz   24 y.o. female   [id number]      3/5/2025       History     This history was obtained from the patient without limitations.  She was driven to the ED by a friend who was at the bedside.      She is a 24-year-old with unclear past medical history.  She started having syncopal episodes last year.  There was some concern that she may have been having seizures, so she was started on an antiepileptic medication.  She is compliant with that medication.  She was evaluated here in the ED a few days ago after an episode of seizure-like activity.  She presents today with intermittent bifrontal and posterior headache since that visit.  Additionally, she complains of fatigue, nausea, palpitations, and lightheadedness.  She denies fever and chills.  She denies chest pain and palpitations.  She denies shortness of breath.  She denies abdominal pain, vomiting, and diarrhea.  She denies difficulty urinating and dysuria.  She denies tobacco, alcohol, and recreational drug use.         History reviewed. No pertinent past medical history.   Past Surgical History:   Procedure Laterality Date    FUSION, JOINT, WRIST Right     right      No family history on file.   Social History     Socioeconomic History    Marital status: Single   Tobacco Use    Smoking status: Never    Smokeless tobacco: Never   Substance and Sexual Activity    Drug use: Never    Sexual activity: Yes     Social Drivers of Health     Financial Resource Strain: High Risk (3/2/2025)    Overall Financial Resource Strain (CARDIA)     Difficulty of Paying Living Expenses: Hard   Food Insecurity: Food Insecurity Present (3/2/2025)    Hunger Vital Sign     Worried About Running Out of Food in the Last Year: Often true     Ran Out of Food in the Last Year: Often true   Transportation Needs: Unmet Transportation Needs (3/2/2025)    PRAPARE - Transportation     Lack of Transportation (Medical): Yes     Lack of  Transportation (Non-Medical): Yes   Physical Activity: Sufficiently Active (3/2/2025)    Exercise Vital Sign     Days of Exercise per Week: 6 days     Minutes of Exercise per Session: 90 min   Stress: Stress Concern Present (3/2/2025)    [employer] - Occupational Stress Questionnaire     Feeling of Stress : Rather much   Housing Stability: High Risk (3/2/2025)    Housing Stability Vital Sign     Unable to Pay for Housing in the Last Year: Yes     Homeless in the Last Year: No      Review of patient's allergies indicates:   Allergen Reactions    Peanut Rash           Physical Examination     Initial Vitals [03/05/25 1611]   BP Pulse Resp Temp SpO2   (!) 129/93 66 20 98.7 °F (37.1 °C) 99 %      MAP       --           Physical Exam    Nursing note and vitals reviewed.  Constitutional: She is not diaphoretic. No distress.   HENT:   Head: Normocephalic and atraumatic. Mouth/Throat: Oropharynx is clear and moist.   Eyes: Conjunctivae and EOM are normal. Pupils are equal, round, and reactive to light. No scleral icterus. Right eye exhibits no nystagmus. Left eye exhibits no nystagmus.   Neck: No JVD present.   Cardiovascular:  Normal rate, regular rhythm and normal heart sounds.     Exam reveals no gallop and no friction rub.       No murmur heard.  Pulmonary/Chest: Effort normal and breath sounds normal. No stridor. No respiratory distress. She has no decreased breath sounds. She has no wheezes. She has no rhonchi. She has no rales.   Abdominal: Abdomen is soft. She exhibits no distension. There is no abdominal tenderness.     Neurological: She is alert and oriented to person, place, and time. She has normal strength. No cranial nerve deficit. Coordination and gait normal. GCS score is 15. GCS eye subscore is 4. GCS verbal subscore is 5. GCS motor subscore is 6.   Skin: Skin is warm and dry. No pallor.   Psychiatric: She has a normal mood and affect. Her speech is normal and behavior is  normal. She is not actively hallucinating. She is attentive.            Labs     Labs Reviewed   COMPREHENSIVE METABOLIC PANEL - Abnormal       Result Value    Sodium 139      Potassium 4.0      Chloride 108      CO2 24      Glucose 95      BUN 7      Creatinine 0.7      Calcium 9.0      Total Protein 7.4      Albumin 4.0      Total Bilirubin 0.3      Alkaline Phosphatase 53      AST 20      ALT 13      eGFR >60.0      Anion Gap 7 (*)     Narrative:     Release to patient->Immediate   URINALYSIS, REFLEX TO URINE CULTURE - Abnormal    Specimen UA Urine, Clean Catch      Color, UA Colorless (*)     Appearance, UA Clear      pH, UA 6.0      Specific Gravity, UA 1.005      Protein, UA Negative      Glucose, UA Negative      Ketones, UA Negative      Bilirubin (UA) Negative      Occult Blood UA Negative      Nitrite, UA Negative      Leukocytes, UA Negative      Narrative:     Specimen Source->Urine   CBC W/ AUTO DIFFERENTIAL    WBC 6.74      RBC 4.42      Hemoglobin 13.0      Hematocrit 39.0      MCV 88      MCH 29.4      MCHC 33.3      RDW 11.5      Platelets 316      MPV 10.3      Immature Granulocytes 0.3      Gran # (ANC) 4.2      Immature Grans (Abs) 0.02      Lymph # 1.8      Mono # 0.4      Eos # 0.3      Baso # 0.04      nRBC 0      Gran % 62.6      Lymph % 26.1      Mono % 6.1      Eosinophil % 4.3      Basophil % 0.6      Differential Method Automated      Narrative:     Release to patient->Immediate   CK    CPK 87      Narrative:     Release to patient->Immediate   HEPATITIS C ANTIBODY    Hepatitis C Ab Non-reactive      Narrative:     Release to patient->Immediate   HIV 1 / 2 ANTIBODY    HIV 1/2 Ag/Ab Non-reactive      Narrative:     Release to patient->Immediate   POCT URINE PREGNANCY    POC Preg Test, Ur Negative       Acceptable Yes          Imaging     Imaging Results              CT Head Without Contrast (Final result)  Result time 03/05/25 21:58:29      Final result by Deon Santiago,  DO (03/05/25 21:58:29)                   Impression:      No acute intracranial abnormality.      Electronically signed by: Deon Santiago  Date:    03/05/2025  Time:    21:58               Narrative:    EXAMINATION:  CT HEAD WITHOUT CONTRAST    CLINICAL HISTORY:  Seizure, new-onset, no history of trauma;    TECHNIQUE:  Low dose axial CT images obtained throughout the head without intravenous contrast. Sagittal and coronal reconstructions were performed.    COMPARISON:  None available.    FINDINGS:  Ventricles and sulci are normal in size for age without evidence of hydrocephalus. No extra-axial blood or fluid collections.  The brain parenchyma is normal. No parenchymal mass, hemorrhage, edema or major vascular distribution infarct.    No calvarial fracture.  The scalp is unremarkable.  Bilateral paranasal sinuses and mastoid air cells are clear.                                        ED Course     The patient received the following medications:  Medications   lactated ringers bolus 1,000 mL (0 mLs Intravenous Stopped 3/5/25 1953)   prochlorperazine injection Soln 5 mg (5 mg Intravenous Given 3/5/25 1852)           ED Course as of 03/07/25 1624   Wed Mar 05, 2025   2258 I just reassessed the patient and she was feeling better. [LP]      ED Course User Index  [LP] Reginald Bradley III, MD        Medical Decision Making                 Medical Decision Making  Differential diagnoses:  Dehydration, electrolyte anomalies, renal failure, rhabdomyolysis, pregnancy, UTI    The patient was well-appearing and in no distress.  She had no focal neurological deficits.  Pregnancy test was negative.  She had no concerning lab abnormalities.  She had subjective improvement after receiving IV fluids and a dose of IV prochlorperazine.  The exact etiology of her presenting complaints was unclear.  She had no findings warranting admission or emergent specialist consultation.  She was prescribed Zofran ODT and discharge.  She was  instructed to arrange close follow-up with her primary provider.  Standard ED return instructions were given.    Amount and/or Complexity of Data Reviewed  Radiology: ordered.    Risk  Prescription drug management.              Diagnoses       ICD-10-CM ICD-9-CM   1. Acute nonintractable headache, unspecified headache type  R51.9 784.0   2. Fatigue, unspecified type  R53.83 780.79   3. Nausea  R11.0 787.02         Dispostion      ED Disposition Condition    Discharge Stable            ED Prescriptions       Medication Sig Dispense Start Date End Date Auth. Provider    ondansetron (ZOFRAN-ODT) 4 MG TbDL Take 1 tablet (4 mg total) by mouth every 6 (six) hours as needed (nausea). 10 tablet 3/5/2025 -- Reginald Bradley III, MD            Follow-up Information       Follow up With Specialties Details Why Contact Info    Your primary care provider   Schedule a close in-person or virtual ER follow-up visit with your primary care provider.     ER   Return to the ER if your condition worsens or you have any other concerns that you feel need immediate attention.               Reginald Bradley III, MD  03/07/25 4428

## 2025-03-06 NOTE — DISCHARGE INSTRUCTIONS
We know that you have many choices and are honored that you chose us. We hope that we met or exceeded your expectations and goals for this visit and will keep the [facility] family in mind for your future needs and those of your family and friends.     - Dr. Bradley

## 2025-03-07 ENCOUNTER — PATIENT MESSAGE (OUTPATIENT)
Dept: NEUROLOGY | Facility: CLINIC | Age: 25
End: 2025-03-07
Payer: COMMERCIAL

## 2025-03-07 ENCOUNTER — TELEPHONE (OUTPATIENT)
Dept: NEUROLOGY | Facility: CLINIC | Age: 25
End: 2025-03-07
Payer: COMMERCIAL

## 2025-03-07 NOTE — TELEPHONE ENCOUNTER
Unable to LVM to offer next available appointment per Dr Oconnell's request.. Left direct contact information and sent portal message as well.

## 2025-03-07 NOTE — TELEPHONE ENCOUNTER
----- Message from Flaquita Oconnell sent at 3/4/2025 11:01 AM CST -----  Regarding: Clinic Appt  Hi,    She needs to establish care for seizures. Can you schedule her in clinic with me next block? Does not necessarily have to be with an epilepsy doc.    Flaquita

## 2025-03-10 ENCOUNTER — TELEPHONE (OUTPATIENT)
Dept: SPORTS MEDICINE | Facility: CLINIC | Age: 25
End: 2025-03-10
Payer: COMMERCIAL

## 2025-03-10 DIAGNOSIS — F41.0 PANIC ATTACKS: Primary | ICD-10-CM

## 2025-03-10 RX ORDER — ALPRAZOLAM 0.5 MG/1
0.5 TABLET ORAL 3 TIMES DAILY PRN
Qty: 21 TABLET | Refills: 0 | Status: SHIPPED | OUTPATIENT
Start: 2025-03-10 | End: 2025-03-17

## 2025-03-10 NOTE — TELEPHONE ENCOUNTER
Writer called patient, to follow-up on continued clinical symptoms. She continues to report weakness, dizziness, nausea, near syncopal events, and headaches despite adequate hydration. After discussion, it was determined that patient may be experiencing panic attacks, triggered from her recent set back with syncope and seizure like activity. We discussed treating with hydroxyzine, which she deferred and reports was ineffective in the past. Therefore, will try xanax, 0.5 mg po TID prn for panic attack symptoms. All questions answered.

## 2025-03-10 NOTE — TELEPHONE ENCOUNTER
"Spoke to patient regarding message below. Patient stated that she is feeling very nauseous, dizzy, lightheaded, headache, and chest pain. It was stated I would speak to Dr. Luna and have him give her a call.     Jossie Diaz, OTC  Clinical Assistant to Dr. Cresencio Luna, DO [facility]           ----- Message from [employer] sent at 3/10/2025  9:31 AM CDT -----  Pt Requesting Call BackWho called: ptWho called for pt:Best call back #:  [phone] notes: pt said she would like to speak to Dr. Luna about "everything that's happening to her"  "

## 2025-03-11 ENCOUNTER — TELEPHONE (OUTPATIENT)
Dept: SPORTS MEDICINE | Facility: CLINIC | Age: 25
End: 2025-03-11
Payer: COMMERCIAL

## 2025-03-11 NOTE — TELEPHONE ENCOUNTER
Spoke to patient to check and patient states she is still experiencing a severe headache with loss of appetite. She states she is staying hydrated and eating smaller portions of meals. It was stated to plan to see Dr. Luna in the Training room tomorrow around 3:30. Patient confirmed.     Jossie Diaz, OTC  Clinical Assistant to Dr. Cresencio Luna, DO [facility]

## 2025-03-12 DIAGNOSIS — H81.93 VESTIBULAR DYSFUNCTION OF BOTH EARS: Primary | ICD-10-CM

## 2025-03-12 DIAGNOSIS — R42 DIZZINESS: ICD-10-CM

## 2025-03-17 ENCOUNTER — OFFICE VISIT (OUTPATIENT)
Dept: NEUROLOGY | Facility: CLINIC | Age: 25
End: 2025-03-17
Payer: COMMERCIAL

## 2025-03-17 VITALS — DIASTOLIC BLOOD PRESSURE: 81 MMHG | SYSTOLIC BLOOD PRESSURE: 115 MMHG | HEART RATE: 60 BPM

## 2025-03-17 DIAGNOSIS — G44.209 TENSION HEADACHE: ICD-10-CM

## 2025-03-17 DIAGNOSIS — R56.9 SEIZURE-LIKE ACTIVITY: Primary | ICD-10-CM

## 2025-03-17 DIAGNOSIS — G43.711 INTRACTABLE CHRONIC MIGRAINE WITHOUT AURA AND WITH STATUS MIGRAINOSUS: ICD-10-CM

## 2025-03-17 PROCEDURE — 99999 PR PBB SHADOW E&M-EST. PATIENT-LVL III: CPT | Mod: PBBFAC,,,

## 2025-03-17 RX ORDER — FOLIC ACID 1 MG/1
4 TABLET ORAL DAILY
COMMUNITY
Start: 2025-03-17 | End: 2026-03-17

## 2025-03-17 RX ORDER — PROCHLORPERAZINE MALEATE 10 MG
10 TABLET ORAL ONCE
Qty: 1 TABLET | Refills: 0 | Status: SHIPPED | OUTPATIENT
Start: 2025-03-17 | End: 2025-03-17

## 2025-03-17 RX ORDER — TIZANIDINE 2 MG/1
2 TABLET ORAL EVERY 8 HOURS PRN
Qty: 21 TABLET | Refills: 0 | Status: SHIPPED | OUTPATIENT
Start: 2025-03-17 | End: 2026-03-17

## 2025-03-17 RX ORDER — KETOROLAC TROMETHAMINE 10 MG/1
20 TABLET, FILM COATED ORAL ONCE
Qty: 2 TABLET | Refills: 0 | Status: SHIPPED | OUTPATIENT
Start: 2025-03-17 | End: 2025-03-17

## 2025-03-17 RX ORDER — LANOLIN ALCOHOL/MO/W.PET/CERES
800 CREAM (GRAM) TOPICAL DAILY
Qty: 2 TABLET | Refills: 0 | Status: SHIPPED | OUTPATIENT
Start: 2025-03-17 | End: 2025-03-17

## 2025-03-17 RX ORDER — DROSPIRENONE AND ETHINYL ESTRADIOL 0.02-3(28)
1 KIT ORAL DAILY
COMMUNITY

## 2025-03-17 RX ORDER — METHYLPREDNISOLONE 4 MG/1
TABLET ORAL
Qty: 21 EACH | Refills: 0 | Status: SHIPPED | OUTPATIENT
Start: 2025-03-17 | End: 2025-04-07

## 2025-03-17 RX ORDER — RIBOFLAVIN (VITAMIN B2) 100 MG
400 TABLET ORAL DAILY
COMMUNITY
Start: 2025-03-17

## 2025-03-17 RX ORDER — CLOBAZAM 10 MG/1
10 TABLET ORAL NIGHTLY
COMMUNITY
End: 2025-03-19 | Stop reason: SDUPTHER

## 2025-03-17 RX ORDER — DIVALPROEX SODIUM 500 MG/1
1000 TABLET, FILM COATED, EXTENDED RELEASE ORAL ONCE
Qty: 2 TABLET | Refills: 0 | Status: SHIPPED | OUTPATIENT
Start: 2025-03-17 | End: 2025-03-17

## 2025-03-17 RX ORDER — CLOBAZAM 10 MG/1
5 TABLET ORAL
COMMUNITY
End: 2025-03-19 | Stop reason: SDUPTHER

## 2025-03-17 RX ORDER — LANOLIN ALCOHOL/MO/W.PET/CERES
800 CREAM (GRAM) TOPICAL ONCE
Qty: 2 TABLET | Refills: 0 | Status: SHIPPED | OUTPATIENT
Start: 2025-03-17 | End: 2025-03-17

## 2025-03-17 NOTE — ASSESSMENT & PLAN NOTE
- 30 minute EEG  - MRI Brain w/wo contrast (Epilepsy protocol)  - Probable EMU admission discussed with patient  - Continue Onfi for now  - Folic acid 4 mg po daily per ACOG guidelines  - Seizure precautions discussed and provded    RTC in 6 weeks

## 2025-03-17 NOTE — PATIENT INSTRUCTIONS
It is [place] state law that you do not drive for the next six months as you have had an episode of loss of consciousness.     Please do not take baths, swim alone, climb heights or operate heavy machinery as you have had an episode of loss of consciousness.        Reminders for Patients with Seizures  Safety issues -   You should not be in, on or around water by yourself. You may swim but only if with someone who could rescue you were a seizure to occur. Take a shower and not a bath. If you have a seizure while in a body of water you could drown. Drowning is an important cause of death in people with seizures which can be avoided.  No operating heavy machinery  No climbing ladders or standing near precipice where one could fall  Use back burners on stovetop  Wear helmet while riding bike, motorcycle, or horse  Driving   It is [facility] law that you do not drive for the next six months as you have had an episode of loss of consciousness.   Reproductive issues for females  There is a small increased risk of birth defects in mothers with epilepsy even if they are not taking antiepileptic medications.  It is important for women of child-bearing potential to take 4 mg of folic acid daily   We know that antiepileptic medications can further increase slightly the risk of birth defects. This risk is still very low and in most cases the risks of having seizures during pregnancy are far greater than the risks of the antiepileptic medications. Valproic acid (Depakote) is especially a problem during pregnancy and should be avoided if possible but your provider will discuss with you the pros and cons in your specific situation.  You should always plan a pregnancy and discuss a potential pregnancy with your physician before you become pregnant. You should always start prenatal vitamins including folate BEFORE becoming pregnant. By the time you know you are pregnant it is likely too late to get the benefits of the  prenatal vitamins if they have not already been started.

## 2025-03-17 NOTE — ASSESSMENT & PLAN NOTE
Tension headache     - PO migraine cocktail x 1 - Medrol dose pack, Compazine 10 mg, Toradol 20 mg, magnesium oxide 800 mg, Zanaflex 2 mg, Depakote 1000 mg   - Riboflavin 400 mg po daily & magnesium glycinate 400 mg po qhs for prophylaxis

## 2025-03-17 NOTE — PROGRESS NOTES
[facility] - NEUROLOGY [address] [facility], [place]    Date: 3/17/25  Patient Name: Annmarie Ruiz   MRN: [id number]   PCP: Nusrat, Primary Doctor  Referring Provider: Flaquita Oconnell MD    Assessment:     Annmarie Ruiz is a 24 y.o. female with PMH of migraines and MIMI presenting for evaluation of seizure-like activity. Two phenotypes (#1 ?bilateral? shaking +/- LOC, + urinary incontinence; #2 - syncope w/ LOC). Cardiologist work-up in [place] reportedly normal. EEG and MRI wo contrast in [place] reportedly normal as well but started on Onfi. Plan to repeat EEG and obtain MRI w/wo contrast, as well as likely EMU admission in the future.     Also c/o almost daily headache since August, previously diagnosed with migraine at age 19. Described as pressure/squeezing mainly around occiput but has photophobia, phonophobia, and nausea; as well as sensation of being underwater in her ears. Tension headache component given cervical paraspinal TTP. Sometimes so severe it wakes her up from sleep. Used to take magnesium as migraine prevention and found it helpful. Will order one-time PO migraine cocktail and start magnesium and riboflavin for migraine prophylaxis.     Plan:     Problem List Items Addressed This Visit       Seizure-like activity - Primary    Current Assessment & Plan   - 30 minute EEG  - MRI Brain w/wo contrast (Epilepsy protocol)  - Probable EMU admission discussed with patient  - Continue Onfi for now  - Folic acid 4 mg po daily per ACOG guidelines  - Seizure precautions discussed and provded    RTC in 6 weeks         Relevant Medications    folic acid (FOLVITE) 1 MG tablet    Other Relevant Orders    EEG,w/awake & asleep record    MRI Brain Epilepsy W W/O Contrast    EMU Monitoring    Intractable chronic migraine without aura and with status migrainosus    Current Assessment & Plan   Tension headache     - PO migraine cocktail x 1 - Medrol dose pack, Compazine 10 mg,  Toradol 20 mg, magnesium oxide 800 mg, Zanaflex 2 mg, Depakote 1000 mg   - Riboflavin 400 mg po daily & magnesium glycinate 400 mg po qhs for prophylaxis          Relevant Medications    methylPREDNISolone (MEDROL DOSEPACK) 4 mg tablet    magnesium glycinate 100 mg Tab    riboflavin, vitamin B2, (VITAMIN B-2) 100 mg Tab tablet    divalproex ER (DEPAKOTE ER) 500 MG Tb24 24 hr tablet    magnesium oxide (MAG-OX) 400 mg (241.3 mg magnesium) tablet    ketorolac (TORADOL) 10 mg tablet    prochlorperazine (COMPAZINE) 10 MG tablet    Tension headache    Relevant Medications    tiZANidine (ZANAFLEX) 2 MG tablet       Flaquita Oconnell MD  Neurology PGY-2  [facility]    Subjective:   Patient seen in consultation at the request of Flaquita Oconnell MD for the evaluation of seizure-like activity. A copy of this note will be sent to the referring physician.      HPI:   Ms. Annmarie Ruiz is a 24 y.o. female with PMH of migraines and MIMI presenting for evaluation of seizure-like activity. Started in August 2024, has done MRI and EEG in [facility] in December which were reportedly normal but started on Onfi at the time. Cardiac work-up for syncopal events (type 2 below) reportedly normal as well. Also complaining of daily headaches since events started in August - pressure/squeezing mainly around occiput but has photophobia, phonophobia, and nausea; as well as sensation of being underwater in her ears. Sometimes so severe it wakes her up from sleep. Used to take magnesium as migraine prevention and found it helpful    Event Type 1:   Seizure Description: bilateral (she thinks bilateral) shaking, confused afterwards but does not always lose consciousness  Aura: fatigue, mental fog   Associated Symptoms:  incontinence  Seizure Frequency: variable  Last seizure: 3/1/2025    Event Type 2:   Seizure Description: fainting with LOC  Aura: falling asleep  Associated Symptoms:  none  Seizure Frequency: 8 in 6 weeks  Last seizure:  October 2024    Handedness: right  Seizure Onset Age: 23  Seizure/ Epilepsy Risk Factors: prior head injury  Birth/Developmental History: normal birth history and normal developmental history  Seizure Triggers/ Provoking Features: none known  Current AEDs: Clobazam 5 mg at lunch, 10 mg qhs  Previous Seizure Medications: clobazam (Onfi or Frizium, CLB)  Recent Med Changes: Started clobazam in January (from [place])  Other Treatments: None   Prior Episodes of Status: None  Psychiatric/Behavioral Comorbitidies: MIMI  Surgical Candidacy: TBD    Prior Studies:  EEG : in [place] reportedly normal  vEEG/ EMU evaluation: None  MRI of brain: non-contrasted in [place] reportedly normal  AED levels:  None  CT/CTA Scan:  CT 3/5/2025 normal  PET Scan: None  Neuropsychological Evaluation: None  DEXA Scan: None  Other studies: None     PAST MEDICAL HISTORY:  No past medical history on file.    PAST SURGICAL HISTORY:  Past Surgical History:   Procedure Laterality Date    FUSION, JOINT, WRIST Right     right       CURRENT MEDS:  Current Medications[1]    ALLERGIES:  Review of patient's allergies indicates:   Allergen Reactions    Peanut Rash       FAMILY HISTORY:  No family history on file.    SOCIAL HISTORY:  Social History[2]    Review of Systems:  12 system review of systems is negative except for the symptoms mentioned in HPI.        Objective:     Vitals:    03/17/25 1526   BP: 115/81   BP Location: Left arm   Patient Position: Sitting   Pulse: 60     General: NAD, well nourished   Eyes: no tearing, discharge, no erythema   ENT: moist mucous membranes of the oral cavity, nares patent    Neck: Supple, full range of motion. TTP cervical paraspinal muscles.   Cardiovascular: Warm and well perfused, pulses equal and symmetrical  Lungs: Normal work of breathing, normal chest wall excursions  Skin: No rash, lesions, or breakdown on exposed skin  Psychiatry: Mood and affect are appropriate   Abdomen: soft, non tender, non  distended  Extremeties: No cyanosis, clubbing or edema.    Neurological Exam:  MENTAL STATUS  Level of consciousness: alert  Orientation: oriented to person, place, and time  Attention normal. Concentration normal.  Speech: no dysarthria or aphasia    CRANIAL NERVES  CN II: Visual fields full to confrontation  CN III, IV, VI: PERRL, EOMI  CN V: Decreased sensation to light touch in left V1 distribution   CN VII: Facial expression symmetric and full  CN VIII: Hearing intact to finger rub  CN IX, X: Symmetric palate elevation. Phonation normal  CN XI: Shoulder shrug and head turn intact bilaterally  CN XII: Tongue midline with normal movements, no atrophy    MOTOR EXAM  Muscle bulk: normal  Muscle tone: normal    Strength - Upper Extremities   Arm abduction Elbow flexion Elbow extension Wrist flexion Wrist extension Finger abduction   Right 5/5 5/5 5/5 5/5 5/5 5/5   Left 5/5 5/5 5/5 5/5 5/5 5/5     Strength - Lower Extremities   Hip flexion Knee flexion Knee extension Dorsiflexion Plantarflexion   Right 5/5 5/5 5/5 5/5 5/5   Left 5/5 5/5 5/5 5/5 5/5     REFLEXES   Biceps Triceps Brachioradialis Patellar Achilles   Right 2+ 2+ 2+ 2+ 2+   Left 2+ 2+ 2+ 2+ 2+       SENSORY EXAM  Light touch: decreased in RUE (non-dermatomal distribution)    COORDINATION  Finger to nose: normal  Tremor: none  Gait steady with normal arm swing, base, and turning    LABS  None    IMAGING  3/5/2025 CTH unremarkable    PROCEDURES   None       [1]   Current Outpatient Medications   Medication Sig Dispense Refill    cloBAZam (ONFI) 10 mg Tab Take 10 mg by mouth every evening.      cloBAZam (ONFI) 10 mg Tab Take 5 mg by mouth daily with lunch.      drospirenone-ethinyl estradioL (SD) 3-0.02 mg per tablet Take 1 tablet by mouth once daily.      paroxetine (PAXIL) 20 MG tablet Take 1 tablet (20 mg total) by mouth every morning. 30 tablet 3    divalproex ER (DEPAKOTE ER) 500 MG Tb24 24 hr tablet Take 2 tablets (1,000 mg total) by mouth once. for 1  dose 2 tablet 0    folic acid (FOLVITE) 1 MG tablet Take 4 tablets (4 mg total) by mouth once daily.      ketorolac (TORADOL) 10 mg tablet Take 2 tablets (20 mg total) by mouth once. for 1 dose 2 tablet 0    magnesium glycinate 100 mg Tab Take 400 mg by mouth every evening.      magnesium oxide (MAG-OX) 400 mg (241.3 mg magnesium) tablet Take 2 tablets (800 mg total) by mouth once. for 1 dose 2 tablet 0    methylPREDNISolone (MEDROL DOSEPACK) 4 mg tablet use as directed 21 each 0    prochlorperazine (COMPAZINE) 10 MG tablet Take 1 tablet (10 mg total) by mouth once. for 1 dose 1 tablet 0    riboflavin, vitamin B2, (VITAMIN B-2) 100 mg Tab tablet Take 4 tablets (400 mg total) by mouth once daily.      tiZANidine (ZANAFLEX) 2 MG tablet Take 1 tablet (2 mg total) by mouth every 8 (eight) hours as needed (tension headache, muscle spasm). 21 tablet 0     No current facility-administered medications for this visit.   [2]   Social History  Tobacco Use    Smoking status: Never    Smokeless tobacco: Never   Substance Use Topics    Drug use: Never

## 2025-03-18 ENCOUNTER — PATIENT MESSAGE (OUTPATIENT)
Dept: NEUROLOGY | Facility: CLINIC | Age: 25
End: 2025-03-18
Payer: COMMERCIAL

## 2025-03-19 DIAGNOSIS — R56.9 SEIZURE: Primary | ICD-10-CM

## 2025-03-19 RX ORDER — CLOBAZAM 10 MG/1
10 TABLET ORAL
Qty: 30 EACH | Refills: 0 | Status: SHIPPED | OUTPATIENT
Start: 2025-03-19 | End: 2025-04-18

## 2025-03-19 RX ORDER — CLOBAZAM 10 MG/1
10 TABLET ORAL NIGHTLY
Qty: 30 TABLET | Refills: 0 | Status: SHIPPED | OUTPATIENT
Start: 2025-03-19

## 2025-03-20 ENCOUNTER — TELEPHONE (OUTPATIENT)
Dept: NEUROLOGY | Facility: CLINIC | Age: 25
End: 2025-03-20
Payer: COMMERCIAL

## 2025-03-20 NOTE — TELEPHONE ENCOUNTER
Spoke with patient about EMU and she needs an EEG 1st- scheduled 3/25/25. I agreed to hold 5/12/25 admit date for her until the EEG results are in. Once in I will call her to officially schedule this admission. V/U  
General

## 2025-03-25 ENCOUNTER — HOSPITAL ENCOUNTER (OUTPATIENT)
Dept: NEUROLOGY | Facility: CLINIC | Age: 25
Discharge: HOME | End: 2025-03-25
Payer: COMMERCIAL

## 2025-03-25 DIAGNOSIS — R56.9 SEIZURE-LIKE ACTIVITY: ICD-10-CM

## 2025-03-25 PROCEDURE — 95816 EEG AWAKE AND DROWSY: CPT | Mod: S$GLB,,, | Performed by: PSYCHIATRY & NEUROLOGY

## 2025-03-26 DIAGNOSIS — G43.711 INTRACTABLE CHRONIC MIGRAINE WITHOUT AURA AND WITH STATUS MIGRAINOSUS: Primary | ICD-10-CM

## 2025-03-26 RX ORDER — TOPIRAMATE 25 MG/1
TABLET ORAL
Qty: 60 TABLET | Refills: 6 | Status: SHIPPED | OUTPATIENT
Start: 2025-03-26 | End: 2025-06-01

## 2025-03-26 RX ORDER — SUMATRIPTAN SUCCINATE 50 MG/1
50 TABLET ORAL
Qty: 10 TABLET | Refills: 1 | Status: SHIPPED | OUTPATIENT
Start: 2025-03-26

## 2025-03-26 NOTE — PROCEDURES
Procedures  EEG REPORT      Annmarie Ruiz  [id number]  2000    DATE OF SERVICE: 3/25/2025         METHODOLOGY      Extended electroencephalographic recording is made while the patient is ambulatory and continuing normal daily activities.  Electrodes are placed according to the International 10-20 placement system and included T1 and T2 electrode placement.  Twenty four (24) channels of digital signal (sampling rate of 512/sec) was simultaneously recorded from the scalp including EKG and eye monitors.  Recording band pass was 0.1 to 100 hz and all data was stored digitally on the recorder.  The patient is instructed to press an event button when clinical symptoms occur and write the symptoms into a diary. Activation procedures which include photic stimulation, hyperventilation and instructing patients to perform simple task are done in selected patients.        The EEG is displayed on a monitor screen and can be reformatted into different montages for evaluation.  The entire recoding is submitted for computer assisted analysis to detect spike and electrographic seizure activity.  The entire recording is visually reviewed and the times identified by computer analysis as being spikes or seizures are reviewed again.  Compresses spectral analysis (CSA) is also performed on the activity recorded from each individual channel.  This is displayed as a power display of frequencies from 0 to 30 Hz over time.   The CSA analysis is done and displayed continuously.  This is reviewed for asymmetries in power between homologous areas of the scalp and for presence of changes in power which canbe seen when seizures occur.  Sections of suspected abnormalities on the CSA is then compared with the original EEG recording.  .     DoubleMap software was also utilized in the review of this study.  This software suite analyzes the EEG recording in multiple domains.  Coherence and rhythmicity is computed to identify EEG  sections which may contain organized seizures.  Each channel undergoes analysis to detect presence of spike and sharp waves which have special and morphological characteristic of epileptic activity.  The routine EEG recording is converted from spacial into frequency domain.  This is then displayed comparing homologous areas to identify areas of significant asymmetry.  Algorithm to identify non-cortically generated artifact is used to separate eye movement, EMG and other artifact from the EEG     Recording Times    A total of 00:35:38 hours of EEG was recorded.      EEG FINDINGS:  Background activity:   The background rhythm was characterized by alpha and anterior dominant beta activity with a 10Hz posterior dominant alpha rhythm at 30-70 microvolts.   Symmetry and continuity: the background was continuous and symmetric     Sleep:   No sleep transients although drowsiness noted.    Activation procedures:   Photic stimulation was performed with no abnormalities seen    Abnormal activity:   No epileptiform discharges, periodic discharges, lateralized rhythmic delta activity or electrographic seizures were seen.    IMPRESSION:   Normal EEG of drowsiness and the waking state.      Romaine Barnes MD  Neurology-Epilepsy.  [facility].

## 2025-03-27 ENCOUNTER — TELEPHONE (OUTPATIENT)
Dept: NEUROLOGY | Facility: CLINIC | Age: 25
End: 2025-03-27
Payer: COMMERCIAL

## 2025-03-27 DIAGNOSIS — R56.9 SEIZURE-LIKE ACTIVITY: Primary | ICD-10-CM

## 2025-03-27 NOTE — TELEPHONE ENCOUNTER
Scheduled EMU 5/12/25, 11am. Specifically requested this date. Aware an adult is required to accompany her for the duration including overnight. Aware she will be connected to lines to her head, chest, arm, have an IV placed; will not be able to leave the room until all equipment is removed at discharge. Instructions thoroughly discussed; mailed via [employer]. Denies any special needs.

## 2025-04-01 ENCOUNTER — HOSPITAL ENCOUNTER (EMERGENCY)
Facility: HOSPITAL | Age: 25
Discharge: HOME | End: 2025-04-01
Attending: EMERGENCY MEDICINE
Payer: COMMERCIAL

## 2025-04-01 VITALS
HEART RATE: 69 BPM | HEIGHT: 62 IN | BODY MASS INDEX: 26.13 KG/M2 | SYSTOLIC BLOOD PRESSURE: 97 MMHG | DIASTOLIC BLOOD PRESSURE: 62 MMHG | TEMPERATURE: 98 F | OXYGEN SATURATION: 99 % | RESPIRATION RATE: 14 BRPM | WEIGHT: 142 LBS

## 2025-04-01 DIAGNOSIS — G40.919 BREAKTHROUGH SEIZURE: Primary | ICD-10-CM

## 2025-04-01 DIAGNOSIS — F41.9 ANXIETY: ICD-10-CM

## 2025-04-01 DIAGNOSIS — G43.909 MIGRAINE WITHOUT STATUS MIGRAINOSUS, NOT INTRACTABLE, UNSPECIFIED MIGRAINE TYPE: ICD-10-CM

## 2025-04-01 LAB
ABSOLUTE EOSINOPHIL (OHS): 0.06 K/UL
ABSOLUTE MONOCYTE (OHS): 0.75 K/UL (ref 0.3–1)
ABSOLUTE NEUTROPHIL COUNT (OHS): 7.4 K/UL (ref 1.8–7.7)
ALBUMIN SERPL BCP-MCNC: 3.6 G/DL (ref 3.5–5.2)
ALP SERPL-CCNC: 59 UNIT/L (ref 40–150)
ALT SERPL W/O P-5'-P-CCNC: 11 UNIT/L (ref 10–44)
ANION GAP (OHS): 6 MMOL/L (ref 8–16)
AST SERPL-CCNC: 16 UNIT/L (ref 11–45)
BASOPHILS # BLD AUTO: 0.04 K/UL
BASOPHILS NFR BLD AUTO: 0.4 %
BILIRUB SERPL-MCNC: 0.2 MG/DL (ref 0.1–1)
BUN SERPL-MCNC: 6 MG/DL (ref 6–20)
CALCIUM SERPL-MCNC: 8.2 MG/DL (ref 8.7–10.5)
CHLORIDE SERPL-SCNC: 113 MMOL/L (ref 95–110)
CK SERPL-CCNC: 61 U/L (ref 20–180)
CO2 SERPL-SCNC: 20 MMOL/L (ref 23–29)
CREAT SERPL-MCNC: 0.7 MG/DL (ref 0.5–1.4)
ERYTHROCYTE [DISTWIDTH] IN BLOOD BY AUTOMATED COUNT: 11.6 % (ref 11.5–14.5)
GFR SERPLBLD CREATININE-BSD FMLA CKD-EPI: >60 ML/MIN/1.73/M2
GLUCOSE SERPL-MCNC: 100 MG/DL (ref 70–110)
HCT VFR BLD AUTO: 36.2 % (ref 37–48.5)
HGB BLD-MCNC: 12.5 GM/DL (ref 12–16)
IMM GRANULOCYTES # BLD AUTO: 0.03 K/UL (ref 0–0.04)
IMM GRANULOCYTES NFR BLD AUTO: 0.3 % (ref 0–0.5)
LYMPHOCYTES # BLD AUTO: 1.75 K/UL (ref 1–4.8)
MCH RBC QN AUTO: 29.6 PG (ref 27–31)
MCHC RBC AUTO-ENTMCNC: 34.5 G/DL (ref 32–36)
MCV RBC AUTO: 86 FL (ref 82–98)
NUCLEATED RBC (/100WBC) (OHS): 0 /100 WBC
OHS QRS DURATION: 82 MS
OHS QTC CALCULATION: 456 MS
PLATELET # BLD AUTO: 301 K/UL (ref 150–450)
PMV BLD AUTO: 10.1 FL (ref 9.2–12.9)
POTASSIUM SERPL-SCNC: 3.5 MMOL/L (ref 3.5–5.1)
PROT SERPL-MCNC: 7 GM/DL (ref 6–8.4)
RBC # BLD AUTO: 4.22 M/UL (ref 4–5.4)
RELATIVE EOSINOPHIL (OHS): 0.6 %
RELATIVE LYMPHOCYTE (OHS): 17.4 % (ref 18–48)
RELATIVE MONOCYTE (OHS): 7.5 % (ref 4–15)
RELATIVE NEUTROPHIL (OHS): 73.8 % (ref 38–73)
SODIUM SERPL-SCNC: 139 MMOL/L (ref 136–145)
WBC # BLD AUTO: 10.03 K/UL (ref 3.9–12.7)

## 2025-04-01 PROCEDURE — 63600175 PHARM REV CODE 636 W HCPCS: Mod: JZ,TB | Performed by: NURSE PRACTITIONER

## 2025-04-01 PROCEDURE — 80053 COMPREHEN METABOLIC PANEL: CPT | Performed by: EMERGENCY MEDICINE

## 2025-04-01 PROCEDURE — 99284 EMERGENCY DEPT VISIT MOD MDM: CPT | Mod: 25

## 2025-04-01 PROCEDURE — 85025 COMPLETE CBC W/AUTO DIFF WBC: CPT | Performed by: EMERGENCY MEDICINE

## 2025-04-01 PROCEDURE — 93005 ELECTROCARDIOGRAM TRACING: CPT

## 2025-04-01 PROCEDURE — 93010 ELECTROCARDIOGRAM REPORT: CPT | Mod: ,,, | Performed by: INTERNAL MEDICINE

## 2025-04-01 PROCEDURE — 96375 TX/PRO/DX INJ NEW DRUG ADDON: CPT

## 2025-04-01 PROCEDURE — 82550 ASSAY OF CK (CPK): CPT | Performed by: NURSE PRACTITIONER

## 2025-04-01 PROCEDURE — 25000003 PHARM REV CODE 250: Performed by: NURSE PRACTITIONER

## 2025-04-01 PROCEDURE — 96374 THER/PROPH/DIAG INJ IV PUSH: CPT

## 2025-04-01 RX ORDER — KETOROLAC TROMETHAMINE 30 MG/ML
15 INJECTION, SOLUTION INTRAMUSCULAR; INTRAVENOUS
Status: COMPLETED | OUTPATIENT
Start: 2025-04-01 | End: 2025-04-01

## 2025-04-01 RX ORDER — METOCLOPRAMIDE HYDROCHLORIDE 5 MG/ML
5 INJECTION INTRAMUSCULAR; INTRAVENOUS
Status: COMPLETED | OUTPATIENT
Start: 2025-04-01 | End: 2025-04-01

## 2025-04-01 RX ORDER — DIPHENHYDRAMINE HCL 25 MG
25 CAPSULE ORAL
Status: COMPLETED | OUTPATIENT
Start: 2025-04-01 | End: 2025-04-01

## 2025-04-01 RX ORDER — PROCHLORPERAZINE MALEATE 10 MG
10 TABLET ORAL ONCE
COMMUNITY
Start: 2025-03-17

## 2025-04-01 RX ORDER — KETOROLAC TROMETHAMINE 10 MG/1
TABLET, FILM COATED ORAL
COMMUNITY
Start: 2025-03-17 | End: 2025-04-03

## 2025-04-01 RX ORDER — LANOLIN ALCOHOL/MO/W.PET/CERES
2 CREAM (GRAM) TOPICAL
COMMUNITY
Start: 2025-03-17

## 2025-04-01 RX ADMIN — SODIUM CHLORIDE 1000 ML: 9 INJECTION, SOLUTION INTRAVENOUS at 05:04

## 2025-04-01 RX ADMIN — METOCLOPRAMIDE 5 MG: 5 INJECTION, SOLUTION INTRAMUSCULAR; INTRAVENOUS at 05:04

## 2025-04-01 RX ADMIN — DIPHENHYDRAMINE HYDROCHLORIDE 25 MG: 25 CAPSULE ORAL at 05:04

## 2025-04-01 RX ADMIN — KETOROLAC TROMETHAMINE 15 MG: 30 INJECTION, SOLUTION INTRAMUSCULAR; INTRAVENOUS at 05:04

## 2025-04-01 NOTE — FIRST PROVIDER EVALUATION
"Medical screening examination initiated.  I have conducted a focused provider triage encounter, findings are as follows:    Brief history of present illness:  Patient with ?panic attack vs syncope - concern for tremulousness per EMS     Vitals:    04/01/25 1523   BP: 133/67   Pulse: 93   Resp: (!) 22   Temp: 98.2 °F (36.8 °C)   TempSrc: Oral   SpO2: 98%   Weight: 64.4 kg (141 lb 15.6 oz)   Height: 5' 2" (1.575 m)       Pertinent physical exam:  Awake, alert, oriented, KANG    Brief workup plan:  EKG, labs    Preliminary workup initiated; this workup will be continued and followed by the physician or advanced practice provider that is assigned to the patient when roomed.  "

## 2025-04-01 NOTE — ED TRIAGE NOTES
"Patient is a 24 year old female that presents to the ED via EMS with c/o seizure like activity witnessed while at school. Recently dx with seizures and on medications. Pt reports being compliant with meds. Pt is currently AAOx4, with some c/o headache, photophobia, and "ringing in the ears"   "

## 2025-04-01 NOTE — ED PROVIDER NOTES
"Source of History:  Patient, chart    Chief complaint:  Panic Attack (EMS pt from [facility]. Bystanders reported seizure like activity. C/o tremors, tachycardia. Hx seizures and anxiety. In ED, pt responsive to sternal rub. Pt able to state name & . ) and Seizures      HPI:  Annmarie Ruiz is a 24 y.o. female with medical history of seizures, anxiety, migraine headache presenting with seizure-like activity at school today.  Patient states she had tremors, elevated heart rate earlier today while at school.  Patient states she had her 1st breakthrough seizure approximally 1 month ago and was started on seizure medications (Onfi) and has been compliant with the medications.  Patient states she has been under increased stress recently due to school and finals.    This is the extent to the patients complaints today here in the emergency department.    ROS: As per HPI and below:  Constitutional:  Positive for generalized weakness.  No fever.  No chills.  Eyes: No visual changes.  ENT: No sore throat. No ear pain    Cardiovascular: No chest pain.  Respiratory: No shortness of breath.  GI: No abdominal pain.  No nausea or vomiting.  Genitourinary: No abnormal urination.  Neurologic:  Positive for seizure-like activity.  Positive for headache.  MSK: no back pain.  Integument: No rashes or lesions.  Hematologic: No easy bruising.  Endocrine: No excessive thirst or urination.    Review of patient's allergies indicates:   Allergen Reactions    Peanut Rash       PMH:  As per HPI and below:  History reviewed. No pertinent past medical history.  Past Surgical History:   Procedure Laterality Date    FUSION, JOINT, WRIST Right     right       Social History[1]    Physical Exam:    /80   Pulse 70   Temp 98.2 °F (36.8 °C) (Oral)   Resp 15   Ht 5' 2" (1.575 m)   Wt 64.4 kg (141 lb 15.6 oz)   LMP  (LMP Unknown)   SpO2 100%   Breastfeeding No   BMI 25.97 kg/m²   Nursing note and vital signs " reviewed.  Constitutional: No acute distress.  Nontoxic  Eyes: No conjunctival injection.  Extraocular muscles are intact.  Pupils equal round reactive 3-2 mm.  No nystagmus.  ENT: Oropharynx clear.    Cardiovascular: Regular rate and rhythm.  No murmurs. No gallops. No rubs.  Respiratory: Clear to auscultation bilaterally.  Good air movement.  No wheezes.  No rhonchi. No rales. No accessory muscle use.  Abdomen: Soft.  Not distended.  Nontender.  No guarding.  No rebound. Non-peritoneal.  Musculoskeletal: Good range of motion all joints.  No deformities.  Neck supple.  No meningismus.  Skin: No rashes seen.  Good turgor.  No abrasions.  No ecchymoses.  Neurologic: GCS-15 Cranial nerves 2-12 are intact.  All extremities are 5/5 strength.  Motor and sensory intact.  No ataxia.  Negative Romberg test.  No cerebellar findings.  Good finger-to-nose.  No focal neurological deficits.  Psych:  Appropriate, conversant    MDM    Emergent evaluation of a 25 yo female presenting for seizure-like activity at school today.  Patient states she was recently diagnosed with seizures but has been compliant with all of her medications.  Patient states she recently had an EEG and is scheduled for an MRI on Monday.  Upon arrival to ED patient is postictal and lethargic.  Upon my examination patient awake alert and answering all questions appropriately.  Patient endorsing a headache.  On exam pt is A&Ox3. VSS. Nonfebrile and nontoxic appearing.  Pupils equal round reactive 3-2 mm.  No nystagmus Mucous membranes pink and moist. Breath sounds clear bilaterally.  No rhonchi, rales or wheezing noted on exam.  Abdomen soft and nontender. No rebound or guarding appreciated on exam.   BS WNL.  Pt speaking in full sentences.  Steady gait appreciated. Cap refill < 3 seconds.  GCS 15    History Acquisition   Additional history was acquired from other historians.  Chart, friend  ,  The patient's list of active medical problems, social history,  medications, and allergies as documented per RN staff has been reviewed.     Differential Diagnoses   Based on available information and the initial assessment, the working differential diagnoses considered during this evaluation include but are not limited to stress reaction, seizure, breakthrough seizure, medication noncompliance, anxiety, migraine headache, electrolyte abnormality, dehydration, rhabdomyolysis, others.    I will get labs, hydrate, medicate and reassess.      LABS     Labs Reviewed   COMPREHENSIVE METABOLIC PANEL - Abnormal       Result Value    Sodium 139      Potassium 3.5      Chloride 113 (*)     CO2 20 (*)     Glucose 100      BUN 6      Creatinine 0.7      Calcium 8.2 (*)     Protein Total 7.0      Albumin 3.6      Bilirubin Total 0.2      ALP 59      AST 16      ALT 11      Anion Gap 6 (*)     eGFR >60     CBC WITH DIFFERENTIAL - Abnormal    WBC 10.03      RBC 4.22      HGB 12.5      HCT 36.2 (*)     MCV 86      MCH 29.6      MCHC 34.5      RDW 11.6      Platelet Count 301      MPV 10.1      Nucleated RBC 0      Neut % 73.8 (*)     Lymph % 17.4 (*)     Mono % 7.5      Eos % 0.6      Basophil % 0.4      Imm Grans % 0.3      Neut # 7.40      Lymph # 1.75      Mono # 0.75      Eos # 0.06      Baso # 0.04      Imm Grans # 0.03     CK - Normal    CPK 61     CBC W/ AUTO DIFFERENTIAL    Narrative:     The following orders were created for panel order CBC auto differential.  Procedure                               Abnormality         Status                     ---------                               -----------         ------                     CBC with Differential[[id number]]       Abnormal            Final result                 Please view results for these tests on the individual orders.   URINALYSIS, REFLEX TO URINE CULTURE   POCT URINE PREGNANCY     Additional Consideration   All available testing was considered during the course of this workup.  Comorbidities taken into consideration during  the patient's evaluation and treatment include weight, age.    Social determinants of health were taken into consideration during development of our treatment plan.    Medications   sodium chloride 0.9% bolus 1,000 mL 1,000 mL (1,000 mLs Intravenous New Bag 4/1/25 1746)   metoclopramide injection 5 mg (5 mg Intravenous Given 4/1/25 1745)   diphenhydrAMINE capsule 25 mg (25 mg Oral Given 4/1/25 1746)   ketorolac injection 15 mg (15 mg Intravenous Given 4/1/25 1745)      ED Course as of 04/01/25 1845   Tue Apr 01, 2025   1800 CBC unremarkable.  No leukocytosis noted.  H&H stable at 12.5 and 36.2.  CMP unremarkable CPK negative. [RZ]   1844 Patient reassessed.  Patient states feeling better after IV fluids and medications.  Advised her to follow up with her neurologist.  Advised to continue take her seizure medications as prescribed.  Return precautions discussed.  Patient verbalized understanding of this plan of care.  All questions and concerns addressed [RZ]   1845 Patient is hemodynamically stable, vital signs are normal. Discharge instructions given. Return to ED precautions discussed. Follow up as directed. Pt verbalized understanding of this plan.  Pt is stable for discharge.  [RZ]      ED Course User Index  [RZ] Valerie Paulino NP             CLINICAL IMPRESSION  1. Breakthrough seizure    2. Anxiety    3. Migraine without status migrainosus, not intractable, unspecified migraine type         ED Disposition Condition    Discharge Stable            Instruction:  I see no indication of an emergent process beyond that addressed during our encounter but have duly counseled the patient/family regarding the need for prompt follow-up as well as the indications that should prompt immediate return to the emergency room should new or worrisome developments occur.  The patient/family has been provided with verbal and printed direction regarding our final diagnosis(es) as well as instructions regarding use of OTC and/or  Rx medications intended to manage the patient's aforementioned conditions including:  ED Prescriptions    None       Patient has been advised of following recommended follow-up instructions:  Follow-up Information       Follow up With Specialties Details Why Contact Info    PCP  Schedule an appointment as soon as possible for a visit  As needed     Flaquita Oconnell MD Neurology Go to  as scheduled [address] [facility] [address]  [phone]            The patient/family communicates understanding of all this information and all remaining questions and concerns were addressed at this time.      The patient's condition did not warrant review of the  and prescription of controlled substances.      This note was created using dictation software.  This program may occasionally mistype words and phrases.           [1]   Social History  Tobacco Use    Smoking status: Never    Smokeless tobacco: Never   Substance Use Topics    Drug use: Never        Valerie Paulino NP  04/01/25 [id number]

## 2025-04-01 NOTE — DISCHARGE INSTRUCTIONS
You were seen and evaluated in the ER today.  You likely had a breakthrough seizure today.  We have treated you for your headache.  Please follow up on Monday for your scheduled MRI.  Please discuss your medication with your neurologist  Please follow-up with your PCP as needed.  Please return to the ED for any worsening symptoms such as chest pain, shortness of breath, fever not controlled with Tylenol or ibuprofen or uncontrolled pain.      Our goal in the emergency department is to always give you outstanding care and exceptional service. You may receive a survey by mail or e-mail in the next week regarding your experience in our ED. We would greatly appreciate your completing and returning the survey. Your feedback provides us with a way to recognize our staff who give very good care and it helps us learn how to improve when your experience was below our aspiration of excellence.

## 2025-04-02 ENCOUNTER — ATHLETIC TRAINING SESSION (OUTPATIENT)
Dept: SPORTS MEDICINE | Facility: CLINIC | Age: 25
End: 2025-04-02
Payer: COMMERCIAL

## 2025-04-02 ENCOUNTER — HOSPITAL ENCOUNTER (INPATIENT)
Facility: HOSPITAL | Age: 25
LOS: 3 days | Discharge: HOME | DRG: 880 | End: 2025-04-05
Attending: EMERGENCY MEDICINE | Admitting: HOSPITALIST
Payer: COMMERCIAL

## 2025-04-02 DIAGNOSIS — F44.5 PSYCHOGENIC NONEPILEPTIC SEIZURE: ICD-10-CM

## 2025-04-02 DIAGNOSIS — R56.9 SEIZURE-LIKE ACTIVITY: Primary | ICD-10-CM

## 2025-04-02 DIAGNOSIS — R56.9 SEIZURE: Primary | ICD-10-CM

## 2025-04-02 DIAGNOSIS — G40.919 BREAKTHROUGH SEIZURE: Primary | ICD-10-CM

## 2025-04-02 DIAGNOSIS — G40.919 BREAKTHROUGH SEIZURE: ICD-10-CM

## 2025-04-02 PROBLEM — E87.6 HYPOKALEMIA: Status: ACTIVE | Noted: 2025-04-02

## 2025-04-02 LAB
ABSOLUTE EOSINOPHIL (OHS): 0.14 K/UL
ABSOLUTE MONOCYTE (OHS): 0.49 K/UL (ref 0.3–1)
ABSOLUTE NEUTROPHIL COUNT (OHS): 5.23 K/UL (ref 1.8–7.7)
ALBUMIN SERPL BCP-MCNC: 3.6 G/DL (ref 3.5–5.2)
ALP SERPL-CCNC: 53 UNIT/L (ref 40–150)
ALT SERPL W/O P-5'-P-CCNC: 14 UNIT/L (ref 10–44)
AMPHET UR QL SCN: NEGATIVE
ANION GAP (OHS): 5 MMOL/L (ref 8–16)
AST SERPL-CCNC: 24 UNIT/L (ref 11–45)
B-HCG UR QL: NEGATIVE
BARBITURATE SCN PRESENT UR: NEGATIVE
BASOPHILS # BLD AUTO: 0.05 K/UL
BASOPHILS NFR BLD AUTO: 0.7 %
BENZODIAZ UR QL SCN: ABNORMAL
BILIRUB SERPL-MCNC: 0.3 MG/DL (ref 0.1–1)
BILIRUB UR QL STRIP.AUTO: NEGATIVE
BUN SERPL-MCNC: 5 MG/DL (ref 6–20)
CALCIUM SERPL-MCNC: 8.4 MG/DL (ref 8.7–10.5)
CANNABINOIDS UR QL SCN: NEGATIVE
CHLORIDE SERPL-SCNC: 114 MMOL/L (ref 95–110)
CLARITY UR: CLEAR
CO2 SERPL-SCNC: 20 MMOL/L (ref 23–29)
COCAINE UR QL SCN: NEGATIVE
COLOR UR AUTO: COLORLESS
CREAT SERPL-MCNC: 0.6 MG/DL (ref 0.5–1.4)
CREAT UR-MCNC: 16 MG/DL (ref 15–325)
CTP QC/QA: YES
ERYTHROCYTE [DISTWIDTH] IN BLOOD BY AUTOMATED COUNT: 11.9 % (ref 11.5–14.5)
GFR SERPLBLD CREATININE-BSD FMLA CKD-EPI: >60 ML/MIN/1.73/M2
GLUCOSE SERPL-MCNC: 91 MG/DL (ref 70–110)
GLUCOSE UR QL STRIP: NEGATIVE
HCT VFR BLD AUTO: 39.7 % (ref 37–48.5)
HGB BLD-MCNC: 13.3 GM/DL (ref 12–16)
HGB UR QL STRIP: NEGATIVE
IMM GRANULOCYTES # BLD AUTO: 0.03 K/UL (ref 0–0.04)
IMM GRANULOCYTES NFR BLD AUTO: 0.4 % (ref 0–0.5)
KETONES UR QL STRIP: NEGATIVE
LEUKOCYTE ESTERASE UR QL STRIP: NEGATIVE
LYMPHOCYTES # BLD AUTO: 1.56 K/UL (ref 1–4.8)
MAGNESIUM SERPL-MCNC: 1.9 MG/DL (ref 1.6–2.6)
MCH RBC QN AUTO: 29.3 PG (ref 27–31)
MCHC RBC AUTO-ENTMCNC: 33.5 G/DL (ref 32–36)
MCV RBC AUTO: 87 FL (ref 82–98)
METHADONE UR QL SCN: NEGATIVE
NITRITE UR QL STRIP: NEGATIVE
NUCLEATED RBC (/100WBC) (OHS): 0 /100 WBC
OPIATES UR QL SCN: NEGATIVE
PCP UR QL: NEGATIVE
PH UR STRIP: 7 [PH]
PLATELET # BLD AUTO: 312 K/UL (ref 150–450)
PMV BLD AUTO: 10.5 FL (ref 9.2–12.9)
POTASSIUM SERPL-SCNC: 3.4 MMOL/L (ref 3.5–5.1)
PROT SERPL-MCNC: 7.3 GM/DL (ref 6–8.4)
PROT UR QL STRIP: NEGATIVE
RBC # BLD AUTO: 4.54 M/UL (ref 4–5.4)
RELATIVE EOSINOPHIL (OHS): 1.9 %
RELATIVE LYMPHOCYTE (OHS): 20.8 % (ref 18–48)
RELATIVE MONOCYTE (OHS): 6.5 % (ref 4–15)
RELATIVE NEUTROPHIL (OHS): 69.7 % (ref 38–73)
SODIUM SERPL-SCNC: 139 MMOL/L (ref 136–145)
SP GR UR STRIP: <1.005
UROBILINOGEN UR STRIP-ACNC: NEGATIVE EU/DL
WBC # BLD AUTO: 7.5 K/UL (ref 3.9–12.7)

## 2025-04-02 PROCEDURE — 81025 URINE PREGNANCY TEST: CPT | Performed by: NURSE PRACTITIONER

## 2025-04-02 PROCEDURE — 80201 ASSAY OF TOPIRAMATE: CPT | Performed by: NURSE PRACTITIONER

## 2025-04-02 PROCEDURE — 12000002 HC ACUTE/MED SURGE SEMI-PRIVATE ROOM

## 2025-04-02 PROCEDURE — 80339 ANTIEPILEPTICS NOS 1-3: CPT | Performed by: NURSE PRACTITIONER

## 2025-04-02 PROCEDURE — 83735 ASSAY OF MAGNESIUM: CPT | Performed by: NURSE PRACTITIONER

## 2025-04-02 PROCEDURE — 81003 URINALYSIS AUTO W/O SCOPE: CPT | Performed by: NURSE PRACTITIONER

## 2025-04-02 PROCEDURE — 63600175 PHARM REV CODE 636 W HCPCS: Performed by: HOSPITALIST

## 2025-04-02 PROCEDURE — 85025 COMPLETE CBC W/AUTO DIFF WBC: CPT | Performed by: NURSE PRACTITIONER

## 2025-04-02 PROCEDURE — 25000003 PHARM REV CODE 250: Performed by: HOSPITALIST

## 2025-04-02 PROCEDURE — 80307 DRUG TEST PRSMV CHEM ANLYZR: CPT | Performed by: NURSE PRACTITIONER

## 2025-04-02 PROCEDURE — 99285 EMERGENCY DEPT VISIT HI MDM: CPT

## 2025-04-02 PROCEDURE — 82040 ASSAY OF SERUM ALBUMIN: CPT | Performed by: NURSE PRACTITIONER

## 2025-04-02 RX ORDER — LORAZEPAM 2 MG/ML
2 INJECTION INTRAMUSCULAR
Status: DISCONTINUED | OUTPATIENT
Start: 2025-04-02 | End: 2025-04-03

## 2025-04-02 RX ORDER — POTASSIUM CHLORIDE 20 MEQ/1
40 TABLET, EXTENDED RELEASE ORAL ONCE
Status: COMPLETED | OUTPATIENT
Start: 2025-04-02 | End: 2025-04-02

## 2025-04-02 RX ORDER — CLOBAZAM 10 MG/1
10 TABLET ORAL NIGHTLY
Status: DISCONTINUED | OUTPATIENT
Start: 2025-04-03 | End: 2025-04-04

## 2025-04-02 RX ORDER — TALC
6 POWDER (GRAM) TOPICAL NIGHTLY PRN
Status: DISCONTINUED | OUTPATIENT
Start: 2025-04-02 | End: 2025-04-05 | Stop reason: HOSPADM

## 2025-04-02 RX ORDER — SODIUM CHLORIDE 0.9 % (FLUSH) 0.9 %
10 SYRINGE (ML) INJECTION
Status: DISCONTINUED | OUTPATIENT
Start: 2025-04-02 | End: 2025-04-02

## 2025-04-02 RX ORDER — ONDANSETRON HYDROCHLORIDE 2 MG/ML
4 INJECTION, SOLUTION INTRAVENOUS EVERY 8 HOURS PRN
Status: DISCONTINUED | OUTPATIENT
Start: 2025-04-02 | End: 2025-04-05 | Stop reason: HOSPADM

## 2025-04-02 RX ORDER — PROCHLORPERAZINE EDISYLATE 5 MG/ML
5 INJECTION INTRAMUSCULAR; INTRAVENOUS EVERY 6 HOURS PRN
Status: DISCONTINUED | OUTPATIENT
Start: 2025-04-02 | End: 2025-04-05 | Stop reason: HOSPADM

## 2025-04-02 RX ORDER — ACETAMINOPHEN 325 MG/1
650 TABLET ORAL EVERY 4 HOURS PRN
Status: DISCONTINUED | OUTPATIENT
Start: 2025-04-02 | End: 2025-04-05 | Stop reason: HOSPADM

## 2025-04-02 RX ORDER — GLUCAGON 1 MG
1 KIT INJECTION
Status: DISCONTINUED | OUTPATIENT
Start: 2025-04-02 | End: 2025-04-05 | Stop reason: HOSPADM

## 2025-04-02 RX ORDER — IBUPROFEN 200 MG
24 TABLET ORAL
Status: DISCONTINUED | OUTPATIENT
Start: 2025-04-02 | End: 2025-04-05 | Stop reason: HOSPADM

## 2025-04-02 RX ORDER — IBUPROFEN 200 MG
16 TABLET ORAL
Status: DISCONTINUED | OUTPATIENT
Start: 2025-04-02 | End: 2025-04-05 | Stop reason: HOSPADM

## 2025-04-02 RX ORDER — TOPIRAMATE 25 MG/1
25 TABLET ORAL 2 TIMES DAILY
Status: DISCONTINUED | OUTPATIENT
Start: 2025-04-02 | End: 2025-04-05 | Stop reason: HOSPADM

## 2025-04-02 RX ORDER — NALOXONE HCL 0.4 MG/ML
0.02 VIAL (ML) INJECTION
Status: DISCONTINUED | OUTPATIENT
Start: 2025-04-02 | End: 2025-04-05 | Stop reason: HOSPADM

## 2025-04-02 RX ORDER — SODIUM CHLORIDE 0.9 % (FLUSH) 0.9 %
10 SYRINGE (ML) INJECTION
Status: DISCONTINUED | OUTPATIENT
Start: 2025-04-02 | End: 2025-04-05 | Stop reason: HOSPADM

## 2025-04-02 RX ORDER — SUMATRIPTAN SUCCINATE 50 MG/1
50 TABLET ORAL
Status: DISCONTINUED | OUTPATIENT
Start: 2025-04-03 | End: 2025-04-05 | Stop reason: HOSPADM

## 2025-04-02 RX ADMIN — LORAZEPAM 2 MG: 2 INJECTION INTRAMUSCULAR; INTRAVENOUS at 11:04

## 2025-04-02 RX ADMIN — POTASSIUM CHLORIDE 40 MEQ: 1500 TABLET, EXTENDED RELEASE ORAL at 11:04

## 2025-04-02 RX ADMIN — SODIUM CHLORIDE 1000 ML: 9 INJECTION, SOLUTION INTRAVENOUS at 11:04

## 2025-04-02 NOTE — Clinical Note
Diagnosis: Seizure-like activity [438483]   Reason for IP Medical Treatment  (Clinical interventions that can only be accomplished in the IP setting? ) :: breakthrough seizures

## 2025-04-02 NOTE — ED PROVIDER NOTES
"Source of History:  Patient, friend, chart, EMS    Chief complaint:  Seizures (Patient had two seizures while in physical therapy. Patient currently post ictal. The patient is endorsing no other complaints currently. )      HPI:  Annmarie Ruiz is a 24 y.o. female with medical history of seizures, anxiety, migraine headaches presenting with seizure-like activity today while at physical therapy.  As per notes patient had 2 breakthrough seizures today and each lasted approximately 3 minutes.  Patient did not not bite her tongue or have loss of bowel or bladder.  Patient was able to maintain her airway during the episodes.  Since that time patient has been sleepy and postictal.  Patient does endorse decreased appetite, decreased eating, feeling guilty when she eats, increased stress and decreased sleeping.    Of note patient was seen yesterday for same complaint.  Patient had been compliant with her Onfi and is scheduled for an MRI on Monday.    This is the extent to the patients complaints today here in the emergency department.    ROS: As per HPI and below:  Constitutional: No fever.  No chills.  Eyes: No visual changes.  ENT: No sore throat. No ear pain    Cardiovascular: No chest pain.  Respiratory: No shortness of breath.  GI: No abdominal pain.  No nausea or vomiting.  Genitourinary: No abnormal urination.  Neurologic:  Positive for seizure-like activity.  MSK: no back pain.  Integument: No rashes or lesions.  Hematologic: No easy bruising.  Endocrine: No excessive thirst or urination.    Review of patient's allergies indicates:   Allergen Reactions    Peanut Rash       PMH:  As per HPI and below:  No past medical history on file.  Past Surgical History:   Procedure Laterality Date    FUSION, JOINT, WRIST Right     right       Social History[1]    Physical Exam:    /75 (Patient Position: Lying)   Pulse 70   Temp 97.9 °F (36.6 °C) (Oral)   Resp 16   Ht 5' 2" (1.575 m)   Wt 64 kg (141 lb)   LMP  " (LMP Unknown)   SpO2 100%   BMI 25.79 kg/m²   Nursing note and vital signs reviewed.  Constitutional: No acute distress.  Nontoxic.  Sleepy but arousable.  Eyes: No conjunctival injection.  Extraocular muscles are intact.  Pupils equal round reactive 3-2 mm.  No nystagmus.  ENT: Oropharynx clear.    Cardiovascular: Regular rate and rhythm.  No murmurs. No gallops. No rubs.  Respiratory: Clear to auscultation bilaterally.  Good air movement.  No wheezes.  No rhonchi. No rales. No accessory muscle use.  Abdomen: Soft.  Not distended.  Nontender.  No guarding.  No rebound. Non-peritoneal.  Musculoskeletal: Good range of motion all joints.  No deformities.  Neck supple.  No meningismus.  Skin: No rashes seen.  Good turgor.  No abrasions.  No ecchymoses.  Neurologic:  Postictal on initial exam.  Psych:  Appropriate, conversant    MDM    Emergent evaluation of a 25 yo female presenting for breakthrough seizures.  Patient had 2 breakthrough seizures today while at physical therapy at school.  Both episodes lasted approximately 3 minutes with tonic-clonic movements.  No loss of bowel or bladder.  Patient did not bite her tongue.  Patient able to maintain her airway.  On exam pt is sleepy but arousable.  Postictal during exam.  VSS. Nonfebrile and nontoxic appearing.  Mucous membranes pink and moist. Breath sounds clear bilaterally.  Abdomen soft and nontender. No rebound or guarding appreciated on exam.   BS WNL.      History Acquisition   Additional history was acquired from other historians.  Friend, chart, EMS    The patient's list of active medical problems, social history, medications, and allergies as documented per RN staff has been reviewed.     Differential Diagnoses   Based on available information and the initial assessment, the working differential diagnoses considered during this evaluation include but are not limited to breakthrough seizure, medication noncompliance, dehydration, electrolyte abnormalities,  "functional seizures, pseudoseizures, anxiety, others.    I will get labs, consult Neurology and reassess.  I discussed this case with my supervising physician.      LABS     Labs Reviewed   COMPREHENSIVE METABOLIC PANEL - Abnormal       Result Value    Sodium 139      Potassium 3.4 (*)     Chloride 114 (*)     CO2 20 (*)     Glucose 91      BUN 5 (*)     Creatinine 0.6      Calcium 8.4 (*)     Protein Total 7.3      Albumin 3.6      Bilirubin Total 0.3      ALP 53      AST 24      ALT 14      Anion Gap 5 (*)     eGFR >60     URINALYSIS, REFLEX TO URINE CULTURE - Abnormal    Color, UA Colorless (*)     Appearance, UA Clear      pH, UA 7.0      Spec Grav UA <1.005 (*)     Protein, UA Negative      Glucose, UA Negative      Ketones, UA Negative      Bilirubin, UA Negative      Blood, UA Negative      Nitrites, UA Negative      Urobilinogen, UA Negative      Leukocyte Esterase, UA Negative     DRUG SCREEN PANEL, URINE EMERGENCY - Abnormal    Benzodiazepine, Urine Presumptive Positive (*)     Methadone, Urine Negative      Cocaine, Urine Negative      Opiates, Urine Negative      Barbiturates, Urine Negative      Amphetamines, Urine Negative      THC Negative      Phencyclidine, Urine Negative      Urine Creatinine 16.0      Narrative:     This screen includes the following classes of drugs at the listed cut-off:     Benzodiazepines:        200 ng/ml   Methadone:              300 ng/ml   Cocaine metabolite:     300 ng/ml   Opiates:                300 ng/ml   Barbiturates:           200 ng/ml   Amphetamines:           1000 ng/ml   Marijuana metabs (THC): 50 ng/ml   Phencyclidine (PCP):    25 ng/ml     This is a screening test. If results do not correlate with clinical presentation, then a confirmatory send out test is advised.    This report is intended for use in clinical monitoring and management of patients. It is not intended for use in employment related drug testing."   MAGNESIUM - Normal    Magnesium  1.9     CBC " WITH DIFFERENTIAL - Normal    WBC 7.50      RBC 4.54      HGB 13.3      HCT 39.7      MCV 87      MCH 29.3      MCHC 33.5      RDW 11.9      Platelet Count 312      MPV 10.5      Nucleated RBC 0      Neut % 69.7      Lymph % 20.8      Mono % 6.5      Eos % 1.9      Basophil % 0.7      Imm Grans % 0.4      Neut # 5.23      Lymph # 1.56      Mono # 0.49      Eos # 0.14      Baso # 0.05      Imm Grans # 0.03     CBC W/ AUTO DIFFERENTIAL    Narrative:     The following orders were created for panel order CBC auto differential.  Procedure                               Abnormality         Status                     ---------                               -----------         ------                     CBC with Differential[[id number]]       Normal              Final result                 Please view results for these tests on the individual orders.   CLOBAZAM (ONFI)   TOPIRAMATE LEVEL   POCT URINE PREGNANCY    POC Preg Test, Ur Negative       Acceptable Yes         Additional Consideration   All available testing was considered during the course of this workup.  Comorbidities taken into consideration during the patient's evaluation and treatment include weight, age.    Social determinants of health were taken into consideration during development of our treatment plan.    Medications - No data to display   ED Course as of 04/02/25 2039 Wed Apr 02, 2025 1749 Discussed case with Neurology resident who spoke to patient's neurologist.  High suspicion for functional disorder for this patient.  They do not recommend medication change or dosage increase at this time.  Recommend admitting to Hospital Medicine for long-term EEG.  Will get labs and consult hospital medicine for admission. [RZ]   2000 CBC unremarkable.  No leukocytosis noted.  H&H stable at 13.3 and 39.7.  CMP unremarkable.  UA negative for any acute infectious process.  Urine preg negative.  Mag within normal limits. [RZ]   2015 Discussed case  with hospital medicine.  Will admit for further evaluation treatment of breakthrough seizures.  Awaiting bed assignment. [RZ]      ED Course User Index  [RZ] Valerie Paulino NP            Attending Attestation:     Physician Attestation Statement for NP/PA:   I personally made/approved the management plan and take responsibility for the patient management.    Other NP/PA Attestation Additions:    History of Present Illness: Seizure               CLINICAL IMPRESSION  1. Seizure-like activity    2. Breakthrough seizure         ED Disposition Condition    Admit Stable          This note was created using dictation software.  This program may occasionally mistype words and phrases.         Valerie Paulino NP  04/02/25 2016         [1]   Social History  Tobacco Use    Smoking status: Never    Smokeless tobacco: Never   Substance Use Topics    Drug use: Never        Juve Nolasco III, MD  04/02/25 1267

## 2025-04-02 NOTE — PROGRESS NOTES
Athletic Training Room Note 04/02/2025  [facility]    : Kaay    CC: seizure    HPI:   Patient was in the training room, after her appointment today when she started to seize, tonic-clonic. This started at 4:15 pm and lasted 3 minutes. Pulse ox was 99% at time of seizure. She was laying down when it started and did not hit her head. When she came out of it, she was post-ictal and minimal responsive but was able to try and squeeze my hand to follow commands.     Of note, she had an additional seizure at 4:27 pm, lasting 3 minutes. Pulse ox was > 95% at time of seizure. Seizure was again tonic clonic.     EMS was called and helped transport patient to hospital.    Physical Exam:  See above    Assessment:  1. Breakthrough seizure      Plan:   EMS to take to hospital, ideally neurology consult to help optimize medication dosage with this being her second breakthrough seizure in 2 days. Will continue to follow-up.

## 2025-04-02 NOTE — PROGRESS NOTES
Reason for Encounter N/A    Objective:     Ramya reported to [facility] on 04/01/2025 between 12:00pm and 12:10pm c/o headache and jaw being tight/locked closed. She reported this information to John at the time, and he informed her to make sure she go eat lunch and take her medicine.     After eating, Ramya came back into the training room around 1pm and reported to me that she had eaten, taken her meds, but still feels tired with a slight headache; also stating that she had a meeting with Dr. Cowan for 2pm. Ramya laid down on, elevated her legs, and put an ice bag on her head. At 1:55pm Ramya got up to go to her meeting, but came back to lay back down stating that she's starting to feel really bad.     A blood pressure cuff and pulse oximeter was applied to her. BP was 151/111 and pulse ox reading was 97% 99 hear rate. Ramya started to have convulsions/seizure like movement, so I instructed John to call Dr. Luna before calling 911. The seizure lasted for about 5 minutes before it stopped. Afterwards, I was able to speak to Ramya to make sure she was alert. She answered and stated she was really tired.     When EMS arrived, she was able to answer when her name was called and she was aware that she was at school in the [employer]. When she was loaded onto the gurney, I noticed her jaw was locked open for about 1min, then she started to cry stating she was scared.      Assessment:     Status: O - Out    Date Seen:  04/01/2025    Date of Injury:  n/a    Date Out:  n/a    Date Cleared:  n/a    Plan:     1. Attend upcoming doctor appointments  2. Physician Referral: yes  3. ED Referral:yes  4. Parent/Guardian Notified: No  5. All questions were answered, ath. will contact me for questions or concerns in the interim.  6.         Eligible to use School Insurance: Yes

## 2025-04-02 NOTE — PROGRESS NOTES
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 She reports an increase in stress, lack of appetite leading to a decrease in eating and guilt when she does eat. She reports on the day of her seizure like episode she was weak, did not eat much and had not taken her medicine.       Physical Exam:  Neuro: alert & oriented  Psychiatric: mood stable, appropriate for age    Assessment:  1. Seizure      Plan:   Discussed next steps in her management, advised the goal is to make it to graduation in one month, will work to manage stress, which is likely contributing to her breakthrough seizures. Agree with continuation of weekly psychology and psychiatry appointments with her team back home and continuing with neurology, to work on medication optimization while she is still here. Also discussed the importance of appropriately fueling her body both with food and hydration, to limit risk of breakthrough seizure. Advised different light, but appropriate food options to help with the body dysmorphia aspect of her lack of desire to eat.

## 2025-04-03 ENCOUNTER — DOCUMENTATION ONLY (OUTPATIENT)
Dept: NEUROLOGY | Facility: CLINIC | Age: 25
End: 2025-04-03
Payer: COMMERCIAL

## 2025-04-03 LAB
ABSOLUTE EOSINOPHIL (OHS): 0.31 K/UL
ABSOLUTE MONOCYTE (OHS): 0.58 K/UL (ref 0.3–1)
ABSOLUTE NEUTROPHIL COUNT (OHS): 4.38 K/UL (ref 1.8–7.7)
ALBUMIN SERPL BCP-MCNC: 2.9 G/DL (ref 3.5–5.2)
ALP SERPL-CCNC: 43 UNIT/L (ref 40–150)
ALT SERPL W/O P-5'-P-CCNC: 8 UNIT/L (ref 10–44)
ANION GAP (OHS): 4 MMOL/L (ref 8–16)
AST SERPL-CCNC: 17 UNIT/L (ref 11–45)
BASOPHILS # BLD AUTO: 0.05 K/UL
BASOPHILS NFR BLD AUTO: 0.7 %
BILIRUB SERPL-MCNC: 0.2 MG/DL (ref 0.1–1)
BUN SERPL-MCNC: 5 MG/DL (ref 6–20)
CALCIUM SERPL-MCNC: 8.2 MG/DL (ref 8.7–10.5)
CHLORIDE SERPL-SCNC: 116 MMOL/L (ref 95–110)
CO2 SERPL-SCNC: 20 MMOL/L (ref 23–29)
CREAT SERPL-MCNC: 0.6 MG/DL (ref 0.5–1.4)
ERYTHROCYTE [DISTWIDTH] IN BLOOD BY AUTOMATED COUNT: 11.9 % (ref 11.5–14.5)
GFR SERPLBLD CREATININE-BSD FMLA CKD-EPI: >60 ML/MIN/1.73/M2
GLUCOSE SERPL-MCNC: 107 MG/DL (ref 70–110)
HCT VFR BLD AUTO: 33.3 % (ref 37–48.5)
HGB BLD-MCNC: 11.2 GM/DL (ref 12–16)
IMM GRANULOCYTES # BLD AUTO: 0.03 K/UL (ref 0–0.04)
IMM GRANULOCYTES NFR BLD AUTO: 0.4 % (ref 0–0.5)
LYMPHOCYTES # BLD AUTO: 2.17 K/UL (ref 1–4.8)
MCH RBC QN AUTO: 29.6 PG (ref 27–31)
MCHC RBC AUTO-ENTMCNC: 33.6 G/DL (ref 32–36)
MCV RBC AUTO: 88 FL (ref 82–98)
NUCLEATED RBC (/100WBC) (OHS): 0 /100 WBC
OHS QRS DURATION: 82 MS
OHS QTC CALCULATION: 407 MS
PLATELET # BLD AUTO: 244 K/UL (ref 150–450)
PMV BLD AUTO: 9.9 FL (ref 9.2–12.9)
POTASSIUM SERPL-SCNC: 3.8 MMOL/L (ref 3.5–5.1)
PROT SERPL-MCNC: 5.8 GM/DL (ref 6–8.4)
RBC # BLD AUTO: 3.78 M/UL (ref 4–5.4)
RELATIVE EOSINOPHIL (OHS): 4.1 %
RELATIVE LYMPHOCYTE (OHS): 28.9 % (ref 18–48)
RELATIVE MONOCYTE (OHS): 7.7 % (ref 4–15)
RELATIVE NEUTROPHIL (OHS): 58.2 % (ref 38–73)
SODIUM SERPL-SCNC: 140 MMOL/L (ref 136–145)
WBC # BLD AUTO: 7.52 K/UL (ref 3.9–12.7)

## 2025-04-03 PROCEDURE — 21400001 HC TELEMETRY ROOM

## 2025-04-03 PROCEDURE — 84450 TRANSFERASE (AST) (SGOT): CPT | Performed by: HOSPITALIST

## 2025-04-03 PROCEDURE — 11000001 HC ACUTE MED/SURG PRIVATE ROOM

## 2025-04-03 PROCEDURE — 63600175 PHARM REV CODE 636 W HCPCS

## 2025-04-03 PROCEDURE — 99223 1ST HOSP IP/OBS HIGH 75: CPT | Mod: ,,, | Performed by: PSYCHIATRY & NEUROLOGY

## 2025-04-03 PROCEDURE — 63600175 PHARM REV CODE 636 W HCPCS: Performed by: HOSPITALIST

## 2025-04-03 PROCEDURE — 85025 COMPLETE CBC W/AUTO DIFF WBC: CPT | Performed by: HOSPITALIST

## 2025-04-03 PROCEDURE — 95714 VEEG EA 12-26 HR UNMNTR: CPT

## 2025-04-03 PROCEDURE — 95700 EEG CONT REC W/VID EEG TECH: CPT

## 2025-04-03 PROCEDURE — 95720 EEG PHY/QHP EA INCR W/VEEG: CPT | Mod: ,,, | Performed by: PSYCHIATRY & NEUROLOGY

## 2025-04-03 PROCEDURE — 25000003 PHARM REV CODE 250: Performed by: HOSPITALIST

## 2025-04-03 RX ORDER — METHOCARBAMOL 750 MG/1
750 TABLET, FILM COATED ORAL EVERY 6 HOURS PRN
Status: DISCONTINUED | OUTPATIENT
Start: 2025-04-03 | End: 2025-04-04

## 2025-04-03 RX ORDER — KETOROLAC TROMETHAMINE 30 MG/ML
15 INJECTION, SOLUTION INTRAMUSCULAR; INTRAVENOUS ONCE
Status: DISCONTINUED | OUTPATIENT
Start: 2025-04-03 | End: 2025-04-05

## 2025-04-03 RX ORDER — ALPRAZOLAM 0.5 MG/1
0.5 TABLET, EXTENDED RELEASE ORAL NIGHTLY
Status: ON HOLD | COMMUNITY
End: 2025-04-05 | Stop reason: HOSPADM

## 2025-04-03 RX ORDER — LORAZEPAM 2 MG/ML
INJECTION INTRAMUSCULAR
Status: COMPLETED
Start: 2025-04-03 | End: 2025-04-03

## 2025-04-03 RX ADMIN — LORAZEPAM 2 MG: 2 INJECTION INTRAMUSCULAR; INTRAVENOUS at 04:04

## 2025-04-03 RX ADMIN — SUMATRIPTAN SUCCINATE 50 MG: 50 TABLET ORAL at 10:04

## 2025-04-03 RX ADMIN — TOPIRAMATE 25 MG: 25 TABLET, FILM COATED ORAL at 10:04

## 2025-04-03 RX ADMIN — TOPIRAMATE 25 MG: 25 TABLET, FILM COATED ORAL at 04:04

## 2025-04-03 RX ADMIN — CLOBAZAM 10 MG: 10 TABLET ORAL at 10:04

## 2025-04-03 RX ADMIN — LORAZEPAM 2 MG: 2 INJECTION INTRAMUSCULAR; INTRAVENOUS at 08:04

## 2025-04-03 RX ADMIN — LORAZEPAM 2 MG: 2 INJECTION INTRAMUSCULAR at 04:04

## 2025-04-03 NOTE — H&P
[facility] - Emergency Dept  [facility]  History & Physical    Patient Name: Annmarie Ruiz  MRN: [id number]  Patient Class: IP- Inpatient  Admission Date: 4/2/2025  Attending Physician: Juve Nolasco III, MD   Primary Care Provider: Nusrat, Primary Doctor         Patient information was obtained from patient and ER records.     Subjective:     Principal Problem:Seizure-like activity    Chief Complaint:   Chief Complaint   Patient presents with    Seizures     Patient had two seizures while in physical therapy. Patient currently post ictal. The patient is endorsing no other complaints currently.         HPI: 25 yo F with PMHx recently dx'd seizure disorder, migraines and MIMI presenting for evaluation of seizure-like activity. Pt. Reportedly had 2 seizures described as tonic clonic with full body convulsions and LOC. Episodes lasted about 3 mins, it is unclear if there was post-ictal period. Pt. Was also seen in ED yesterday after having a similar episode and per report she arrived post-ictal/lethargic at that time. She has not had any observed seizure-like activity while in ED during current visit or yesterday's visit. At time of my interview, pt. Resting comfortably and states she feels back to normal. She reports compliance with her home Onfi.    Per chart review pt. Had first seizure-like activity in August 2024, had done MRI and EEG in [facility] in December which were reportedly normal but started on Onfi at the time. Cardiac work-up for syncopal eventsreportedly normal as well. She has not yet had MRI/EEG in system. CT head was normal earlier this month.    No past medical history on file.    Past Surgical History:   Procedure Laterality Date    FUSION, JOINT, WRIST Right     right       Review of patient's allergies indicates:   Allergen Reactions    Peanut Rash       No current facility-administered medications on file prior to encounter.     Current Outpatient Medications on File Prior to Encounter    Medication Sig    cloBAZam (ONFI) 10 mg Tab Take 1 each (10 mg total) by mouth daily with lunch.    cloBAZam (ONFI) 10 mg Tab Take 1 each (10 mg total) by mouth every evening.    divalproex ER (DEPAKOTE ER) 500 MG Tb24 24 hr tablet Take 2 tablets (1,000 mg total) by mouth once. for 1 dose    drospirenone-ethinyl estradioL (SD) 3-0.02 mg per tablet Take 1 tablet by mouth once daily.    folic acid (FOLVITE) 1 MG tablet Take 4 tablets (4 mg total) by mouth once daily.    ketorolac (TORADOL) 10 mg tablet Take by mouth.    magnesium glycinate 100 mg Tab Take 400 mg by mouth every evening.    magnesium oxide (MAG-OX) 400 mg (241.3 mg magnesium) tablet Take 2 tablets by mouth.    methylPREDNISolone (MEDROL DOSEPACK) 4 mg tablet use as directed    paroxetine (PAXIL) 20 MG tablet Take 1 tablet (20 mg total) by mouth every morning.    prochlorperazine (COMPAZINE) 10 MG tablet Take 10 mg by mouth once.    riboflavin, vitamin B2, (VITAMIN B-2) 100 mg Tab tablet Take 4 tablets (400 mg total) by mouth once daily.    sumatriptan (IMITREX) 50 MG tablet Take 1 tablet (50 mg total) by mouth as needed for Migraine. Take 1 tablet (50 mg total) by mouth as needed at the start of the migraine. You can take another pill 2 hours after the first one if you still have a headache. DO NOT TAKE MORE THAN TWO PILLS IN A 24 HOUR PERIOD.    tiZANidine (ZANAFLEX) 2 MG tablet Take 1 tablet (2 mg total) by mouth every 8 (eight) hours as needed (tension headache, muscle spasm).    topiramate (TOPAMAX) 25 MG tablet Take 1 tablet (25 mg total) by mouth once daily for 7 days, THEN 1 tablet (25 mg total) 2 (two) times daily.     Family History    None       Tobacco Use    Smoking status: Never    Smokeless tobacco: Never   Substance and Sexual Activity    Alcohol use: Not on file    Drug use: Never    Sexual activity: Yes     Review of Systems   Constitutional:  Negative for activity change, appetite change, chills, fever and unexpected weight change.    HENT:  Negative for congestion and sore throat.    Respiratory:  Negative for cough and shortness of breath.    Cardiovascular:  Negative for chest pain, palpitations and leg swelling.   Gastrointestinal:  Negative for abdominal distention, abdominal pain, blood in stool, constipation, diarrhea, nausea and vomiting.   Genitourinary:  Negative for difficulty urinating, dysuria and hematuria.   Musculoskeletal:  Negative for arthralgias and myalgias.   Skin:  Negative for color change and rash.   Neurological:  Positive for seizures. Negative for dizziness and tremors.     Objective:     Vital Signs (Most Recent):  Temp: 97.9 °F (36.6 °C) (04/02/25 1902)  Pulse: 70 (04/02/25 1902)  Resp: 16 (04/02/25 1902)  BP: 118/75 (04/02/25 1902)  SpO2: 100 % (04/02/25 1902) Vital Signs (24h Range):  Temp:  [97.9 °F (36.6 °C)-98.4 °F (36.9 °C)] 97.9 °F (36.6 °C)  Pulse:  [70-86] 70  Resp:  [16-22] 16  SpO2:  [99 %-100 %] 100 %  BP: (116-118)/(74-75) 118/75     Weight: 64 kg (141 lb)  Body mass index is 25.79 kg/m².     Physical Exam  Vitals reviewed.   Constitutional:       General: She is not in acute distress.     Appearance: She is well-developed.   HENT:      Head: Normocephalic and atraumatic.   Eyes:      Extraocular Movements: Extraocular movements intact.      Pupils: Pupils are equal, round, and reactive to light.   Neck:      Vascular: No JVD.      Trachea: No tracheal deviation.   Cardiovascular:      Rate and Rhythm: Normal rate and regular rhythm.      Heart sounds: No murmur heard.     No friction rub. No gallop.   Pulmonary:      Effort: No respiratory distress.      Breath sounds: Normal breath sounds. No wheezing or rales.   Abdominal:      General: Bowel sounds are normal. There is no distension.      Palpations: Abdomen is soft. There is no mass.      Tenderness: There is no abdominal tenderness.   Musculoskeletal:         General: No deformity.      Cervical back: Neck supple.   Lymphadenopathy:       Cervical: No cervical adenopathy.   Skin:     General: Skin is warm and dry.      Findings: No rash.   Neurological:      Mental Status: She is alert and oriented to person, place, and time.              CRANIAL NERVES     CN III, IV, VI   Pupils are equal, round, and reactive to light.       Significant Labs: All pertinent labs within the past 24 hours have been reviewed.    Significant Imaging: I have reviewed all pertinent imaging results/findings within the past 24 hours.  Assessment/Plan:     Assessment & Plan  Seizure-like activity  -Pt. With multiple episodes of seizure like activity. Case discussed with neurology. Unclear at this point if seizures are epliptic in natrue.  They do not recommend medication change or dosage increase at this time. Recommend admitting to Hospital Medicine for long-term EEG  -Continue onfi  -cEEG ordered, seizure precations, ativan PRN for seizure >5 mins  Hypokalemia  -PO K+ ordered, repeat in morning and replace PRN  VTE Risk Mitigation (From admission, onward)           Ordered     IP VTE LOW RISK PATIENT  Once         04/02/25 2228     Place sequential compression device  Until discontinued         04/02/25 2228                                    Vasu Mosqueda MD  Department of Hospital Medicine  [facility] - Emergency Dept

## 2025-04-03 NOTE — ASSESSMENT & PLAN NOTE
Ms. Annmarie Ruiz is a 24 y.o. female with PMH of migraines and MIMI presenting for evaluation of seizure-like activity. Started in August 2024, has done MRI and EEG in [facility] in December which were reportedly normal but started on Onfi at the time. Cardiac work-up for syncopal events (event type 2 below) reportedly normal as well.  Patient has seen Dr. Oconnell as an outpatient and plan was to obtain repeat MRI and EEG along with EMU admission.   Patient completed EEG which was normal. MRI has not yet been completed. She has been to the ED 2 times over the past 2 days for breakthrough events. She states that she did have a possible GI illness over the past weekend with some nausea, vomiting, and diarrhea and was possibly dehydrated. She has no significant findings on neurological exam.    Recommendations:  - Continuous vEEG for episode capture  - Avoid use of ativan unless clinical seizure confirmed on EEG  - Seizure precautions  - Fall precautions, side rail padding in place  - Telemetry

## 2025-04-03 NOTE — HPI
25 yo F with PMHx recently dx'd seizure disorder, migraines and MIMI presenting for evaluation of seizure-like activity. Pt. Reportedly had 2 seizures described as tonic clonic with full body convulsions and LOC. Episodes lasted about 3 mins, it is unclear if there was post-ictal period. Pt. Was also seen in ED yesterday after having a similar episode and per report she arrived post-ictal/lethargic at that time. She has not had any observed seizure-like activity while in ED during current visit or yesterday's visit. At time of my interview, pt. Resting comfortably and states she feels back to normal. She reports compliance with her home Onfi.    Per chart review pt. Had first seizure-like activity in August 2024, had done MRI and EEG in [facility] in December which were reportedly normal but started on Onfi at the time. Cardiac work-up for syncopal eventsreportedly normal as well. She has not yet had MRI/EEG in system. CT head was normal earlier this month.

## 2025-04-03 NOTE — ED NOTES
Assumed care of the patient. Report received from PARDEEP Cuadra. Pt on continuous cardiac monitoring, continuous pulse oximetry, and automatic BP cuff cycling Q30 min. Pt in hospital gown, side rails up X2, bed low and locked, and call light is placed within reach. No family/visitors at bedside at this time. Pt denies any complaints or needs. Whiteboard updated with pt's plan of care.

## 2025-04-03 NOTE — ED NOTES
Requested Toradol and PRN Methocarbamol for patient from pharmacy, unable to pull medications from ED pyxis. Awaiting medication from Pharmacy.

## 2025-04-03 NOTE — PHARMACY MED REC
"      Admission Medication History     The home medication history was taken by Cathryn Laurent.    You may go to "Admission" then "Reconcile Home Medications" tabs to review and/or act upon these items.     The home medication list has been updated by the Pharmacy department.   Please read ALL comments highlighted in yellow.   Please address this information as you see fit.    Feel free to contact us if you have any questions or require assistance.      The medications listed below were removed from the home medication list. Please reorder if appropriate:  Patient reports no longer taking the following medication(s):  Depakote   Toradol   Magnesium   Medrol dose pack     Medications listed below were obtained from: Patient/family and Analytic software- [employer]  .  Current Outpatient Medications on File Prior to Encounter   Medication Sig    ALPRAZolam (XANAX XR) 0.5 MG Tb24 Take Half of tablet of 0.5 mg by mouth nightly.       cloBAZam (ONFI) 10 mg Tab Take 1 each (10 mg total) by mouth every evening.      drospirenone-ethinyl estradioL (SD) 3-0.02 mg per tablet Take 1 tablet by mouth once daily.      folic acid (FOLVITE) 1 MG tablet Take 4 tablets (4 mg total) by mouth once daily.      magnesium oxide (MAG-OX) 400 mg (241.3 mg magnesium) tablet Take 2 tablets by mouth daily       paroxetine (PAXIL) 20 MG tablet Take 1 tablet (20 mg total) by mouth every morning.      prochlorperazine (COMPAZINE) 10 MG tablet Take 10 mg by mouth once daily       riboflavin, vitamin B2, (VITAMIN B-2) 100 mg Tab tablet Take 4 tablets (400 mg total) by mouth once daily.      sumatriptan (IMITREX) 50 MG tablet Take 1 tablet (50 mg total) by mouth as needed for Migraine. Take 1 tablet (50 mg total) by mouth as needed at the start of the migraine. You can take another pill 2 hours after the first one if you still have a headache.   DO NOT TAKE MORE THAN TWO PILLS IN A 24 HOUR PERIOD.      tiZANidine (ZANAFLEX) 2 MG tablet Take 1 tablet (2 " mg total) by mouth every 8 (eight) hours as needed  for tension headache, muscle spasm.      topiramate (TOPAMAX) 25 MG tablet Take 1 tablet (25 mg total) by mouth once daily for 7 days, THEN 1 tablet (25 mg total) 2 (two) times daily.         Cathryn Laurent  [id number]            .

## 2025-04-03 NOTE — PROCEDURES
Prelim EEG 15:24 - 17:35   Normal EEG during wakefulness drowsiness and sleep.  An episode of eyebrow twitching, lead jerking, upper body jerking and twitching was noted without EEG correlate.  Dr. Sevilla

## 2025-04-03 NOTE — ED NOTES
Assumed care of patient Annmarie Ruiz, a 24 y.o. female     Triage note:  Chief Complaint   Patient presents with    Seizures     Patient had two seizures while in physical therapy. Patient currently post ictal. The patient is endorsing no other complaints currently.

## 2025-04-03 NOTE — SUBJECTIVE & OBJECTIVE
Past Medical History:   Diagnosis Date    Seizures        Past Surgical History:   Procedure Laterality Date    FUSION, JOINT, WRIST Right     right       Review of patient's allergies indicates:   Allergen Reactions    Peanut Rash           No current facility-administered medications on file prior to encounter.     Current Outpatient Medications on File Prior to Encounter   Medication Sig    ALPRAZolam (XANAX XR) 0.5 MG Tb24 Take 0.5 mg by mouth nightly. Half of tablet    cloBAZam (ONFI) 10 mg Tab Take 1 each (10 mg total) by mouth every evening.    drospirenone-ethinyl estradioL (SD) 3-0.02 mg per tablet Take 1 tablet by mouth once daily.    folic acid (FOLVITE) 1 MG tablet Take 4 tablets (4 mg total) by mouth once daily.    magnesium oxide (MAG-OX) 400 mg (241.3 mg magnesium) tablet Take 2 tablets by mouth.    paroxetine (PAXIL) 20 MG tablet Take 1 tablet (20 mg total) by mouth every morning.    prochlorperazine (COMPAZINE) 10 MG tablet Take 10 mg by mouth once.    riboflavin, vitamin B2, (VITAMIN B-2) 100 mg Tab tablet Take 4 tablets (400 mg total) by mouth once daily.    sumatriptan (IMITREX) 50 MG tablet Take 1 tablet (50 mg total) by mouth as needed for Migraine. Take 1 tablet (50 mg total) by mouth as needed at the start of the migraine. You can take another pill 2 hours after the first one if you still have a headache. DO NOT TAKE MORE THAN TWO PILLS IN A 24 HOUR PERIOD.    tiZANidine (ZANAFLEX) 2 MG tablet Take 1 tablet (2 mg total) by mouth every 8 (eight) hours as needed (tension headache, muscle spasm).    topiramate (TOPAMAX) 25 MG tablet Take 1 tablet (25 mg total) by mouth once daily for 7 days, THEN 1 tablet (25 mg total) 2 (two) times daily.    [DISCONTINUED] cloBAZam (ONFI) 10 mg Tab Take 1 each (10 mg total) by mouth daily with lunch. (Patient taking differently: Take 5 mg by mouth daily with lunch.)    [DISCONTINUED] divalproex ER (DEPAKOTE ER) 500 MG Tb24 24 hr tablet Take 2 tablets (1,000 mg  total) by mouth once. for 1 dose    [DISCONTINUED] ketorolac (TORADOL) 10 mg tablet Take by mouth.    [DISCONTINUED] magnesium glycinate 100 mg Tab Take 400 mg by mouth every evening.    [DISCONTINUED] methylPREDNISolone (MEDROL DOSEPACK) 4 mg tablet use as directed     Family History    None       Tobacco Use    Smoking status: Never    Smokeless tobacco: Never   Substance and Sexual Activity    Alcohol use: Not on file    Drug use: Never    Sexual activity: Yes     Review of Systems   Neurological:  Positive for seizures.     Objective:     Vital Signs (Most Recent):  Temp: 98.5 °F (36.9 °C) (04/03/25 0712)  Pulse: 96 (04/03/25 1245)  Resp: 18 (04/03/25 1202)  BP: 105/74 (04/03/25 1202)  SpO2: 99 % (04/03/25 1202) Vital Signs (24h Range):  Temp:  [97.9 °F (36.6 °C)-98.7 °F (37.1 °C)] 98.5 °F (36.9 °C)  Pulse:  [63-98] 96  Resp:  [14-22] 18  SpO2:  [98 %-100 %] 99 %  BP: ()/(57-83) 105/74     Weight: 64 kg (141 lb)  Body mass index is 25.79 kg/m².     Physical Exam  Vitals and nursing note reviewed.   Constitutional:       General: She is not in acute distress.     Appearance: Normal appearance.   HENT:      Head: Normocephalic and atraumatic.      Mouth/Throat:      Mouth: Mucous membranes are moist.   Eyes:      Extraocular Movements: Extraocular movements intact.   Pulmonary:      Effort: Pulmonary effort is normal. No respiratory distress.   Abdominal:      General: Abdomen is flat. There is no distension.   Musculoskeletal:         General: No swelling. Normal range of motion.      Cervical back: Normal range of motion.   Skin:     General: Skin is warm.   Neurological:      Mental Status: She is alert and oriented to person, place, and time.      Cranial Nerves: Cranial nerves 2-12 are intact.   Psychiatric:         Mood and Affect: Mood normal.         Speech: Speech normal.         Behavior: Behavior normal.          NEUROLOGICAL EXAMINATION:     MENTAL STATUS   Oriented to person, place, and time.    Speech: speech is normal   Level of consciousness: alert    CRANIAL NERVES   Cranial nerves II through XII intact.     MOTOR EXAM   Muscle bulk: normal  Overall muscle tone: normal    Strength   Strength 5/5 except as noted.     REFLEXES     Reflexes   Reflexes 2+ except as noted.     SENSORY EXAM   Light touch normal.       Significant Labs: CBC:   Recent Labs   Lab 04/01/25 1710 04/02/25 1836 04/03/25  0339   WBC 10.03 7.50 7.52   HGB 12.5 13.3 11.2*   HCT 36.2* 39.7 33.3*    312 244     CMP:   Recent Labs   Lab 04/01/25 1710 04/02/25 1836 04/03/25  0339    139 140   K 3.5 3.4* 3.8   * 114* 116*   CO2 20* 20* 20*   BUN 6 5* 5*   CREATININE 0.7 0.6 0.6   CALCIUM 8.2* 8.4* 8.2*   MG  --  1.9  --    ALBUMIN 3.6 3.6 2.9*   BILITOT 0.2 0.3 0.2   ALKPHOS 59 53 43   AST 16 24 17   ALT 11 14 8*   ANIONGAP 6* 5* 4*       Significant Imaging: I have reviewed all pertinent imaging results/findings within the past 24 hours.

## 2025-04-03 NOTE — CONSULTS
[facility] Dept  Neurology  Consult Note    Patient Name: Annmarie Ruiz  MRN: [id number]  Admission Date: 4/2/2025  Hospital Length of Stay: 1 days  Code Status: Full Code   Attending Provider: Diane Mejia MD   Consulting Provider: Andi Edmondson MD  Primary Care Physician: Nusrat, Primary Doctor  Principal Problem:Seizure-like activity    Inpatient Consult to Neurology Services (General Neurology)  Consult performed by: Andi Edmondson MD  Consult ordered by: Valerie Paulino NP         Subjective:     Chief Complaint:  seizure-like activity     HPI:   Ms. Annmarie Ruiz is a 24 y.o. female with PMH of migraines and MIMI presenting for evaluation of seizure-like activity. Started in August 2024, has done MRI and EEG in [facility] in December which were reportedly normal but started on Onfi at the time. Cardiac work-up for syncopal events (event type 2 below) reportedly normal as well.    Patient has seen Dr. Oconnell as an outpatient and plan was to obtain repeat MRI and EEG along with EMU admission.   Patient completed EEG which was normal. MRI has not yet been completed.   She has been to the ED 2 times over the past 2 days for breakthrough events.  She states that she did have a possible GI illness over the past weekend with some nausea, vomiting, and diarrhea and was possibly dehydrated.     Event Type 1:   Seizure Description: bilateral (she thinks bilateral) shaking, confused afterwards but does not always lose consciousness  Aura: fatigue, mental fog   Associated Symptoms:  incontinence  Seizure Frequency: variable  Last seizure: 3/1/2025     Event Type 2:   Seizure Description: fainting with LOC  Aura: falling asleep  Associated Symptoms:  none  Seizure Frequency: 8 in 6 weeks  Last seizure: October 2024     Handedness: right  Seizure Onset Age: 23  Seizure/ Epilepsy Risk Factors: prior head injury  Birth/Developmental History: normal birth history and normal developmental  history  Seizure Triggers/ Provoking Features: none known  Current AEDs: Clobazam 5 mg at lunch, 10 mg qhs  Previous Seizure Medications: clobazam (Onfi or Frizium, CLB)  Recent Med Changes: Started clobazam in January (from [facility])  Other Treatments: None   Prior Episodes of Status: None  Psychiatric/Behavioral Comorbitidies: MIMI  Surgical Candidacy: TBD     Prior Studies:  EEG : in [place] reportedly normal  vEEG/ EMU evaluation: None  MRI of brain: non-contrasted in [facility] reportedly normal  AED levels:  None  CT/CTA Scan:  CT 3/5/2025 normal  PET Scan: None  Neuropsychological Evaluation: None  DEXA Scan: None  Other studies: None      Past Medical History:   Diagnosis Date    Seizures        Past Surgical History:   Procedure Laterality Date    FUSION, JOINT, WRIST Right     right       Review of patient's allergies indicates:   Allergen Reactions    Peanut Rash           No current facility-administered medications on file prior to encounter.     Current Outpatient Medications on File Prior to Encounter   Medication Sig    ALPRAZolam (XANAX XR) 0.5 MG Tb24 Take 0.5 mg by mouth nightly. Half of tablet    cloBAZam (ONFI) 10 mg Tab Take 1 each (10 mg total) by mouth every evening.    drospirenone-ethinyl estradioL (SD) 3-0.02 mg per tablet Take 1 tablet by mouth once daily.    folic acid (FOLVITE) 1 MG tablet Take 4 tablets (4 mg total) by mouth once daily.    magnesium oxide (MAG-OX) 400 mg (241.3 mg magnesium) tablet Take 2 tablets by mouth.    paroxetine (PAXIL) 20 MG tablet Take 1 tablet (20 mg total) by mouth every morning.    prochlorperazine (COMPAZINE) 10 MG tablet Take 10 mg by mouth once.    riboflavin, vitamin B2, (VITAMIN B-2) 100 mg Tab tablet Take 4 tablets (400 mg total) by mouth once daily.    sumatriptan (IMITREX) 50 MG tablet Take 1 tablet (50 mg total) by mouth as needed for Migraine. Take 1 tablet (50 mg total) by mouth as needed at the start of the migraine. You can take another  pill 2 hours after the first one if you still have a headache. DO NOT TAKE MORE THAN TWO PILLS IN A 24 HOUR PERIOD.    tiZANidine (ZANAFLEX) 2 MG tablet Take 1 tablet (2 mg total) by mouth every 8 (eight) hours as needed (tension headache, muscle spasm).    topiramate (TOPAMAX) 25 MG tablet Take 1 tablet (25 mg total) by mouth once daily for 7 days, THEN 1 tablet (25 mg total) 2 (two) times daily.    [DISCONTINUED] cloBAZam (ONFI) 10 mg Tab Take 1 each (10 mg total) by mouth daily with lunch. (Patient taking differently: Take 5 mg by mouth daily with lunch.)    [DISCONTINUED] divalproex ER (DEPAKOTE ER) 500 MG Tb24 24 hr tablet Take 2 tablets (1,000 mg total) by mouth once. for 1 dose    [DISCONTINUED] ketorolac (TORADOL) 10 mg tablet Take by mouth.    [DISCONTINUED] magnesium glycinate 100 mg Tab Take 400 mg by mouth every evening.    [DISCONTINUED] methylPREDNISolone (MEDROL DOSEPACK) 4 mg tablet use as directed     Family History    None       Tobacco Use    Smoking status: Never    Smokeless tobacco: Never   Substance and Sexual Activity    Alcohol use: Not on file    Drug use: Never    Sexual activity: Yes     Review of Systems   Neurological:  Positive for seizures.     Objective:     Vital Signs (Most Recent):  Temp: 98.5 °F (36.9 °C) (04/03/25 0712)  Pulse: 96 (04/03/25 1245)  Resp: 18 (04/03/25 1202)  BP: 105/74 (04/03/25 1202)  SpO2: 99 % (04/03/25 1202) Vital Signs (24h Range):  Temp:  [97.9 °F (36.6 °C)-98.7 °F (37.1 °C)] 98.5 °F (36.9 °C)  Pulse:  [63-98] 96  Resp:  [14-22] 18  SpO2:  [98 %-100 %] 99 %  BP: ()/(57-83) 105/74     Weight: 64 kg (141 lb)  Body mass index is 25.79 kg/m².     Physical Exam  Vitals and nursing note reviewed.   Constitutional:       General: She is not in acute distress.     Appearance: Normal appearance.   HENT:      Head: Normocephalic and atraumatic.      Mouth/Throat:      Mouth: Mucous membranes are moist.   Eyes:      Extraocular Movements: Extraocular movements  intact.   Pulmonary:      Effort: Pulmonary effort is normal. No respiratory distress.   Abdominal:      General: Abdomen is flat. There is no distension.   Musculoskeletal:         General: No swelling. Normal range of motion.      Cervical back: Normal range of motion.   Skin:     General: Skin is warm.   Neurological:      Mental Status: She is alert and oriented to person, place, and time.      Cranial Nerves: Cranial nerves 2-12 are intact.   Psychiatric:         Mood and Affect: Mood normal.         Speech: Speech normal.         Behavior: Behavior normal.          NEUROLOGICAL EXAMINATION:     MENTAL STATUS   Oriented to person, place, and time.   Speech: speech is normal   Level of consciousness: alert    CRANIAL NERVES   Cranial nerves II through XII intact.     MOTOR EXAM   Muscle bulk: normal  Overall muscle tone: normal    Strength   Strength 5/5 except as noted.     REFLEXES     Reflexes   Reflexes 2+ except as noted.     SENSORY EXAM   Light touch normal.       Significant Labs: CBC:   Recent Labs   Lab 04/01/25 1710 04/02/25 1836 04/03/25  0339   WBC 10.03 7.50 7.52   HGB 12.5 13.3 11.2*   HCT 36.2* 39.7 33.3*    312 244     CMP:   Recent Labs   Lab 04/01/25 1710 04/02/25 1836 04/03/25  0339    139 140   K 3.5 3.4* 3.8   * 114* 116*   CO2 20* 20* 20*   BUN 6 5* 5*   CREATININE 0.7 0.6 0.6   CALCIUM 8.2* 8.4* 8.2*   MG  --  1.9  --    ALBUMIN 3.6 3.6 2.9*   BILITOT 0.2 0.3 0.2   ALKPHOS 59 53 43   AST 16 24 17   ALT 11 14 8*   ANIONGAP 6* 5* 4*       Significant Imaging: I have reviewed all pertinent imaging results/findings within the past 24 hours.  Assessment and Plan:     * Seizure-like activity  Ms. Annmarie Ruiz is a 24 y.o. female with PMH of migraines and MIMI presenting for evaluation of seizure-like activity. Started in August 2024, has done MRI and EEG in [facility] in December which were reportedly normal but started on Onfi at the time. Cardiac work-up for  syncopal events (event type 2 below) reportedly normal as well.  Patient has seen Dr. Oconnell as an outpatient and plan was to obtain repeat MRI and EEG along with EMU admission.   Patient completed EEG which was normal. MRI has not yet been completed. She has been to the ED 2 times over the past 2 days for breakthrough events. She states that she did have a possible GI illness over the past weekend with some nausea, vomiting, and diarrhea and was possibly dehydrated. She has no significant findings on neurological exam.    Recommendations:  - Continuous vEEG for episode capture  - Avoid use of ativan unless clinical seizure confirmed on EEG  - Seizure precautions  - Fall precautions, side rail padding in place  - Telemetry        VTE Risk Mitigation (From admission, onward)           Ordered     IP VTE LOW RISK PATIENT  Once         04/02/25 2228     Place sequential compression device  Until discontinued         04/02/25 2228                    Thank you for your consult. I will follow-up with patient. Please contact us if you have any additional questions.    Andi Edmondson MD  Neurology  [facility] Dept

## 2025-04-03 NOTE — PROGRESS NOTES
EEG Hook up  No sign of skin breakdown noticed   Head lightly wrapped up with a gauze    Skin Integrity: Normal     Gustavo Ochoa   04/03/2025 3:37 PM

## 2025-04-03 NOTE — HPI
Ms. Annmarie Ruiz is a 24 y.o. female with PMH of migraines and MIMI presenting for evaluation of seizure-like activity. Started in August 2024, has done MRI and EEG in [place] in December which were reportedly normal but started on Onfi at the time. Cardiac work-up for syncopal events (event type 2 below) reportedly normal as well.    Patient has seen Dr. Oconnell as an outpatient and plan was to obtain repeat MRI and EEG along with EMU admission.   Patient completed EEG which was normal. MRI has not yet been completed.   She has been to the ED 2 times over the past 2 days for breakthrough events.  She states that she did have a possible GI illness over the past weekend with some nausea, vomiting, and diarrhea and was possibly dehydrated.     Event Type 1:   Seizure Description: bilateral (she thinks bilateral) shaking, confused afterwards but does not always lose consciousness  Aura: fatigue, mental fog   Associated Symptoms:  incontinence  Seizure Frequency: variable  Last seizure: 3/1/2025     Event Type 2:   Seizure Description: fainting with LOC  Aura: falling asleep  Associated Symptoms:  none  Seizure Frequency: 8 in 6 weeks  Last seizure: October 2024     Handedness: right  Seizure Onset Age: 23  Seizure/ Epilepsy Risk Factors: prior head injury  Birth/Developmental History: normal birth history and normal developmental history  Seizure Triggers/ Provoking Features: none known  Current AEDs: Clobazam 5 mg at lunch, 10 mg qhs  Previous Seizure Medications: clobazam (Onfi or Frizium, CLB)  Recent Med Changes: Started clobazam in January (from [facility])  Other Treatments: None   Prior Episodes of Status: None  Psychiatric/Behavioral Comorbitidies: MIMI  Surgical Candidacy: TBD     Prior Studies:  EEG : in [place] reportedly normal  vEEG/ EMU evaluation: None  MRI of brain: non-contrasted in [place] reportedly normal  AED levels:  None  CT/CTA Scan:  CT 3/5/2025 normal  PET Scan:  None  Neuropsychological Evaluation: None  DEXA Scan: None  Other studies: None

## 2025-04-03 NOTE — ED NOTES
Patient became breathing rapidly, tachycardic and began with seziure like activity. Patient turned on side, PRN medication to be administered, provider MD Ike made aware.

## 2025-04-03 NOTE — ED NOTES
Patient complaining of 10/10 head and all over body pain. MD Yen made aware. No PRN orders currently, awaiting new orders.

## 2025-04-03 NOTE — SUBJECTIVE & OBJECTIVE
No past medical history on file.    Past Surgical History:   Procedure Laterality Date    FUSION, JOINT, WRIST Right     right       Review of patient's allergies indicates:   Allergen Reactions    Peanut Rash       No current facility-administered medications on file prior to encounter.     Current Outpatient Medications on File Prior to Encounter   Medication Sig    cloBAZam (ONFI) 10 mg Tab Take 1 each (10 mg total) by mouth daily with lunch.    cloBAZam (ONFI) 10 mg Tab Take 1 each (10 mg total) by mouth every evening.    divalproex ER (DEPAKOTE ER) 500 MG Tb24 24 hr tablet Take 2 tablets (1,000 mg total) by mouth once. for 1 dose    drospirenone-ethinyl estradioL (SD) 3-0.02 mg per tablet Take 1 tablet by mouth once daily.    folic acid (FOLVITE) 1 MG tablet Take 4 tablets (4 mg total) by mouth once daily.    ketorolac (TORADOL) 10 mg tablet Take by mouth.    magnesium glycinate 100 mg Tab Take 400 mg by mouth every evening.    magnesium oxide (MAG-OX) 400 mg (241.3 mg magnesium) tablet Take 2 tablets by mouth.    methylPREDNISolone (MEDROL DOSEPACK) 4 mg tablet use as directed    paroxetine (PAXIL) 20 MG tablet Take 1 tablet (20 mg total) by mouth every morning.    prochlorperazine (COMPAZINE) 10 MG tablet Take 10 mg by mouth once.    riboflavin, vitamin B2, (VITAMIN B-2) 100 mg Tab tablet Take 4 tablets (400 mg total) by mouth once daily.    sumatriptan (IMITREX) 50 MG tablet Take 1 tablet (50 mg total) by mouth as needed for Migraine. Take 1 tablet (50 mg total) by mouth as needed at the start of the migraine. You can take another pill 2 hours after the first one if you still have a headache. DO NOT TAKE MORE THAN TWO PILLS IN A 24 HOUR PERIOD.    tiZANidine (ZANAFLEX) 2 MG tablet Take 1 tablet (2 mg total) by mouth every 8 (eight) hours as needed (tension headache, muscle spasm).    topiramate (TOPAMAX) 25 MG tablet Take 1 tablet (25 mg total) by mouth once daily for 7 days, THEN 1 tablet (25 mg total) 2  (two) times daily.     Family History    None       Tobacco Use    Smoking status: Never    Smokeless tobacco: Never   Substance and Sexual Activity    Alcohol use: Not on file    Drug use: Never    Sexual activity: Yes     Review of Systems   Constitutional:  Negative for activity change, appetite change, chills, fever and unexpected weight change.   HENT:  Negative for congestion and sore throat.    Respiratory:  Negative for cough and shortness of breath.    Cardiovascular:  Negative for chest pain, palpitations and leg swelling.   Gastrointestinal:  Negative for abdominal distention, abdominal pain, blood in stool, constipation, diarrhea, nausea and vomiting.   Genitourinary:  Negative for difficulty urinating, dysuria and hematuria.   Musculoskeletal:  Negative for arthralgias and myalgias.   Skin:  Negative for color change and rash.   Neurological:  Positive for seizures. Negative for dizziness and tremors.     Objective:     Vital Signs (Most Recent):  Temp: 97.9 °F (36.6 °C) (04/02/25 1902)  Pulse: 70 (04/02/25 1902)  Resp: 16 (04/02/25 1902)  BP: 118/75 (04/02/25 1902)  SpO2: 100 % (04/02/25 1902) Vital Signs (24h Range):  Temp:  [97.9 °F (36.6 °C)-98.4 °F (36.9 °C)] 97.9 °F (36.6 °C)  Pulse:  [70-86] 70  Resp:  [16-22] 16  SpO2:  [99 %-100 %] 100 %  BP: (116-118)/(74-75) 118/75     Weight: 64 kg (141 lb)  Body mass index is 25.79 kg/m².     Physical Exam  Vitals reviewed.   Constitutional:       General: She is not in acute distress.     Appearance: She is well-developed.   HENT:      Head: Normocephalic and atraumatic.   Eyes:      Extraocular Movements: Extraocular movements intact.      Pupils: Pupils are equal, round, and reactive to light.   Neck:      Vascular: No JVD.      Trachea: No tracheal deviation.   Cardiovascular:      Rate and Rhythm: Normal rate and regular rhythm.      Heart sounds: No murmur heard.     No friction rub. No gallop.   Pulmonary:      Effort: No respiratory distress.       Breath sounds: Normal breath sounds. No wheezing or rales.   Abdominal:      General: Bowel sounds are normal. There is no distension.      Palpations: Abdomen is soft. There is no mass.      Tenderness: There is no abdominal tenderness.   Musculoskeletal:         General: No deformity.      Cervical back: Neck supple.   Lymphadenopathy:      Cervical: No cervical adenopathy.   Skin:     General: Skin is warm and dry.      Findings: No rash.   Neurological:      Mental Status: She is alert and oriented to person, place, and time.              CRANIAL NERVES     CN III, IV, VI   Pupils are equal, round, and reactive to light.       Significant Labs: All pertinent labs within the past 24 hours have been reviewed.    Significant Imaging: I have reviewed all pertinent imaging results/findings within the past 24 hours.

## 2025-04-03 NOTE — ED NOTES
Assumed care for pt after recieving report from nightshift RN. Pt. resting in bed in NAD, RR e/u. Vital signs stable and within desired limits at this time of assessment. Pt. offered bathroom assistance and denies need at this time. Explanation of care/wait provided. Pt verbalizes no needs at this time. Bed in low, locked position with rails up and call bell in reach. Pt's white board updated with today's care team and plan.     Patient identifiers for Annmarie Ruiz 24 y.o. female checked and correct.  Chief Complaint   Patient presents with    Seizures     Patient had two seizures while in physical therapy. Patient currently post ictal. The patient is endorsing no other complaints currently.      Past Medical History:   Diagnosis Date    Seizures      Allergies reported:   Review of patient's allergies indicates:   Allergen Reactions    Peanut Rash         LOC: Patient is awake, alert, and aware of environment with an appropriate affect. Patient is oriented x 4 and speaking appropriately.  APPEARANCE: Patient resting comfortably and in no acute distress. Patient is clean and well groomed, patient's clothing is properly fastened.  HEENT: WDL  SKIN: The skin is warm and dry. Patient has normal skin turgor and moist mucus membranes.   MUSCULOSKELETAL: Patient is moving all extremities well, no obvious deformities noted. Pulses intact. Pt endorses weakness.   RESPIRATORY: Airway is open and patent. Respirations are spontaneous and non-labored with normal effort and rate.  CARDIAC: Patient has a normal rate and rhythm. 74 on cardiac monitor. No peripheral edema noted. Denies chest pain and SOB at at time of assessment.   ABDOMEN: No distention noted. Soft and non-tender upon palpation.  NEUROLOGICAL: pupils 3mm, PERRL. Facial expression is symmetrical. Hand grasps are equal bilaterally. Normal sensation in all extremities when touched with finger.

## 2025-04-03 NOTE — ED NOTES
Pt had another episode, friend came out to get medical staff, nonrebreather placed on patient at this time.  MD verbal order for ativan.  Hospital team made aware again

## 2025-04-03 NOTE — ED NOTES
Pt having tremors and doesn't feel well, medical team made aware, waiting for EEG to come to hook patient up the 24 hr EEG monitor

## 2025-04-03 NOTE — ASSESSMENT & PLAN NOTE
-Pt. With multiple episodes of seizure like activity. Case discussed with neurology. Unclear at this point if seizures are epliptic in natrue.  They do not recommend medication change or dosage increase at this time. Recommend admitting to Hospital Medicine for long-term EEG  -Continue onfi  -cEEG ordered, seizure precations, ativan PRN for seizure >5 mins

## 2025-04-04 PROBLEM — E87.29 HYPERCHLOREMIC METABOLIC ACIDOSIS: Status: ACTIVE | Noted: 2025-04-04

## 2025-04-04 PROBLEM — R29.818 FUNCTIONAL NEUROLOGIC COMPLAINT: Status: ACTIVE | Noted: 2025-04-04

## 2025-04-04 PROBLEM — E83.51 HYPOCALCEMIA: Status: ACTIVE | Noted: 2025-04-04

## 2025-04-04 LAB
ABSOLUTE EOSINOPHIL (OHS): 0.45 K/UL
ABSOLUTE MONOCYTE (OHS): 0.67 K/UL (ref 0.3–1)
ABSOLUTE NEUTROPHIL COUNT (OHS): 4.98 K/UL (ref 1.8–7.7)
ALBUMIN SERPL BCP-MCNC: 3.3 G/DL (ref 3.5–5.2)
ALP SERPL-CCNC: 53 UNIT/L (ref 40–150)
ALT SERPL W/O P-5'-P-CCNC: 11 UNIT/L (ref 10–44)
ANION GAP (OHS): 6 MMOL/L (ref 8–16)
AST SERPL-CCNC: 15 UNIT/L (ref 11–45)
BASOPHILS # BLD AUTO: 0.06 K/UL
BASOPHILS NFR BLD AUTO: 0.7 %
BILIRUB SERPL-MCNC: 0.4 MG/DL (ref 0.1–1)
BUN SERPL-MCNC: 5 MG/DL (ref 6–20)
CALCIUM SERPL-MCNC: 8.6 MG/DL (ref 8.7–10.5)
CHLORIDE SERPL-SCNC: 111 MMOL/L (ref 95–110)
CO2 SERPL-SCNC: 20 MMOL/L (ref 23–29)
CREAT SERPL-MCNC: 0.6 MG/DL (ref 0.5–1.4)
ERYTHROCYTE [DISTWIDTH] IN BLOOD BY AUTOMATED COUNT: 11.9 % (ref 11.5–14.5)
GFR SERPLBLD CREATININE-BSD FMLA CKD-EPI: >60 ML/MIN/1.73/M2
GLUCOSE SERPL-MCNC: 88 MG/DL (ref 70–110)
HCT VFR BLD AUTO: 38.2 % (ref 37–48.5)
HGB BLD-MCNC: 12.7 GM/DL (ref 12–16)
IMM GRANULOCYTES # BLD AUTO: 0.03 K/UL (ref 0–0.04)
IMM GRANULOCYTES NFR BLD AUTO: 0.3 % (ref 0–0.5)
LACTATE SERPL-SCNC: 2.3 MMOL/L (ref 0.5–2.2)
LYMPHOCYTES # BLD AUTO: 2.62 K/UL (ref 1–4.8)
MCH RBC QN AUTO: 29.1 PG (ref 27–31)
MCHC RBC AUTO-ENTMCNC: 33.2 G/DL (ref 32–36)
MCV RBC AUTO: 88 FL (ref 82–98)
NUCLEATED RBC (/100WBC) (OHS): 0 /100 WBC
OHS QRS DURATION: 92 MS
OHS QTC CALCULATION: 419 MS
PLATELET # BLD AUTO: 289 K/UL (ref 150–450)
PMV BLD AUTO: 10.2 FL (ref 9.2–12.9)
POCT GLUCOSE: 90 MG/DL (ref 70–110)
POTASSIUM SERPL-SCNC: 3.5 MMOL/L (ref 3.5–5.1)
PROT SERPL-MCNC: 6.7 GM/DL (ref 6–8.4)
RBC # BLD AUTO: 4.36 M/UL (ref 4–5.4)
RELATIVE EOSINOPHIL (OHS): 5.1 %
RELATIVE LYMPHOCYTE (OHS): 29.7 % (ref 18–48)
RELATIVE MONOCYTE (OHS): 7.6 % (ref 4–15)
RELATIVE NEUTROPHIL (OHS): 56.6 % (ref 38–73)
SODIUM SERPL-SCNC: 137 MMOL/L (ref 136–145)
WBC # BLD AUTO: 8.81 K/UL (ref 3.9–12.7)

## 2025-04-04 PROCEDURE — 25000003 PHARM REV CODE 250: Performed by: HOSPITALIST

## 2025-04-04 PROCEDURE — 36415 COLL VENOUS BLD VENIPUNCTURE: CPT | Performed by: HOSPITALIST

## 2025-04-04 PROCEDURE — 25000003 PHARM REV CODE 250: Performed by: NURSE PRACTITIONER

## 2025-04-04 PROCEDURE — 83605 ASSAY OF LACTIC ACID: CPT | Performed by: STUDENT IN AN ORGANIZED HEALTH CARE EDUCATION/TRAINING PROGRAM

## 2025-04-04 PROCEDURE — 63600175 PHARM REV CODE 636 W HCPCS: Performed by: STUDENT IN AN ORGANIZED HEALTH CARE EDUCATION/TRAINING PROGRAM

## 2025-04-04 PROCEDURE — 85025 COMPLETE CBC W/AUTO DIFF WBC: CPT | Performed by: HOSPITALIST

## 2025-04-04 PROCEDURE — 80053 COMPREHEN METABOLIC PANEL: CPT | Performed by: HOSPITALIST

## 2025-04-04 PROCEDURE — 99233 SBSQ HOSP IP/OBS HIGH 50: CPT | Mod: ,,, | Performed by: PSYCHIATRY & NEUROLOGY

## 2025-04-04 PROCEDURE — 25000003 PHARM REV CODE 250: Performed by: STUDENT IN AN ORGANIZED HEALTH CARE EDUCATION/TRAINING PROGRAM

## 2025-04-04 PROCEDURE — 36415 COLL VENOUS BLD VENIPUNCTURE: CPT | Performed by: STUDENT IN AN ORGANIZED HEALTH CARE EDUCATION/TRAINING PROGRAM

## 2025-04-04 PROCEDURE — 11000001 HC ACUTE MED/SURG PRIVATE ROOM

## 2025-04-04 RX ORDER — CALCIUM CARBONATE 200(500)MG
500 TABLET,CHEWABLE ORAL 2 TIMES DAILY
Status: DISCONTINUED | OUTPATIENT
Start: 2025-04-04 | End: 2025-04-05

## 2025-04-04 RX ORDER — SODIUM CHLORIDE, SODIUM LACTATE, POTASSIUM CHLORIDE, CALCIUM CHLORIDE 600; 310; 30; 20 MG/100ML; MG/100ML; MG/100ML; MG/100ML
INJECTION, SOLUTION INTRAVENOUS CONTINUOUS
Status: ACTIVE | OUTPATIENT
Start: 2025-04-04 | End: 2025-04-04

## 2025-04-04 RX ORDER — LORAZEPAM 2 MG/ML
2 INJECTION INTRAMUSCULAR
Status: DISCONTINUED | OUTPATIENT
Start: 2025-04-04 | End: 2025-04-04

## 2025-04-04 RX ORDER — CLOBAZAM 2.5 MG/ML
5 SUSPENSION ORAL NIGHTLY
Status: DISCONTINUED | OUTPATIENT
Start: 2025-04-04 | End: 2025-04-05 | Stop reason: HOSPADM

## 2025-04-04 RX ADMIN — SODIUM CHLORIDE, SODIUM LACTATE, POTASSIUM CHLORIDE, AND CALCIUM CHLORIDE: .6; .31; .03; .02 INJECTION, SOLUTION INTRAVENOUS at 03:04

## 2025-04-04 RX ADMIN — CALCIUM CARBONATE (ANTACID) CHEW TAB 500 MG 500 MG: 500 CHEW TAB at 03:04

## 2025-04-04 RX ADMIN — ACETAMINOPHEN 650 MG: 325 TABLET ORAL at 03:04

## 2025-04-04 RX ADMIN — Medication 6 MG: at 08:04

## 2025-04-04 RX ADMIN — SUMATRIPTAN SUCCINATE 50 MG: 50 TABLET ORAL at 06:04

## 2025-04-04 RX ADMIN — SUMATRIPTAN SUCCINATE 50 MG: 50 TABLET ORAL at 10:04

## 2025-04-04 RX ADMIN — TOPIRAMATE 25 MG: 25 TABLET, FILM COATED ORAL at 08:04

## 2025-04-04 RX ADMIN — SUMATRIPTAN SUCCINATE 50 MG: 50 TABLET ORAL at 12:04

## 2025-04-04 RX ADMIN — CALCIUM CARBONATE (ANTACID) CHEW TAB 500 MG 500 MG: 500 CHEW TAB at 08:04

## 2025-04-04 RX ADMIN — CLOBAZAM 5 MG: 2.5 SUSPENSION ORAL at 08:04

## 2025-04-04 NOTE — ASSESSMENT & PLAN NOTE
Will continue to monitor.  May be related to IV fluids.  Provided lactated Ringer's instead of normal saline

## 2025-04-04 NOTE — PROGRESS NOTES
[facility] - Neurosurgery ([facility])  Neurology  Progress Note    Patient Name: Annmarie Ruiz  MRN: [id number]  Admission Date: 4/2/2025  Hospital Length of Stay: 2 days  Code Status: Full Code   Attending Provider: Corey Daniels MD  Primary Care Physician: Nusrat, Primary Doctor   Principal Problem:Seizure-like activity    HPI:   Ms. Annmarie Ruiz is a 24 y.o. female with PMH of migraines and MIMI presenting for evaluation of seizure-like activity. Started in August 2024, has done MRI and EEG in [facility] in December which were reportedly normal but started on Onfi at the time. Cardiac work-up for syncopal events (event type 2 below) reportedly normal as well.    Patient has seen Dr. Oconnell as an outpatient and plan was to obtain repeat MRI and EEG along with EMU admission.   Patient completed EEG which was normal. MRI has not yet been completed.   She has been to the ED 2 times over the past 2 days for breakthrough events.  She states that she did have a possible GI illness over the past weekend with some nausea, vomiting, and diarrhea and was possibly dehydrated.     Event Type 1:   Seizure Description: bilateral (she thinks bilateral) shaking, confused afterwards but does not always lose consciousness  Aura: fatigue, mental fog   Associated Symptoms:  incontinence  Seizure Frequency: variable  Last seizure: 3/1/2025     Event Type 2:   Seizure Description: fainting with LOC  Aura: falling asleep  Associated Symptoms:  none  Seizure Frequency: 8 in 6 weeks  Last seizure: October 2024     Handedness: right  Seizure Onset Age: 23  Seizure/ Epilepsy Risk Factors: prior head injury  Birth/Developmental History: normal birth history and normal developmental history  Seizure Triggers/ Provoking Features: none known  Current AEDs: Clobazam 5 mg at lunch, 10 mg qhs  Previous Seizure Medications: clobazam (Onfi or Frizium, CLB)  Recent Med Changes: Started clobazam in January (from [facility])  Other Treatments:  None   Prior Episodes of Status: None  Psychiatric/Behavioral Comorbitidies: MIMI  Surgical Candidacy: TBD     Prior Studies:  EEG : in [facility] reportedly normal  vEEG/ EMU evaluation: None  MRI of brain: non-contrasted in [facility] reportedly normal  AED levels:  None  CT/CTA Scan:  [facility] 3/5/2025 normal  PET Scan: None  Neuropsychological Evaluation: None  DEXA Scan: None  Other studies: None           Subjective:     Interval History: Multiple events overnight and today.      Current Medications[1]    Review of Systems   Neurological:  Positive for seizures.     Objective:     Vital Signs (Most Recent):  Temp: 98.6 °F (37 °C) (04/04/25 1552)  Pulse: 71 (04/04/25 1552)  Resp: 18 (04/04/25 1552)  BP: 109/72 (04/04/25 1552)  SpO2: 99 % (04/04/25 1552) Vital Signs (24h Range):  Temp:  [97.9 °F (36.6 °C)-98.6 °F (37 °C)] 98.6 °F (37 °C)  Pulse:  [] 71  Resp:  [12-25] 18  SpO2:  [79 %-99 %] 99 %  BP: (101-148)/() 109/72     Weight: 64 kg (141 lb)  Body mass index is 25.79 kg/m².     Physical Exam  Vitals and nursing note reviewed.   Constitutional:       General: She is not in acute distress.     Appearance: Normal appearance.   HENT:      Head: Normocephalic and atraumatic.      Mouth/Throat:      Mouth: Mucous membranes are moist.   Eyes:      Extraocular Movements: Extraocular movements intact.   Pulmonary:      Effort: Pulmonary effort is normal. No respiratory distress.   Abdominal:      General: Abdomen is flat. There is no distension.   Musculoskeletal:         General: No swelling. Normal range of motion.      Cervical back: Normal range of motion.   Skin:     General: Skin is warm.   Neurological:      Mental Status: She is alert and oriented to person, place, and time.      Cranial Nerves: Cranial nerves 2-12 are intact.   Psychiatric:         Mood and Affect: Mood normal.         Speech: Speech normal.         Behavior: Behavior normal.          NEUROLOGICAL EXAMINATION:     MENTAL STATUS    Oriented to person, place, and time.   Speech: speech is normal   Level of consciousness: alert    CRANIAL NERVES   Cranial nerves II through XII intact.     MOTOR EXAM   Muscle bulk: normal  Overall muscle tone: normal    Strength   Strength 5/5 except as noted.     REFLEXES     Reflexes   Reflexes 2+ except as noted.     SENSORY EXAM   Light touch normal.       Significant Labs: All pertinent lab results from the past 24 hours have been reviewed.    Significant Imaging: I have reviewed all pertinent imaging results/findings within the past 24 hours.    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  No focal or epileptiform findings to suggest a concurrent diagnosis of epilepsy.    Assessment and Plan:     * Seizure-like activity  Ms. Annmarie Ruiz is a 24 y.o. female with PMH of migraines and MIMI presenting for evaluation of seizure-like activity. Started in August 2024, has done MRI and EEG in [facility] in December which were reportedly normal but started on Onfi at the time. Cardiac work-up for syncopal events (event type 2 below) reportedly normal as well.  Patient has seen Dr. Oconnell as an outpatient and plan was to obtain repeat MRI and EEG along with EMU admission.   Patient completed EEG which was normal. MRI has not yet been completed. She has been to the ED 2 times over the past 2 days for breakthrough events. She states that she did have a possible GI illness over the past weekend with some nausea, vomiting, and diarrhea and was possibly dehydrated. She has no significant findings on neurological exam.    4/4: Multiple events overnight and today. 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  No focal or epileptiform findings to suggest a concurrent diagnosis of  epilepsy.    Recommendations:  - Discussed diagnosis of FND/non-epileptic events with patient and boyfriend at bedside  - OK to discontinue EEG  - Can decrease Onfi to 5mg x1 week then discontinue  - Can continue Topamax for HA prophylaxis  - Outpatient f/u w/ Psychology for CBT (will send message to neurology LCSW to see if she can get an appt prior to returning to [facility])  - Maintain seizure precautions until events are controlled  - Neurology will sign off, please contact us with any further questions or concerns        VTE Risk Mitigation (From admission, onward)           Ordered     IP VTE LOW RISK PATIENT  Once         04/02/25 2228     Place sequential compression device  Until discontinued         04/02/25 2228                    Andi Edmondson MD  Neurology  [facility] - Neurosurgery ([facility])       [1]   Current Facility-Administered Medications   Medication Dose Route Frequency Provider Last Rate Last Admin    acetaminophen tablet 650 mg  650 mg Oral Q4H PRN Vasu Mosqueda MD   650 mg at 04/04/25 1538    calcium carbonate 200 mg calcium (500 mg) chewable tablet 500 mg  500 mg Oral BID Corey Daniels MD   500 mg at 04/04/25 1538    cloBAZam Tab 10 mg  10 mg Oral QHS Vasu Mosqueda MD   10 mg at 04/03/25 2211    dextrose 50% injection 12.5 g  12.5 g Intravenous PRVasu Garduno MD        dextrose 50% injection 25 g  25 g Intravenous PRVasu Garduno MD        glucagon (human recombinant) injection 1 mg  1 mg Intramuscular PRN Vasu Mosqueda MD        glucose chewable tablet 16 g  16 g Oral PRVasu Garduno MD        glucose chewable tablet 24 g  24 g Oral PRN Vasu Mosqueda MD        ketorolac injection 15 mg  15 mg Intravenous Once Tosha Barlow DO        lactated ringers infusion   Intravenous Continuous Corey Daniels  mL/hr at 04/04/25 1538 New Bag at 04/04/25 1538    LORazepam injection 2 mg  2 mg Intravenous Q2H PRN Raghav He MD         melatonin tablet 6 mg  6 mg Oral Nightly PRN Valerie Paulino, NP        naloxone 0.4 mg/mL injection 0.02 mg  0.02 mg Intravenous PRN Vasu Mosqueda MD        ondansetron injection 4 mg  4 mg Intravenous Q8H PRN Vasu Mosqueda MD        prochlorperazine injection Soln 5 mg  5 mg Intravenous Q6H PRN Vasu Mosqueda MD        sodium chloride 0.9% flush 10 mL  10 mL Intravenous PRN Valerie Paulino, DANIELLE        sumatriptan tablet 50 mg  50 mg Oral Q2H PRN Vasu Mosqueda MD   50 mg at 04/04/25 1013    topiramate tablet 25 mg  25 mg Oral BID Vasu Mosqueda MD   25 mg at 04/04/25 0855

## 2025-04-04 NOTE — ASSESSMENT & PLAN NOTE
-neurology now believes these are nonepileptic episodes.  Appreciate neurology recommendations.  Will continue Topamax.  Decreasing clobazam dose to 5 mg nightly for one week then to discontinue  -discontinuing EEG  -patient planning to initiate cognitive behavioral therapy with her psychologist in [facility]

## 2025-04-04 NOTE — SUBJECTIVE & OBJECTIVE
Subjective:     Interval History: Multiple events overnight and today.      Current Medications[1]    Review of Systems   Neurological:  Positive for seizures.     Objective:     Vital Signs (Most Recent):  Temp: 98.6 °F (37 °C) (04/04/25 1552)  Pulse: 71 (04/04/25 1552)  Resp: 18 (04/04/25 1552)  BP: 109/72 (04/04/25 1552)  SpO2: 99 % (04/04/25 1552) Vital Signs (24h Range):  Temp:  [97.9 °F (36.6 °C)-98.6 °F (37 °C)] 98.6 °F (37 °C)  Pulse:  [] 71  Resp:  [12-25] 18  SpO2:  [79 %-99 %] 99 %  BP: (101-148)/() 109/72     Weight: 64 kg (141 lb)  Body mass index is 25.79 kg/m².     Physical Exam  Vitals and nursing note reviewed.   Constitutional:       General: She is not in acute distress.     Appearance: Normal appearance.   HENT:      Head: Normocephalic and atraumatic.      Mouth/Throat:      Mouth: Mucous membranes are moist.   Eyes:      Extraocular Movements: Extraocular movements intact.   Pulmonary:      Effort: Pulmonary effort is normal. No respiratory distress.   Abdominal:      General: Abdomen is flat. There is no distension.   Musculoskeletal:         General: No swelling. Normal range of motion.      Cervical back: Normal range of motion.   Skin:     General: Skin is warm.   Neurological:      Mental Status: She is alert and oriented to person, place, and time.      Cranial Nerves: Cranial nerves 2-12 are intact.   Psychiatric:         Mood and Affect: Mood normal.         Speech: Speech normal.         Behavior: Behavior normal.          NEUROLOGICAL EXAMINATION:     MENTAL STATUS   Oriented to person, place, and time.   Speech: speech is normal   Level of consciousness: alert    CRANIAL NERVES   Cranial nerves II through XII intact.     MOTOR EXAM   Muscle bulk: normal  Overall muscle tone: normal    Strength   Strength 5/5 except as noted.     REFLEXES     Reflexes   Reflexes 2+ except as noted.     SENSORY EXAM   Light touch normal.       Significant Labs: All pertinent lab results  from the past 24 hours have been reviewed.    Significant Imaging: I have reviewed all pertinent imaging results/findings within the past 24 hours.    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  No focal or epileptiform findings to suggest a concurrent diagnosis of epilepsy.         [1]   Current Facility-Administered Medications   Medication Dose Route Frequency Provider Last Rate Last Admin    acetaminophen tablet 650 mg  650 mg Oral Q4H PRN Vasu Mosqueda MD   650 mg at 04/04/25 1538    calcium carbonate 200 mg calcium (500 mg) chewable tablet 500 mg  500 mg Oral BID Corey Daniels MD   500 mg at 04/04/25 1538    cloBAZam Tab 10 mg  10 mg Oral QHS Vasu Mosqueda MD   10 mg at 04/03/25 2211    dextrose 50% injection 12.5 g  12.5 g Intravenous PRVasu Garduno MD        dextrose 50% injection 25 g  25 g Intravenous PRVasu Garduno MD        glucagon (human recombinant) injection 1 mg  1 mg Intramuscular PRVasu Garduno MD        glucose chewable tablet 16 g  16 g Oral PRVasu Garduno MD        glucose chewable tablet 24 g  24 g Oral PRVasu Garduno MD        ketorolac injection 15 mg  15 mg Intravenous Once Tosha Barlow DO        lactated ringers infusion   Intravenous Continuous Corey Daniels  mL/hr at 04/04/25 1538 New Bag at 04/04/25 1538    LORazepam injection 2 mg  2 mg Intravenous Q2H PRN Raghav He MD        melatonin tablet 6 mg  6 mg Oral Nightly PRN Valerie Paulino, NP        naloxone 0.4 mg/mL injection 0.02 mg  0.02 mg Intravenous PRN Vasu Mosqueda MD        ondansetron injection 4 mg  4 mg Intravenous Q8H PRVasu Garduno MD        prochlorperazine injection Soln 5 mg  5 mg Intravenous Q6H PRN Vasu Mosqueda MD        sodium chloride 0.9% flush 10 mL  10 mL Intravenous PRN Valerie Paulino, NP        sumatriptan tablet 50 mg  50 mg Oral Q2H  PRVasu Garduno MD   50 mg at 04/04/25 1013    topiramate tablet 25 mg  25 mg Oral BID Vasu Mosqueda MD   25 mg at 04/04/25 0893

## 2025-04-04 NOTE — NURSING TRANSFER
Nursing Transfer Note      4/4/2025   12:47 PM    Nurse giving handoff: Kendall RN  Nurse receiving handoff:Ana RN    Reason patient is being transferred: stepdown     Transfer To: NPU    Transfer via wheelchair    Transfer with cardiac monitoring    Transported by PCT        Telemetry:   Order for Tele Monitor? Yes    Additional Lines: EEG monitoring        Patient belongings transferred with patient: Yes    Chart send with patient: Yes

## 2025-04-04 NOTE — RESPIRATORY THERAPY
RAPID RESPONSE RESPIRATORY THERAPY PROACTIVE NOTE           Time of visit: 725     Code Status: Full Code   : 2000  Bed: 959/959 A:   MRN: [id number]  Time spent at the bedside: < 15 min    SITUATION    Evaluated patient for: neuro    BACKGROUND    Why is the patient in the hospital?: Seizure-like activity    Patient has a past medical history of Seizures.    24 Hours Vitals Range:  Temp:  [97.9 °F (36.6 °C)-98.6 °F (37 °C)]   Pulse:  []   Resp:  [12-24]   BP: (101-148)/()   SpO2:  [79 %-99 %]     Labs:    Recent Labs     25  1836 25  0339 25  0657    140 137   K 3.4* 3.8 3.5   * 116* 111*   CO2 20* 20* 20*   BUN 5* 5* 5*   CREATININE 0.6 0.6 0.6   MG 1.9  --   --             ASSESSMENT/INTERVENTIONS  Patient evaluated for Neurological problem. Staff concerns included seizure-like activity. The following interventions were performed: POCT glucose, continuous pulse ox monitoring , continuous cardiac monitoring continued, and Lactic acid.     Last VS   Temp: 98.6 °F (37 °C) ( 155)  Pulse: 71 ( 155)  Resp: 18 ( 155)  BP: 109/72 (1552)  SpO2: 99 % (1552)    Level of Consciousness: Level of Consciousness (AVPU): alert  Respiratory Effort: normal    Expansion/Accessory Muscle Usage:  no  All Lung Field Breath Sounds:    O2 Device/Concentration: room air  NIPPV: No Surgical airway: No  ETCO2 monitored:    Ambu at bedside: yes    Active Orders   There are no active orders of the following types: Respiratory Care.       RECOMMENDATIONS    Suggested Interventions: RRT Recs: Continue POC per primary team.    FOLLOW-UP    Please call back the Rapid Response RT, Margarette Jimenez, JUAN at x [phone] for any questions or concerns.

## 2025-04-04 NOTE — NURSING
Pt arrived to floor from ED on 24 hour EEG monitoring. As soon as pt transferred to bed pt having seizure like activity, tachypneic, tachycardic, tremors. Episode lasts for 3 minutes. Episode started at 11:15, OX=108/115; 140 HR; 99%RA; 36 RR. Episode ended at 11:18, HH=629/81, 76 HR; 99%RA; 18 RR. Notified MD. Orders placed for continuous heart monitor and pulse ox.

## 2025-04-04 NOTE — ASSESSMENT & PLAN NOTE
Hypocalcemia is likely due to Drug related - intravenous fluids and hypoalbuminemia. The most recent calcium and albumin results listed below.  Recent Labs     04/02/25  1836 04/03/25  0339 04/04/25  0657   CALCIUM 8.4* 8.2* 8.6*   ALBUMIN 3.6 2.9* 3.3*     Plan  - Will replace calcium and monitor electrolytes closely.  - The patient's hypocalcemia is stable  - provided some calcium carbonate

## 2025-04-04 NOTE — HOSPITAL COURSE
Patient initiated on EEG.  Has had multiple episodes witnessed.  Not consistent with epileptic seizures.  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  Patient provided education on this condition.  She is planning to seek the care her psychiatry and psychology team in [facility]. Provided referrals to seek local care in the meantime.

## 2025-04-04 NOTE — NURSING
Witnessed pt having seizure like activity,for approximately 2 minutes, tremors Resident was at bedside. MD made aware.     04/04/25 1025   Vital Signs   Pulse 100   Resp 12   SpO2 97 %   BP (!) 101/55   MAP (mmHg) 72

## 2025-04-04 NOTE — PROCEDURES
EEG REPORT      Annmarie Ruiz  [id number]  2000    DATE OF SERVICE: 4/3/2025     -1,1,2,3    METHODOLOGY      Extended electroencephalographic recording is made while the patient is ambulatory and continuing normal daily activities.  Electrodes are placed according to the International 10-20 placement system and included T1 and T2 electrode placement.  Twenty four (24) channels of digital signal (sampling rate of 512/sec) was simultaneously recorded from the scalp including EKG and eye monitors.  Recording band pass was 0.1 to 100 hz and all data was stored digitally on the recorder.  The patient is instructed to press an event button when clinical symptoms occur and write the symptoms into a diary. Activation procedures which include photic stimulation, hyperventilation and instructing patients to perform simple task are done in selected patients.        The EEG is displayed on a monitor screen and can be reformatted into different montages for evaluation.  The entire recoding is submitted for computer assisted analysis to detect spike and electrographic seizure activity.  The entire recording is visually reviewed and the times identified by computer analysis as being spikes or seizures are reviewed again.  Compresses spectral analysis (CSA) is also performed on the activity recorded from each individual channel.  This is displayed as a power display of frequencies from 0 to 30 Hz over time.   The CSA analysis is done and displayed continuously.  This is reviewed for asymmetries in power between homologous areas of the scalp and for presence of changes in power which canbe seen when seizures occur.  Sections of suspected abnormalities on the CSA is then compared with the original EEG recording.  .     [employer] software was also utilized in the review of this study.  This software suite analyzes the EEG recording in multiple domains.  Coherence and rhythmicity is computed to identify EEG sections  which may contain organized seizures.  Each channel undergoes analysis to detect presence of spike and sharp waves which have special and morphological characteristic of epileptic activity.  The routine EEG recording is converted from spacial into frequency domain.  This is then displayed comparing homologous areas to identify areas of significant asymmetry.  Algorithm to identify non-cortically generated artifact is used to separate eye movement, EMG and other artifact from the EEG     Recording Times  Start on 4/3/2025  Stop on 4/4/2025    A total of 10:30:35 and 8:10:25 and 00:07:18 and 4:26:13 hours of EEG was recorded.      EEG FINDINGS:  Background activity:   The background rhythm was characterized by alpha and anterior dominant beta activity with a 10Hz posterior dominant alpha rhythm at 30-70 microvolts.   Symmetry and continuity: the background was continuous and symmetric     Sleep:   Normal sleep transients including sleep spindles, K complexes, vertex waves and POSTS were seen.    Activation procedures:   NA    Abnormal activity:   No epileptiform discharges, periodic discharges, lateralized rhythmic delta activity or electrographic seizures were seen.    Events 1-3 (16:20 and 21:50)  Patient keeps eyes closed and begins shaking of the head progressing to irregular shaking of the arms and legs with some pelvic thrusting and intermittent coughing.  EEG shows continuation of normal waking rhythms only with movement artifact noted.    There are additional event marks at 14:20 and 17:55 with no clinical or EEG changes noted    IMPRESSION:   Normal one day video EEG with recording of three events which were non-epileptic, most likely psychogenic, in etiology.  No focal or epileptiform findings to suggest a concurrent diagnosis of epilepsy.      Romaine Barnes MD  Neurology-Epilepsy.  [facility].

## 2025-04-04 NOTE — PROGRESS NOTES
[facility] - Emergency Dept  [facility]  Progress Note    Patient Name: Annmarie Ruiz  MRN: [id number]  Patient Class: IP- Inpatient   Admission Date: 4/2/2025  Length of Stay: 1 days  Attending Physician: Diane Mejia MD  Primary Care Provider: Nusrat, Primary Doctor        Subjective     Principal Problem:Seizure-like activity        HPI:  23 yo F with PMHx recently dx'd seizure disorder, migraines and MIMI presenting for evaluation of seizure-like activity. Pt. Reportedly had 2 seizures described as tonic clonic with full body convulsions and LOC. Episodes lasted about 3 mins, it is unclear if there was post-ictal period. Pt. Was also seen in ED yesterday after having a similar episode and per report she arrived post-ictal/lethargic at that time. She has not had any observed seizure-like activity while in ED during current visit or yesterday's visit. At time of my interview, pt. Resting comfortably and states she feels back to normal. She reports compliance with her home Onfi.    Per chart review pt. Had first seizure-like activity in August 2024, had done MRI and EEG in [facility] in December which were reportedly normal but started on Onfi at the time. Cardiac work-up for syncopal eventsreportedly normal as well. She has not yet had MRI/EEG in system. CT head was normal earlier this month.    Overview/Hospital Course:  No notes on file    Interval history: No overnight events.  Patient this morning had an episode lasting around 5 minutes.  Discussed with Neurology.  EEG started.  Urology concerned if patient's episodes are psychological.      Review of Systems   Constitutional:  Negative for activity change, appetite change, chills, fever and unexpected weight change.   HENT:  Negative for congestion and sore throat.    Respiratory:  Negative for cough and shortness of breath.    Cardiovascular:  Negative for chest pain, palpitations and leg swelling.   Gastrointestinal:  Negative for abdominal  distention, abdominal pain, blood in stool, constipation, diarrhea, nausea and vomiting.   Genitourinary:  Negative for difficulty urinating, dysuria and hematuria.   Musculoskeletal:  Negative for arthralgias and myalgias.   Skin:  Negative for color change and rash.   Neurological:  Positive for seizures. Negative for dizziness and tremors.     Objective:     Vital Signs (Most Recent):  Temp: 98.5 °F (36.9 °C) (04/03/25 0712)  Pulse: 75 (04/03/25 1830)  Resp: 14 (04/03/25 1830)  BP: 110/82 (04/03/25 1830)  SpO2: 98 % (04/03/25 1830) Vital Signs (24h Range):  Temp:  [97.9 °F (36.6 °C)-98.7 °F (37.1 °C)] 98.5 °F (36.9 °C)  Pulse:  [] 75  Resp:  [14-25] 14  SpO2:  [98 %-100 %] 98 %  BP: ()/(57-83) 110/82     Weight: 64 kg (141 lb)  Body mass index is 25.79 kg/m².     Physical Exam  Vitals reviewed.   Constitutional:       General: She is not in acute distress.     Appearance: She is well-developed.   HENT:      Head: Normocephalic and atraumatic.   Eyes:      Extraocular Movements: Extraocular movements intact.      Pupils: Pupils are equal, round, and reactive to light.   Neck:      Vascular: No JVD.      Trachea: No tracheal deviation.   Cardiovascular:      Rate and Rhythm: Normal rate and regular rhythm.      Heart sounds: No murmur heard.     No friction rub. No gallop.   Pulmonary:      Effort: No respiratory distress.      Breath sounds: Normal breath sounds. No wheezing or rales.   Abdominal:      General: Bowel sounds are normal. There is no distension.      Palpations: Abdomen is soft. There is no mass.      Tenderness: There is no abdominal tenderness.   Musculoskeletal:         General: No deformity.      Cervical back: Neck supple.   Lymphadenopathy:      Cervical: No cervical adenopathy.   Skin:     General: Skin is warm and dry.      Findings: No rash.   Neurological:      Mental Status: She is alert and oriented to person, place, and time.              CRANIAL NERVES     CN III, IV, VI    Pupils are equal, round, and reactive to light.       Significant Labs: All pertinent labs within the past 24 hours have been reviewed.    Significant Imaging: I have reviewed all pertinent imaging results/findings within the past 24 hours.      Assessment & Plan  Seizure-like activity  -Pt. With multiple episodes of seizure like activity. Case discussed with neurology. Unclear at this point if seizures are epliptic in natrue.  They do not recommend medication change or dosage increase at this time. Recommend admitting to Hospital Medicine for long-term EEG  -Continue onfi  -cEEG ordered, seizure precations, ativan PRN for seizure >5 mins  -neurology consulted.  Hypokalemia  -PO K+ ordered, repeat in morning and replace PRN  VTE Risk Mitigation (From admission, onward)           Ordered     IP VTE LOW RISK PATIENT  Once         04/02/25 2228     Place sequential compression device  Until discontinued         04/02/25 2228                    Discharge Planning   LACHO:      Code Status: Full Code   Medical Readiness for Discharge Date:                            Diane Mejia MD  Department of Hospital Medicine   [facility] - Emergency Dept

## 2025-04-04 NOTE — ASSESSMENT & PLAN NOTE
-Pt. With multiple episodes of seizure like activity. Case discussed with neurology. Unclear at this point if seizures are epliptic in natrue.  They do not recommend medication change or dosage increase at this time. Recommend admitting to Hospital Medicine for long-term EEG  -Continue onfi  -cEEG ordered, seizure precations, ativan PRN for seizure >5 mins  -neurology consulted.

## 2025-04-04 NOTE — CARE UPDATE
RAPID RESPONSE NURSE PROACTIVE ROUNDING NOTE       Time of Visit: 716    Patient Information:  Admit Date: 2025  LOS: 2  Code Status: Full Code   Date of Visit: 2025  : 2000  Age: 24 y.o.  Sex: female  Race: Patient Refused  Bed: [address] [place] [address] A:   MRN: [id number]    Recent Clinical History:  ICU discharge this admission? No   PACU discharge (last 24 hours)? No   Received conscious sedation/general anesthesia (last 24 hours)? No  ED Visit (Last 24 hours)? Yes  On NIPPV (Last 24 hours)? No     Primary Team:  Attending Physician: Corey Daniels MD  Primary Service: [facility]     SITUATION    Notification source: Charge RN via phone call.  Reason for alert: seizure like activity  Alert category: Neuro     BACKGROUND     Primary Reason for Admission: Seizure-like activity    Patient has a past medical history of Seizures.    Last Vitals:  Temp: 97.9 °F (36.6 °C) (816)  Pulse: 90 (816)  Resp: 18 (724)  BP: 110/76 (816)  SpO2: 98 % (816)    24 Hours Vitals Range:  Temp:  [97.9 °F (36.6 °C)-98.4 °F (36.9 °C)]   Pulse:  []   Resp:  [14-25]   BP: (105-148)/()   SpO2:  [79 %-100 %]     Labs:  Recent Labs     25  1836 25  0339 25  0657   WBC 7.50 7.52 8.81   HGB 13.3 11.2* 12.7   HCT 39.7 33.3* 38.2    244 289       Recent Labs     25  18325  0339 25  0657    140 137   K 3.4* 3.8 3.5   * 116* 111*   CO2 20* 20* 20*   BUN 5* 5* 5*   CREATININE 0.6 0.6 0.6   MG 1.9  --   --       ASSESSMENT      Physical Exam  Constitutional:       General: She is not in acute distress.  Cardiovascular:      Rate and Rhythm: Normal rate and regular rhythm.      Pulses: Normal pulses.   Pulmonary:      Effort: Pulmonary effort is normal. No respiratory distress.      Breath sounds: No wheezing or rhonchi.   Abdominal:      General: Abdomen is flat.      Palpations: Abdomen is soft.   Skin:     General: Skin is warm and  dry.      Capillary Refill: Capillary refill takes 2 to 3 seconds.   Neurological:      General: No focal deficit present.      GCS: GCS eye subscore is 3. GCS verbal subscore is 5. GCS motor subscore is 6.     Called to see pt for seizure like activity.  Pt resting in bed lying with eyes closed, EEG in place.  Opens to verbal and answers appropriately, VILLA x 4 but sleepy in no acute distress.  Pt awake and hemodynamically stable while protecting her airway throughout the event, with no seizure like activity witnessed.  No immediate critical care needs were identified.    INTERVENTIONS    Patient evaluated for Neurological problem. Staff concerns included seizure-like activity. The following interventions were performed: POCT glucose, continuous pulse ox monitoring , continuous cardiac monitoring continued, and Lactic acid.    Time spent at the bedside: 15 -30 min    RECOMMENDATIONS    We Recommend: Continuous telemetry monitoring, Continuous SpO2 monitoring, Maintain IV access, Seizure precautions, Monitor for signs of hemodynamic instability, Obtain repeat labs and monitor trends, Notify Rapid Response of decline in patient status, and transfer to Step down unit    PROVIDER ESCALATION    Escalation Required:  No    Orders received and case discussed with NA.    Patient Disposition: Tx to [facility], bed TBD.    FOLLOW-UP    Charge nurseSofia  updated on plan of care. Instructed to contact Rapid Response Nurse, Kurtis Agarwal RN at [phone] for further questions or concerns.

## 2025-04-04 NOTE — PROGRESS NOTES
[facility] - Neurosurgery ([facility])  [facility]  Progress Note    Patient Name: Annmarie Ruiz  MRN: [id number]  Patient Class: IP- Inpatient   Admission Date: 4/2/2025  Length of Stay: 2 days  Attending Physician: Corey Daniels MD  Primary Care Provider: Nusrat, Primary Doctor        Subjective     Principal Problem:Seizure-like activity        HPI:  23 yo F with PMHx recently dx'd seizure disorder, migraines and MIMI presenting for evaluation of seizure-like activity. Pt. Reportedly had 2 seizures described as tonic clonic with full body convulsions and LOC. Episodes lasted about 3 mins, it is unclear if there was post-ictal period. Pt. Was also seen in ED yesterday after having a similar episode and per report she arrived post-ictal/lethargic at that time. She has not had any observed seizure-like activity while in ED during current visit or yesterday's visit. At time of my interview, pt. Resting comfortably and states she feels back to normal. She reports compliance with her home Onfi.    Per chart review pt. Had first seizure-like activity in August 2024, had done MRI and EEG in [facility] in December which were reportedly normal but started on Onfi at the time. Cardiac work-up for syncopal eventsreportedly normal as well. She has not yet had MRI/EEG in system. CT head was normal earlier this month.    Overview/Hospital Course:  Patient initiated on EEG.  Has had multiple episodes witnessed.  Not consistent with epileptic seizures.    Interval history:   Patient with multiple witnessed episodes.  EEG reading normal at that time.  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  Patient seemed reassured by this diagnosis.      Review of Systems   Constitutional:  Negative for activity change, appetite change, chills, fever and unexpected weight change.   HENT:  Negative for congestion and sore throat.    Respiratory:  Negative for cough and shortness of breath.    Cardiovascular:   Negative for chest pain, palpitations and leg swelling.   Gastrointestinal:  Negative for abdominal distention, abdominal pain, blood in stool, constipation, diarrhea, nausea and vomiting.   Genitourinary:  Negative for difficulty urinating, dysuria and hematuria.   Musculoskeletal:  Negative for arthralgias and myalgias.   Skin:  Negative for color change and rash.   Neurological:  Positive for seizures. Negative for dizziness and tremors.     Objective:     Vital Signs (Most Recent):  Temp: 98.6 °F (37 °C) (04/04/25 1552)  Pulse: 71 (04/04/25 1552)  Resp: 18 (04/04/25 1552)  BP: 109/72 (04/04/25 1552)  SpO2: 99 % (04/04/25 1552) Vital Signs (24h Range):  Temp:  [97.9 °F (36.6 °C)-98.6 °F (37 °C)] 98.6 °F (37 °C)  Pulse:  [] 71  Resp:  [12-24] 18  SpO2:  [79 %-99 %] 99 %  BP: (101-148)/() 109/72     Weight: 64 kg (141 lb)  Body mass index is 25.79 kg/m².     Physical Exam  Vitals reviewed.   Constitutional:       General: She is not in acute distress.     Appearance: She is well-developed.   HENT:      Head: Normocephalic and atraumatic.   Eyes:      Extraocular Movements: Extraocular movements intact.      Pupils: Pupils are equal, round, and reactive to light.   Neck:      Vascular: No JVD.      Trachea: No tracheal deviation.   Cardiovascular:      Rate and Rhythm: Normal rate and regular rhythm.      Heart sounds: No murmur heard.     No friction rub. No gallop.   Pulmonary:      Effort: No respiratory distress.      Breath sounds: Normal breath sounds. No wheezing or rales.   Abdominal:      General: Bowel sounds are normal. There is no distension.      Palpations: Abdomen is soft. There is no mass.      Tenderness: There is no abdominal tenderness.   Musculoskeletal:         General: No deformity.      Cervical back: Neck supple.   Lymphadenopathy:      Cervical: No cervical adenopathy.   Skin:     General: Skin is warm and dry.      Findings: No rash.   Neurological:      Mental Status: She is  alert and oriented to person, place, and time.              CRANIAL NERVES     CN III, IV, VI   Pupils are equal, round, and reactive to light.       Significant Labs: All pertinent labs within the past 24 hours have been reviewed.    Significant Imaging: I have reviewed all pertinent imaging results/findings within the past 24 hours.      Assessment & Plan  Seizure-like activity  -neurology now believes these are nonepileptic episodes.  Appreciate neurology recommendations.  Will continue Topamax.  Decreasing clobazam dose to 5 mg nightly for one week then to discontinue  -discontinuing EEG  -patient planning to initiate cognitive behavioral therapy with her psychologist in [facility]  Hypokalemia  -PO K+ ordered, repeat in morning and replace PRN  Functional neurologic complaint      Hypocalcemia  Hypocalcemia is likely due to Drug related - intravenous fluids and hypoalbuminemia . The most recent calcium and albumin results listed below.  Recent Labs     04/02/25  1836 04/03/25  0339 04/04/25  0657   CALCIUM 8.4* 8.2* 8.6*   ALBUMIN 3.6 2.9* 3.3*     Plan  - Will replace calcium and monitor electrolytes closely.  - The patient's hypocalcemia is stable  - provided some calcium carbonate      Hyperchloremic metabolic acidosis  Will continue to monitor.  May be related to IV fluids.  Provided lactated Ringer's instead of normal saline    VTE Risk Mitigation (From admission, onward)           Ordered     IP VTE LOW RISK PATIENT  Once         04/02/25 2228     Place sequential compression device  Until discontinued         04/02/25 2228                    Discharge Planning   LACHO: 4/6/2025     Code Status: Full Code   Medical Readiness for Discharge Date: 4/5                           Corey Daniels MD  Department of Hospital Medicine   [facility] - Neurosurgery ([facility])

## 2025-04-04 NOTE — NURSING
Notified by friends that pt was having seizure like activity. Upon entering room pt having seizure like acitiviy, tremors, tachycardic, tachypneic. Pt O2=79%, VO=779; placed on non rebreather. Rapid called overhead. Episode last 5 minutes. New orders placed

## 2025-04-04 NOTE — CARE UPDATE
I have reviewed the chart of Annmarie Ruiz who is hospitalized for the following:    Active Hospital Problems    Diagnosis    *Seizure-like activity    Functional neurologic complaint    Hypokalemia        Tracee Donald NP  Unit Based ROSIE

## 2025-04-04 NOTE — ASSESSMENT & PLAN NOTE
Ms. Annmarie Ruiz is a 24 y.o. female with PMH of migraines and MIMI presenting for evaluation of seizure-like activity. Started in August 2024, has done MRI and EEG in [facility] in December which were reportedly normal but started on Onfi at the time. Cardiac work-up for syncopal events (event type 2 below) reportedly normal as well.  Patient has seen Dr. Oconnell as an outpatient and plan was to obtain repeat MRI and EEG along with EMU admission.   Patient completed EEG which was normal. MRI has not yet been completed. She has been to the ED 2 times over the past 2 days for breakthrough events. She states that she did have a possible GI illness over the past weekend with some nausea, vomiting, and diarrhea and was possibly dehydrated. She has no significant findings on neurological exam.    4/4: Multiple events overnight and today. 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  No focal or epileptiform findings to suggest a concurrent diagnosis of epilepsy.    Recommendations:  - Discussed diagnosis of FND/non-epileptic events with patient and boyfriend at bedside  - OK to discontinue EEG  - Can decrease Onfi to 5mg x1 week then discontinue  - Can continue Topamax for HA prophylaxis  - Outpatient f/u w/ Psychology for CBT (will send message to neurology LCSW to see if she can get an appt prior to returning to [facility])  - Maintain seizure precautions until events are controlled  - Neurology will sign off, please contact us with any further questions or concerns

## 2025-04-04 NOTE — SUBJECTIVE & OBJECTIVE
Interval history:   Patient with multiple witnessed episodes.  EEG reading normal at that time.  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  Patient seemed reassured by this diagnosis.      Review of Systems   Constitutional:  Negative for activity change, appetite change, chills, fever and unexpected weight change.   HENT:  Negative for congestion and sore throat.    Respiratory:  Negative for cough and shortness of breath.    Cardiovascular:  Negative for chest pain, palpitations and leg swelling.   Gastrointestinal:  Negative for abdominal distention, abdominal pain, blood in stool, constipation, diarrhea, nausea and vomiting.   Genitourinary:  Negative for difficulty urinating, dysuria and hematuria.   Musculoskeletal:  Negative for arthralgias and myalgias.   Skin:  Negative for color change and rash.   Neurological:  Positive for seizures. Negative for dizziness and tremors.     Objective:     Vital Signs (Most Recent):  Temp: 98.6 °F (37 °C) (04/04/25 1552)  Pulse: 71 (04/04/25 1552)  Resp: 18 (04/04/25 1552)  BP: 109/72 (04/04/25 1552)  SpO2: 99 % (04/04/25 1552) Vital Signs (24h Range):  Temp:  [97.9 °F (36.6 °C)-98.6 °F (37 °C)] 98.6 °F (37 °C)  Pulse:  [] 71  Resp:  [12-24] 18  SpO2:  [79 %-99 %] 99 %  BP: (101-148)/() 109/72     Weight: 64 kg (141 lb)  Body mass index is 25.79 kg/m².     Physical Exam  Vitals reviewed.   Constitutional:       General: She is not in acute distress.     Appearance: She is well-developed.   HENT:      Head: Normocephalic and atraumatic.   Eyes:      Extraocular Movements: Extraocular movements intact.      Pupils: Pupils are equal, round, and reactive to light.   Neck:      Vascular: No JVD.      Trachea: No tracheal deviation.   Cardiovascular:      Rate and Rhythm: Normal rate and regular rhythm.      Heart sounds: No murmur heard.     No friction rub. No gallop.   Pulmonary:      Effort: No respiratory distress.      Breath sounds:  Normal breath sounds. No wheezing or rales.   Abdominal:      General: Bowel sounds are normal. There is no distension.      Palpations: Abdomen is soft. There is no mass.      Tenderness: There is no abdominal tenderness.   Musculoskeletal:         General: No deformity.      Cervical back: Neck supple.   Lymphadenopathy:      Cervical: No cervical adenopathy.   Skin:     General: Skin is warm and dry.      Findings: No rash.   Neurological:      Mental Status: She is alert and oriented to person, place, and time.              CRANIAL NERVES     CN III, IV, VI   Pupils are equal, round, and reactive to light.       Significant Labs: All pertinent labs within the past 24 hours have been reviewed.    Significant Imaging: I have reviewed all pertinent imaging results/findings within the past 24 hours.

## 2025-04-04 NOTE — CODE/ RAPID DOCUMENTATION
RAPID RESPONSE NURSE NOTE        Admit Date: 2025  LOS: 2  Code Status: Full Code   Date of Consult: 2025  : 2000  Age: 24 y.o.  Weight:   Wt Readings from Last 1 Encounters:   25 64 kg (141 lb)     Sex: female  Race: Patient Refused   Bed: [address] [place] [address] A:   MRN: [id number]  Time Rapid Response Team page Received: 0152  Time Rapid Response Team at Bedside: 0154  Time Rapid Response Team left Bedside: 0235  Was the patient discharged from an ICU this admission? No   Was the patient discharged from a PACU within last 24 hours? No   Did the patient receive conscious sedation/general anesthesia in last 24 hours? No  Was the patient in the ED within the past 24 hours? Yes  Was the patient on NIPPV within the past 24 hours? No   Did this progress into an ARC or CPA: No  Attending Physician: Corey Daniels MD  Primary Service: [facility] D       SITUATION    Notified by Pager.  Reason for alert: Seizure  Called to evaluate the patient for Neuro.    BACKGROUND     Why is the patient in the hospital?: Seizure-like activity    Patient has a past medical history of Seizures.    Last Vitals:  Temp: 98.4 °F (36.9 °C) (2318)  Pulse: 76 (2318) Comment: end of   Resp: 18 (2318)  BP: 124/81 (2318)  SpO2: 99 % (2318)    24 Hours Vitals Range:  Temp:  [98.2 °F (36.8 °C)-98.5 °F (36.9 °C)]   Pulse:  []   Resp:  [14-25]   BP: ()/()   SpO2:  [98 %-100 %]     Labs:  Recent Labs     25  0339   WBC 10.03 7.50 7.52   HGB 12.5 13.3 11.2*   HCT 36.2* 39.7 33.3*    312 244       Recent Labs     25  0339    139 140   K 3.5 3.4* 3.8   * 114* 116*   CO2 20* 20* 20*   BUN 6 5* 5*   CREATININE 0.7 0.6 0.6   MG  --  1.9  --         ASSESSMENT    Called to assess pt for seizure like activity. Per bedside RN and charge RN, pt presented with full body rhythmic motions, HR 160s and Spo2  70's, non rebreather was placed on pt and pt turned on side. On RRN arrival to room, pt lying on side, eyes closed and non rebreather in place, no clinical presentation of seizure noted. Pt responsive to pain. EEG in place. Pt gradually became more responsive, c/o chest pain and headache. Bedside RN and visitor deny obvious injury during seizure like activity. HR 90, SpO2 100% on room air. Pt alert and oriented X4, GCS 15, prior to RRN leaving.      Physical Exam  Cardiovascular:      Rate and Rhythm: Regular rhythm. Tachycardia present.   Pulmonary:      Effort: Pulmonary effort is normal.   Skin:     General: Skin is warm.   Neurological:      GCS: GCS eye subscore is 2. GCS verbal subscore is 2. GCS motor subscore is 5.       INTERVENTIONS    The patient was seen for Neurological problem. Staff concerns included seizure-like activity. The following interventions were performed: seizure precautions, continuous cardiac monitoring continued, and EKG.    Per notes and Dr. He, pt to receive ativan if seizure activity confirmed with Epilepsy on EEG. Epilepsy contacted, awaiting call back. Discussed plan with Dr. He, if pt presents with seizure like activity again for greater than 5 minutes ok to give PRN ativan if unable to promptly get in touch with Epilepsy services for confirmation.     RECOMMENDATIONS    We recommend: Continuous telemetry monitoring, Maintain IV access, Seizure precautions, Ensure PRN medications for seizures are available and administered as needed, and Notify Rapid Response of decline in patient status.     Ensure Epilepsy and Neurology follow up for episode capture on EEG.     Neurocritical care consult if pt has prolonged seizure like activity again.     PROVIDER ESCALATION    Orders received and case discussed with Dr. He .    Primary team arrival time: N/A     Disposition: Remain in room 1122.    FOLLOW UP    Bedside nurse, Sybil ROBERTO  updated on plan of care.  Instructed to call the Rapid Response Nurse, Danae Dos Santos RN at [phone] for additional questions or concerns.

## 2025-04-04 NOTE — SUBJECTIVE & OBJECTIVE
Interval history: No overnight events.  Patient this morning had an episode lasting around 5 minutes.  Discussed with Neurology.  EEG started.  Urology concerned if patient's episodes are psychological.      Review of Systems   Constitutional:  Negative for activity change, appetite change, chills, fever and unexpected weight change.   HENT:  Negative for congestion and sore throat.    Respiratory:  Negative for cough and shortness of breath.    Cardiovascular:  Negative for chest pain, palpitations and leg swelling.   Gastrointestinal:  Negative for abdominal distention, abdominal pain, blood in stool, constipation, diarrhea, nausea and vomiting.   Genitourinary:  Negative for difficulty urinating, dysuria and hematuria.   Musculoskeletal:  Negative for arthralgias and myalgias.   Skin:  Negative for color change and rash.   Neurological:  Positive for seizures. Negative for dizziness and tremors.     Objective:     Vital Signs (Most Recent):  Temp: 98.5 °F (36.9 °C) (04/03/25 0712)  Pulse: 75 (04/03/25 1830)  Resp: 14 (04/03/25 1830)  BP: 110/82 (04/03/25 1830)  SpO2: 98 % (04/03/25 1830) Vital Signs (24h Range):  Temp:  [97.9 °F (36.6 °C)-98.7 °F (37.1 °C)] 98.5 °F (36.9 °C)  Pulse:  [] 75  Resp:  [14-25] 14  SpO2:  [98 %-100 %] 98 %  BP: ()/(57-83) 110/82     Weight: 64 kg (141 lb)  Body mass index is 25.79 kg/m².     Physical Exam  Vitals reviewed.   Constitutional:       General: She is not in acute distress.     Appearance: She is well-developed.   HENT:      Head: Normocephalic and atraumatic.   Eyes:      Extraocular Movements: Extraocular movements intact.      Pupils: Pupils are equal, round, and reactive to light.   Neck:      Vascular: No JVD.      Trachea: No tracheal deviation.   Cardiovascular:      Rate and Rhythm: Normal rate and regular rhythm.      Heart sounds: No murmur heard.     No friction rub. No gallop.   Pulmonary:      Effort: No respiratory distress.      Breath sounds:  Normal breath sounds. No wheezing or rales.   Abdominal:      General: Bowel sounds are normal. There is no distension.      Palpations: Abdomen is soft. There is no mass.      Tenderness: There is no abdominal tenderness.   Musculoskeletal:         General: No deformity.      Cervical back: Neck supple.   Lymphadenopathy:      Cervical: No cervical adenopathy.   Skin:     General: Skin is warm and dry.      Findings: No rash.   Neurological:      Mental Status: She is alert and oriented to person, place, and time.              CRANIAL NERVES     CN III, IV, VI   Pupils are equal, round, and reactive to light.       Significant Labs: All pertinent labs within the past 24 hours have been reviewed.    Significant Imaging: I have reviewed all pertinent imaging results/findings within the past 24 hours.

## 2025-04-04 NOTE — NURSING
Pt arrived to the unit. Ambulated to the bsc with sb assistance. EEG monitoring in place. AAOx4 on room air. Tele box in place #[id number] and pt sig other at the bedside.

## 2025-04-05 VITALS
TEMPERATURE: 99 F | OXYGEN SATURATION: 100 % | SYSTOLIC BLOOD PRESSURE: 110 MMHG | BODY MASS INDEX: 25.95 KG/M2 | WEIGHT: 141 LBS | HEART RATE: 84 BPM | HEIGHT: 62 IN | RESPIRATION RATE: 18 BRPM | DIASTOLIC BLOOD PRESSURE: 72 MMHG

## 2025-04-05 PROBLEM — E87.6 HYPOKALEMIA: Status: RESOLVED | Noted: 2025-04-02 | Resolved: 2025-04-05

## 2025-04-05 PROBLEM — E83.51 HYPOCALCEMIA: Status: RESOLVED | Noted: 2025-04-04 | Resolved: 2025-04-05

## 2025-04-05 PROBLEM — F44.5 PSYCHOGENIC NONEPILEPTIC SEIZURE: Status: ACTIVE | Noted: 2024-10-23

## 2025-04-05 LAB
ABSOLUTE EOSINOPHIL (OHS): 0.66 K/UL
ABSOLUTE MONOCYTE (OHS): 0.76 K/UL (ref 0.3–1)
ABSOLUTE NEUTROPHIL COUNT (OHS): 4.44 K/UL (ref 1.8–7.7)
ALBUMIN SERPL BCP-MCNC: 3.2 G/DL (ref 3.5–5.2)
ALP SERPL-CCNC: 53 UNIT/L (ref 40–150)
ALT SERPL W/O P-5'-P-CCNC: 12 UNIT/L (ref 10–44)
ANION GAP (OHS): 6 MMOL/L (ref 8–16)
AST SERPL-CCNC: 15 UNIT/L (ref 11–45)
BASOPHILS # BLD AUTO: 0.06 K/UL
BASOPHILS NFR BLD AUTO: 0.7 %
BILIRUB SERPL-MCNC: 0.2 MG/DL (ref 0.1–1)
BUN SERPL-MCNC: 5 MG/DL (ref 6–20)
CALCIUM SERPL-MCNC: 8.6 MG/DL (ref 8.7–10.5)
CHLORIDE SERPL-SCNC: 111 MMOL/L (ref 95–110)
CO2 SERPL-SCNC: 22 MMOL/L (ref 23–29)
CREAT SERPL-MCNC: 0.6 MG/DL (ref 0.5–1.4)
ERYTHROCYTE [DISTWIDTH] IN BLOOD BY AUTOMATED COUNT: 11.9 % (ref 11.5–14.5)
GFR SERPLBLD CREATININE-BSD FMLA CKD-EPI: >60 ML/MIN/1.73/M2
GLUCOSE SERPL-MCNC: 85 MG/DL (ref 70–110)
HCT VFR BLD AUTO: 36.9 % (ref 37–48.5)
HGB BLD-MCNC: 12.2 GM/DL (ref 12–16)
IMM GRANULOCYTES # BLD AUTO: 0.02 K/UL (ref 0–0.04)
IMM GRANULOCYTES NFR BLD AUTO: 0.2 % (ref 0–0.5)
LYMPHOCYTES # BLD AUTO: 2.68 K/UL (ref 1–4.8)
MCH RBC QN AUTO: 28.9 PG (ref 27–31)
MCHC RBC AUTO-ENTMCNC: 33.1 G/DL (ref 32–36)
MCV RBC AUTO: 87 FL (ref 82–98)
NUCLEATED RBC (/100WBC) (OHS): 0 /100 WBC
PLATELET # BLD AUTO: 282 K/UL (ref 150–450)
PMV BLD AUTO: 10.1 FL (ref 9.2–12.9)
POTASSIUM SERPL-SCNC: 3.7 MMOL/L (ref 3.5–5.1)
PROT SERPL-MCNC: 6.3 GM/DL (ref 6–8.4)
RBC # BLD AUTO: 4.22 M/UL (ref 4–5.4)
RELATIVE EOSINOPHIL (OHS): 7.7 %
RELATIVE LYMPHOCYTE (OHS): 31.1 % (ref 18–48)
RELATIVE MONOCYTE (OHS): 8.8 % (ref 4–15)
RELATIVE NEUTROPHIL (OHS): 51.5 % (ref 38–73)
SODIUM SERPL-SCNC: 139 MMOL/L (ref 136–145)
TOPIRAMATE SERPL-MCNC: <1 MCG/ML
WBC # BLD AUTO: 8.62 K/UL (ref 3.9–12.7)

## 2025-04-05 PROCEDURE — 80053 COMPREHEN METABOLIC PANEL: CPT | Performed by: HOSPITALIST

## 2025-04-05 PROCEDURE — 85025 COMPLETE CBC W/AUTO DIFF WBC: CPT | Performed by: HOSPITALIST

## 2025-04-05 PROCEDURE — 25000003 PHARM REV CODE 250: Performed by: INTERNAL MEDICINE

## 2025-04-05 PROCEDURE — 36415 COLL VENOUS BLD VENIPUNCTURE: CPT | Performed by: HOSPITALIST

## 2025-04-05 PROCEDURE — 25000003 PHARM REV CODE 250: Performed by: HOSPITALIST

## 2025-04-05 RX ORDER — CLOBAZAM 2.5 MG/ML
5 SUSPENSION ORAL NIGHTLY
Qty: 12 ML | Refills: 0 | Status: SHIPPED | OUTPATIENT
Start: 2025-04-05 | End: 2025-04-11

## 2025-04-05 RX ORDER — GABAPENTIN 300 MG/1
300 CAPSULE ORAL ONCE
Status: COMPLETED | OUTPATIENT
Start: 2025-04-05 | End: 2025-04-05

## 2025-04-05 RX ADMIN — GABAPENTIN 300 MG: 300 CAPSULE ORAL at 06:04

## 2025-04-05 RX ADMIN — TOPIRAMATE 25 MG: 25 TABLET, FILM COATED ORAL at 08:04

## 2025-04-05 RX ADMIN — SUMATRIPTAN SUCCINATE 50 MG: 50 TABLET ORAL at 12:04

## 2025-04-05 NOTE — ASSESSMENT & PLAN NOTE
-neurology now believes these are nonepileptic episodes.  Appreciate neurology recommendations.  Will continue Topamax.  Decreasing clobazam dose to 5 mg nightly for one week then to discontinue  -discontinuing EEG  -patient planning to initiate cognitive behavioral therapy with her psychologist in [facility]

## 2025-04-05 NOTE — PLAN OF CARE
[facility] Neurosurgery (Hospital)  Discharge Final Note    Primary Care Provider: No, Primary Doctor    Expected Discharge Date: 4/5/2025    Final Discharge Note (most recent)       Final Note - 04/05/25 1514          Final Note    Assessment Type Final Discharge Note     Anticipated Discharge Disposition Home or Self Care     What phone number can be called within the next 1-3 days to see how you are doing after discharge? [phone]     Hospital Resources/Appts/Education Provided Provided patient/caregiver with written discharge plan information;Appointments scheduled and added to AVS        Post-Acute Status    Patient choice form signed by patient/caregiver List with quality metrics by geographic area provided     Discharge Delays None known at this time                     Important Message from Medicare               Patient marked medically ready and is agreeable to discharge. Patient to discharge to home today and has transportation. All necessary follow up appointments have been scheduled and added to patient AVS. No other case management needs at this time.     Future Appointments   Date Time Provider Department Center   4/7/2025  5:30 PM [id number]   4/22/2025  4:15 PM [facility]   4/30/2025 10:30 AM Flaquita Oconnell MD [facility] NEURO MIRIAN KimW, MSW  Case Management  [facility]

## 2025-04-05 NOTE — PLAN OF CARE
Problem: Adult Inpatient Plan of Care  Goal: Plan of Care Review  Outcome: Progressing  Goal: Patient-Specific Goal (Individualized)  Outcome: Progressing  Goal: Optimal Comfort and Wellbeing  Outcome: Progressing     Problem: Adult Inpatient Plan of Care  Goal: Absence of Hospital-Acquired Illness or Injury  Intervention: Identify and Manage Fall Risk  Flowsheets (Taken 4/5/2025 9546)  Safety Promotion/Fall Prevention:   assistive device/personal item within reach   bed alarm set

## 2025-04-05 NOTE — PLAN OF CARE
[facility] unable to schedule PCP follow up. Patient will call and follow up with PCP.    [facility] Benitez  [phone]

## 2025-04-05 NOTE — NURSING
Patient had 3 witnessed episodes during shift lasting 2-minutes each. 2L NC placed. VSS stable. Provider and charge nurse Chaya notified.

## 2025-04-05 NOTE — DISCHARGE SUMMARY
[facility] ([facility])  [facility] Medicine  Discharge Summary      Patient Name: Annmarie Ruiz  MRN: [id number]  ASHA: [id number]  Patient Class: IP- Inpatient  Admission Date: 4/2/2025  Hospital Length of Stay: 3 days  Discharge Date and Time: 04/05/2025 3:28 PM  Attending Physician: Corey Daniels MD   Discharging Provider: Corey Daniels MD  Primary Care Provider: Nusrat, Primary Doctor  [facility] Medicine Team: [facility] MED D Corey Daniels MD  Primary Care Team: [facility]    HPI:   25 yo F with PMHx recently dx'd seizure disorder, migraines and MIMI presenting for evaluation of seizure-like activity. Pt. Reportedly had 2 seizures described as tonic clonic with full body convulsions and LOC. Episodes lasted about 3 mins, it is unclear if there was post-ictal period. Pt. Was also seen in ED yesterday after having a similar episode and per report she arrived post-ictal/lethargic at that time. She has not had any observed seizure-like activity while in ED during current visit or yesterday's visit. At time of my interview, pt. Resting comfortably and states she feels back to normal. She reports compliance with her home Onfi.    Per chart review pt. Had first seizure-like activity in August 2024, had done MRI and EEG in [facility] in December which were reportedly normal but started on Onfi at the time. Cardiac work-up for syncopal eventsreportedly normal as well. She has not yet had MRI/EEG in system. CT head was normal earlier this month.    * No surgery found *      Hospital Course:   Patient initiated on EEG.  Has had multiple episodes witnessed.  Not consistent with epileptic seizures.  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  Patient provided education on this condition.  She is planning to seek the care her psychiatry and psychology team in [facility]. Provided referrals to seek local care in the meantime.      Goals of Care Treatment Preferences:  Code  Status: Full Code      SDOH Screening:  The patient declined to be screened for utility difficulties, food insecurity, transport difficulties, housing insecurity, and interpersonal safety, so no concerns could be identified this admission.     Consults:   Consults (From admission, onward)          Status Ordering Provider     Inpatient Consult to Neurology Services (General Neurology)  Once        Provider:  (Not yet assigned)    Completed DENYS GUNTER            Assessment & Plan  Psychogenic nonepileptic seizure  -neurology now believes these are nonepileptic episodes.  Appreciate neurology recommendations.  Will continue Topamax.  Decreasing clobazam dose to 5 mg nightly for one week then to discontinue  -discontinuing EEG  -patient planning to initiate cognitive behavioral therapy with her psychologist in [facility]  Functional neurologic complaint      Hyperchloremic metabolic acidosis  Will continue to monitor.  May be related to IV fluids.  Provided lactated Ringer's instead of normal saline    Final Active Diagnoses:    Diagnosis Date Noted POA    PRINCIPAL PROBLEM:  Psychogenic nonepileptic seizure [F44.5] 10/23/2024 Yes    Functional neurologic complaint [R29.818] 04/04/2025 Yes    Hyperchloremic metabolic acidosis [E87.29] 04/04/2025 Yes      Problems Resolved During this Admission:    Diagnosis Date Noted Date Resolved POA    Hypocalcemia [E83.51] 04/04/2025 04/05/2025 Yes    Hypokalemia [E87.6] 04/02/2025 04/05/2025 Yes       Discharged Condition: good    Disposition: Home or Self Care    Follow Up:    Patient Instructions:      Ambulatory referral/consult to Social Work   Standing Status: Future   Referral Priority: Routine Referral Type: Consultation   Referral Reason: Specialty Services Required   Referred to Provider: BEN BETANCUR Requested Specialty: Licensed Clinical    Number of Visits Requested: 1     Ambulatory referral/consult to Psychiatry   Standing Status: Future    Referral Priority: Routine Referral Type: Psychiatric   Referral Reason: Specialty Services Required   Requested Specialty: Psychiatry   Number of Visits Requested: 1     Ambulatory referral/consult to Psychology   Standing Status: Future   Referral Priority: Routine Referral Type: Psychiatric   Referral Reason: Specialty Services Required   Requested Specialty: Psychology   Number of Visits Requested: 1     Diet Adult Regular     No driving until:   Order Comments: Cleared by Neurologist     Activity as tolerated       Significant Diagnostic Studies: Labs: CMP   Recent Labs   Lab 04/04/25  0657 04/05/25  0439    139   K 3.5 3.7   * 111*   CO2 20* 22*   BUN 5* 5*   CREATININE 0.6 0.6   CALCIUM 8.6* 8.6*   ALBUMIN 3.3* 3.2*   BILITOT 0.4 0.2   ALKPHOS 53 53   AST 15 15   ALT 11 12   ANIONGAP 6* 6*    and CBC   Recent Labs   Lab 04/04/25  0657 04/05/25  0439   WBC 8.81 8.62   HGB 12.7 12.2   HCT 38.2 36.9*    282     EEG REPORT        Annmarie Ruiz  [id number]  2000     DATE OF SERVICE: 4/3/2025      -1,1,2,3     METHODOLOGY      Extended electroencephalographic recording is made while the patient is ambulatory and continuing normal daily activities.  Electrodes are placed according to the International 10-20 placement system and included T1 and T2 electrode placement.  Twenty four (24) channels of digital signal (sampling rate of 512/sec) was simultaneously recorded from the scalp including EKG and eye monitors.  Recording band pass was 0.1 to 100 hz and all data was stored digitally on the recorder.  The patient is instructed to press an event button when clinical symptoms occur and write the symptoms into a diary. Activation procedures which include photic stimulation, hyperventilation and instructing patients to perform simple task are done in selected patients.                   The EEG is displayed on a monitor screen and can be reformatted into different montages for  evaluation.  The entire recoding is submitted for computer assisted analysis to detect spike and electrographic seizure activity.  The entire recording is visually reviewed and the times identified by computer analysis as being spikes or seizures are reviewed again.  Compresses spectral analysis (CSA) is also performed on the activity recorded from each individual channel.  This is displayed as a power display of frequencies from 0 to 30 Hz over time.   The CSA analysis is done and displayed continuously.  This is reviewed for asymmetries in power between homologous areas of the scalp and for presence of changes in power which canbe seen when seizures occur.  Sections of suspected abnormalities on the CSA is then compared with the original EEG recording.  .                [employer] software was also utilized in the review of this study.  This software suite analyzes the EEG recording in multiple domains.  Coherence and rhythmicity is computed to identify EEG sections which may contain organized seizures.  Each channel undergoes analysis to detect presence of spike and sharp waves which have special and morphological characteristic of epileptic activity.  The routine EEG recording is converted from spacial into frequency domain.  This is then displayed comparing homologous areas to identify areas of significant asymmetry.  Algorithm to identify non-cortically generated artifact is used to separate eye movement, EMG and other artifact from the EEG      Recording Times  Start on 4/3/2025  Stop on 4/4/2025     A total of 10:30:35 and 8:10:25 and 00:07:18 and 4:26:13 hours of EEG was recorded.       EEG FINDINGS:  Background activity:   The background rhythm was characterized by alpha and anterior dominant beta activity with a 10Hz posterior dominant alpha rhythm at 30-70 microvolts.   Symmetry and continuity: the background was continuous and symmetric     Sleep:   Normal sleep transients including sleep spindles, K  complexes, vertex waves and POSTS were seen.     Activation procedures:   NA     Abnormal activity:   No epileptiform discharges, periodic discharges, lateralized rhythmic delta activity or electrographic seizures were seen.     Events 1-3 (16:20 and 21:50)  Patient keeps eyes closed and begins shaking of the head progressing to irregular shaking of the arms and legs with some pelvic thrusting and intermittent coughing.  EEG shows continuation of normal waking rhythms only with movement artifact noted.     There are additional event marks at 14:20 and 17:55 with no clinical or EEG changes noted     IMPRESSION:   Normal one day video EEG with recording of three events which were non-epileptic, most likely psychogenic, in etiology.  No focal or epileptiform findings to suggest a concurrent diagnosis of epilepsy.        Romaine Barnes MD  Neurology-Epilepsy.  [facility].     Pending Diagnostic Studies:       Procedure Component Value Units Date/Time    Clobazam (ONFI) [[id number]] Collected: 04/02/25 1836    Order Status: Sent Lab Status: In process Updated: 04/02/25 1850    Specimen: Blood            Medications:  Reconciled Home Medications:      Medication List        START taking these medications      cloBAZam 2.5 mg/mL Susp  Commonly known as: ONFI  Take 2 mLs (5 mg total) by mouth every evening. for 6 days  Replaces: cloBAZam 10 mg Tab            CONTINUE taking these medications      drospirenone-ethinyl estradioL 3-0.02 mg per tablet  Commonly known as: SD  Take 1 tablet by mouth once daily.     folic acid 1 MG tablet  Commonly known as: FOLVITE  Take 4 tablets (4 mg total) by mouth once daily.     magnesium oxide 400 mg (241.3 mg magnesium) tablet  Commonly known as: MAG-OX  Take 2 tablets by mouth.     paroxetine 20 MG tablet  Commonly known as: PAXIL  Take 1 tablet (20 mg total) by mouth every morning.     prochlorperazine 10 MG tablet  Commonly known as: COMPAZINE  Take 10 mg by  mouth once.     riboflavin (vitamin B2) 100 mg Tab tablet  Commonly known as: VITAMIN B-2  Take 4 tablets (400 mg total) by mouth once daily.     sumatriptan 50 MG tablet  Commonly known as: IMITREX  Take 1 tablet (50 mg total) by mouth as needed for Migraine. Take 1 tablet (50 mg total) by mouth as needed at the start of the migraine. You can take another pill 2 hours after the first one if you still have a headache. DO NOT TAKE MORE THAN TWO PILLS IN A 24 HOUR PERIOD.     topiramate 25 MG tablet  Commonly known as: TOPAMAX  Take 1 tablet (25 mg total) by mouth once daily for 7 days, THEN 1 tablet (25 mg total) 2 (two) times daily.  Start taking on: March 26, 2025            STOP taking these medications      ALPRAZolam 0.5 MG Tb24  Commonly known as: XANAX XR     cloBAZam 10 mg Tab  Commonly known as: ONFI  Replaced by: cloBAZam 2.5 mg/mL Susp     tiZANidine 2 MG tablet  Commonly known as: ZANAFLEX              Indwelling Lines/Drains at time of discharge:   Lines/Drains/Airways       None                   Time spent on the discharge of patient: 45 minutes         Corey Daniels MD  Department of Hospital Medicine  [facility] Neurosurgery ([facility])

## 2025-04-07 ENCOUNTER — TELEPHONE (OUTPATIENT)
Dept: NEUROLOGY | Facility: CLINIC | Age: 25
End: 2025-04-07
Payer: COMMERCIAL

## 2025-04-07 ENCOUNTER — PATIENT MESSAGE (OUTPATIENT)
Dept: NEUROLOGY | Facility: CLINIC | Age: 25
End: 2025-04-07
Payer: COMMERCIAL

## 2025-04-07 NOTE — TELEPHONE ENCOUNTER
Received a message via secure chat alerting me EMU no longer needed per Dr. Edmondson. Canceled admission w/ Mariam in admitting, [id number].

## 2025-04-08 LAB
CLOBAZAM SERPL-MCNC: 320 NG/ML (ref 30–300)
NORCLOBAZAM SERPL-MCNC: 1320 NG/ML (ref 300–3000)

## 2025-04-09 ENCOUNTER — OFFICE VISIT (OUTPATIENT)
Dept: NEUROLOGY | Facility: CLINIC | Age: 25
End: 2025-04-09
Payer: COMMERCIAL

## 2025-04-09 DIAGNOSIS — R56.9 SEIZURE-LIKE ACTIVITY: ICD-10-CM

## 2025-04-09 DIAGNOSIS — F44.5 PSYCHOGENIC NONEPILEPTIC SEIZURE: Primary | ICD-10-CM

## 2025-04-09 NOTE — PROGRESS NOTES
Outpatient Rehab    Occupational Therapy Evaluation    Patient Name: Ramya Ruiz  MRN: [id number]  YOB: 2000  Encounter Date: 4/10/2025    Therapy Diagnosis: No diagnosis found.  Physician: Cresencio Luna DO    Physician Orders: Eval and Treat  Medical Diagnosis: Vestibular dysfunction of both ears  Dizziness    Visit # / Visits Authorized:   Insurance Authorization Period: 3/12/2025 to 3/12/2026  Date of Evaluation: 4/10/2025  Plan of Care Certification: 4/10/2025 to ***     Time In:     Time Out:    Total Time:     Total Billable Time:      Intake Outcome Measure for FOTO Survey    Therapist reviewed FOTO scores for Ramya Ruiz on 4/10/2025.   FOTO report - see Media section or FOTO account episode details.     Intake Score:  %         Subjective         History of Current Condition: Annmarie Ruiz is a 24 y.o. {MALE/FEMALE:36299} who presents to [facility] for evaluation and treatment secondary to ***. Deficits***.    Triggers: ***   Alleviating Factors: ***   Description of symptoms: *** (sensation of spinning vs lightheadedness)   Onset:***   Frequency: ***   Duration:***   Positional changes: ***   Limitations due to symptoms:***  Visual changes: ***   Hearing Changes (hearing loss or tinnitus): ***   Recent history of upper respiratory infection or GI infection: {yes no none known ***:66453}   Currently taking Meclizine: ***    History of migraines: ***    Systems screening  Cardiovascular indicators: {CV screening for Vestibular Evaluation:53455}    Is patient currently taking medication for stated indicators: {Yes No N/A Wildcard:80461}  Neurological: {Neurological Screen for Vestibular Eval:02166} {5 D's:27176}    Falls: ***    Surgical Procedure: ***  Imaging: {Mri/ctscan/bone scan:78607} ***  Previous Therapy: ***    Pain:  Pain Related Behaviors Observed: {YES/NO:20267} ***  Functional Pain Scale Rating 0-10:   {NUMBERS  1-10:46072}/10 on average  {NUMBERS 1-10:47056}/10 at best  {NUMBERS 1-10:22631}/10 at worst  Location: ***  Description: {Pain Description:81333}  Aggravating Factors: {Causes; Pain:78582}  Easing Factors: {Pain (activities that relieve):05200}    Occupation:  ***  Working presently: {Work history:50530}  Duties: ***    Functional Limitations/Social History:    Prior Level of Function: ***  Current Level of Function:***    Home/Living environment : {LIVES WITH:79837}  Home Access: ***  DME: {DME OWNED:66230}     Leisure: {AMB OT HAND LEISURE:67818}    Driving: ***    Functional Status:     Functional Mobility:  Bed mobility: {AMB OT NEURO ASSISTANCE:07137}  Roll to left: {AMB OT NEURO ASSISTANCE:71670}  Roll to right: {AMB OT NEURO ASSISTANCE:56403}  Supine to sit: {AMB OT NEURO ASSISTANCE:28600}  Sit to supine: {AMB OT NEURO ASSISTANCE:25839}  Transfers to bed: {AMB OT NEURO ASSISTANCE:89528}  Transfers to toilet: {AMB OT NEURO ASSISTANCE:66160}  Car transfers: {AMB OT NEURO ASSISTANCE:69304}  Wheelchair mobility: {AMB OT NEURO ASSISTANCE:27226}    ADL's:  Feeding: {AMB OT NEURO ASSISTANCE:51266}  Grooming: {AMB OT NEURO ASSISTANCE:57979}  Hygiene: {AMB OT NEURO ASSISTANCE:19891}  UB Dressing: {AMB OT NEURO ASSISTANCE:58903}  LB Dressing: {AMB OT NEURO ASSISTANCE:84721}  Toileting: {AMB OT NEURO ASSISTANCE:29385}  Bathing: {AMB OT NEURO ASSISTANCE:03792}    IADL's:  Homecare: {AMB OT NEURO ASSISTANCE:43693}  Cooking: {AMB OT NEURO ASSISTANCE:03450}  Laundry: {AMB OT NEURO ASSISTANCE:47896}  Yard work: {AMB OT NEURO ASSISTANCE:85892}  Use of telephone: {AMB OT NEURO ASSISTANCE:54238}  Money management: {AMB OT NEURO ASSISTANCE:57640}  Medication management: {AMB OT NEURO ASSISTANCE:93229}  Handwriting:{AMB OT NEURO ASSISTANCE:77290}  Technology Use:{AMB OT NEURO ASSISTANCE:18097}    Comments:        Past Medical History/Physical Systems Review:   Annmarie Ruiz  has a past medical history of Seizures.    Annmarie  "Ramya Ruiz  has a past surgical history that includes fusion, joint, wrist (Right).    Annmarie has a current medication list which includes the following prescription(s): clobazam, drospirenone-ethinyl estradiol, folic acid, magnesium oxide, paroxetine, prochlorperazine, riboflavin (vitamin b2), sumatriptan, and topiramate.    Review of patient's allergies indicates:   Allergen Reactions    Peanut Rash        Objective         Involved Side: ***  Dominant Side: { hand dominance:[id number]}  Date of Onset: ***    Patient's Goals for Therapy: ***    Cognitive Exam:  Oriented: {AMB OT NEURO COGNITIVE ORIENTED:39251}  Behaviors: {exam; behavior :48631::"normal","cooperative"}  Follows Commands/attention: {AMB OT NEURO COGNITIVE FOLLOWS COMMANDS:87034}  Communication: {AMB OT NEURO COGNITIVE COMMUNICATION:66641}  Memory: {AMB OT NEURO COGNITIVE MEMORY:96700} as determined by 3 word recall after 1 minute and 3 minutes  Safety awareness/insight to disability: {ONC PSO AWARENESS:40435}  Coping skills/emotional control: {AMB OT NEURO COGNITIVE COPING SKILLS:10795}    Oculomotor Exam:  Oculomotor ROM: ***  Eye Alignment: ***  Visual Field: ***  Acuity: ***    Spontaneous Nystagmus: ***  Gaze Holding Nystagmus: ***  Gaze Holding (No Fixation): ***  Smooth Pursuits:     Horizontal: ***   Vertical: ***  Saccades:    Horizontal: ***   Vertical: ***  Near Point Convergence (cm): ***    Point of Recovery (should be within 5 pts of NPC): ***  Gaze shift between near target (16") and far target (10ft)):  Accommodation (measures focus - monocular measurement of when target starts to blur; normal is </= 30 yrs </= 15 cm):    Right eye: ***  Left eye: ***   Fixation (5 second sustained visual attention): ***  VOR Cancellation: ***    VOR Slow Head Movement: ***  Head Thrust: ***  Dynamic Visual Acuity (DVA) (using *** chart): *** line difference (***, ***)   Head Shake: ***    Cover/Cross Cover: ***  Cover/Uncover: ***    Physical " Exam: ***WNL posture, cervical, and BUE range of motion. Strength and coordination not tested but no complaints of deficits.     Postural examination/scapula alignment: {AMB OT NEURO POSTURAL EXAM:32460}  Joint integrity: {AMB OT NEURO JOINT INTEGRITY:33056}  Skin integrity: {AMB OT NEURO SKIN INTEGRITY:59754}  Edema: {AMB OT NEURO EDEMA:93784}   Palpation: ***    Cervical Spine  Flexion *** degrees (80-90 deg)  Extension *** degrees (70-80 deg)  L side bend *** degrees, R side bend *** degrees (20-45 degrees)  L rotation *** degrees, R rotation *** degrees (70-90 degrees)  Are concurrent symptoms present with any of these movements***    Joint Evaluation  AROM  8/13/2021 PROM   8/13/2021 AROM  8/13/2021 PROM   8/13/2021    Left Left Right Right   Shoulder flex 0-180 *** *** *** ***   Shoulder Abd 0-180 *** *** *** ***   Shoulder ER 0-90 *** *** *** ***   Shoulder IR 0-90 *** *** *** ***   Shoulder Extension 0-80 *** *** *** ***   Shoulder Horizontal adduction 0-90 *** *** *** ***   Elbow flex/ext 0-150 *** *** *** ***   Wrist flex 0-80 *** *** *** ***   Wrist ext 0-70 *** *** *** ***   Supination 0-80 *** *** *** ***   Pronation 0-80 *** *** *** ***   UD *** *** *** ***   RD *** *** *** ***       Strength 8/13/2021 8/13/2021   **within available ROM** Left Right   Shoulder flex {AMB OT SHOULDER STRENGTH:54660} {AMB OT SHOULDER STRENGTH:07232}   Shoulder abd {AMB OT SHOULDER STRENGTH:76356} {AMB OT SHOULDER STRENGTH:06938}   Shoulder ER {AMB OT SHOULDER STRENGTH:96895} {AMB OT SHOULDER STRENGTH:95531}   Shoulder IR {AMB OT SHOULDER STRENGTH:54881} {AMB OT SHOULDER STRENGTH:09630}   Shoulder Extension {AMB OT SHOULDER STRENGTH:08474} {AMB OT SHOULDER STRENGTH:48201}   Shoulder Horizontal adduction {AMB OT SHOULDER STRENGTH:83584} {AMB OT SHOULDER STRENGTH:41794}   Elbow flex {AMB OT SHOULDER STRENGTH:27166} {AMB OT SHOULDER STRENGTH:49090}   Elbow ext {AMB OT SHOULDER STRENGTH:95034} {AMB OT SHOULDER STRENGTH:88486}  "  Wrist flex {AMB OT SHOULDER STRENGTH:67744} {AMB OT SHOULDER STRENGTH:57948}   Wrist ext {AMB OT SHOULDER STRENGTH:37589} {AMB OT SHOULDER STRENGTH:01051}   Supination {AMB OT SHOULDER STRENGTH:43885} {AMB OT SHOULDER STRENGTH:88328}   Pronation {AMB OT SHOULDER STRENGTH:38962} {AMB OT SHOULDER STRENGTH:83024}   UD {AMB OT SHOULDER STRENGTH:51384} {AMB OT SHOULDER STRENGTH:81354}   RD {AMB OT SHOULDER STRENGTH:33493} {AMB OT SHOULDER STRENGTH:38010}     Endurance Deficit: {severity:93280}    Balance/Functional Mobility Assessment:     GUERLINE Sensory Testing:  (P= Pass, F= Fail; note any sway; hold each position for 30")  Condition 1: (firm surface/feet together/eyes open) ***  Condition 2: (firm surface/feet together/eyes closed) ***  Condition 3: (firm surface/feet in tandem/eyes open) ***  Condition 4: (firm surface/feet in tandem/eyes closed) ***  Condition 5: (soft surface/feet together/eyes open) ***  Condition 6: (soft surface/feet together/eyes closed) ***  Condition 7: (Fukuda step test), measure distance varied from center starting position, > 30 deg deviation to either side indicates hypofunction of biased side ***    OR***    Postural control: MCTSIB: Evaluation 04/09/2025 (Average of 3 trials)   1. Eyes Open/feet together/Firm: *** seconds   2. Eyes Closed/feet together/Firm:  *** seconds   3. Eyes Open/feet together/Foam:  *** seconds   4. Eyes Closed/feet together/Foam:  *** seconds   TOTAL ***/120     Static Postural Control:   - Sharpened Romberg:    Eyes Open:   Eyes Closed:   - Single Leg Stance: (<10 sec = HIGH FALL RISK)   Left:    Right:      Evaluation   Single Limb Stance R LE ***  (<10 sec = HIGH FALL RISK)   Single Limb Stance L LE ***  (<10 sec = HIGH FALL RISK)      Evaluation   30 second Chair Rise  (adults > 61 y/o) *** completed {With/no:20238:o} arms   5 times sit-stand  (adults 18-63 y/o) ***seconds  >12 sec= fall risk for general elderly  >16 sec= fall risk for Parkinson's " disease  >10 sec= balance/vestibular dysfunction (<61 y/o)  >14.2 sec= balance/vestibular dysfunction (>61 y/o)  >12 sec= fall risk for CVA     Татьяна (measure distance varied from center starting position, > 30 deg deviation to either side indicates hypofunction of biased side):     Functional Gait Assessment:   1. Gait on level surface =  ***   (3) Normal: less than 5.5 sec, no A.D., no imbalance, normal gait pattern, deviates< 6in   (2) Mild impairment: 7-5.6 sec, uses A.D., mild gait deviations, or deviates 6-10 in   (1) Moderate impairment: > 7 sec, slow speed, imbalance, deviates 10-15 in.   (0) Severe impairment: needs assist, deviates >15 in, reach/touch wall  2. Change in Gait Speed = ***   (3) Normal: smooth change w/o loss of balance or gait deviation, deviates < 6 in, significant difference between speeds   (2) Mild impairment: changes speed, but demonstrates mild gait deviations, deviates 6-10 in, OR no deviations but unable to significantly speed, OR uses A.D.   (1) Moderate impairment: minor changes to speed, OR changes speed w/ significant deviations, deviates 10-15 in, OR  Changes speed , but loses balance & recovers   (0) Severe impairment: cannot change speed, deviates >15 in, or loses balance & needs assist  3. Gait with horizontal head turns  = ***   (3) Normal: no change in gait, deviates <6 in   (2) Mild impairment: slight change in speed, deviates 6-10 in, OR uses A.D.   (1) Moderate impairment: moderate change in speed, deviates 10-15 in   (0) Severe impairment: severe disruption of gait, deviates >15in  4. Gait with vertical head turns = ***   (3) Normal: no change in gait, deviates <6 in   (2) Mild impairment: slight change in speed, deviates 6-10 in OR uses A.D.   (1) Moderate impairment: moderate change in speed, deviates 10-15 in   (0) Severe impairment: severe disruption of gait, deviates >15 in  5. Gait with pivot turns = ***   (3) Normal: performs safely in 3 sec, no LOB   (2) Mild  impairment: performs in >3 sec & no LOB, OR turns safely & requires several steps to regain LOB   (1) Moderate impairment: turns slow, OR requires several small steps for balance following turn & stop   (0) Severe impairment: cannot turn safely, needs assist  6. Step over obstacle = ***   (3) Normal: steps over 2 stacked boxes w/o change in speed or LOB   (2) Mild impairment: able to step over 1 box w/o change in speed or LOB   (1) Moderate impairment: steps over 1 box but must slow down, may require VC   (0) Severe impairment: cannot perform w/o assist  7. Gait with Narrow MICHAEL = ***   (3) Normal: 10 steps no staggering   (2) Mild impairment: 7-9 steps   (1) Moderate impairment: 4-7 steps   (0) Severe impairment: < 4 steps or cannot perform w/o assist  8. Gait with eyes closed = ***   (3) Normal: < 7 sec, no A.D., no LOB, normal gait pattern, deviates <6 in   (2) Mild impairment: 7.1-9 sec, mild gait deviations, deviates 6-10 in   (1) Moderate impairment: > 9 sec, abnormal pattern, LOB, deviates 10-15 in   (0) Severe impairment: cannot perform w/o assist, LOB, deviates >15in  9. Ambulating Backwards = ***   (3) Normal: no A.D., no LOB, normal gait pattern, deviates <6in   (2) Mild impairment: uses A.D., slower speed, mild gait deviations, deviates 6-10 in   (1) Moderate impairment: slow speed, abnormal gait pattern, LOB, deviates 10-15 in   (0) Severe impairment: severe gait deviations or LOB, deviates >15in  10. Steps = ***   (3) Normal: alternating feet, no rail   (2) Mild Impairment: alternating feet, uses rail   (1) Moderate impairment: step-to, uses rail   (0) Severe impairment: cannot perform safely    Score ***/30     Score:   </=22/30 fall risk   <20/30 fall risk in older adults   <18/30 fall risk in Parkinsons     Functional Reach: ***    Modified VAS (Vertebral Artery Screen), in sitting (rotation, then extension):  R: ***  L: ***    Positional Canal Testing:   Looking for nystagmus (slow phase followed  "by quick phase to the affected side for BPPV)    Ponder Hallpike (posterior / CL anterior)   Right : {POSITIVE/NEGATIVE:13810} nystagmus, {POSITIVE/NEGATIVE:39406} dizziness   Left: {POSITIVE/NEGATIVE:53217} nystagmus, {POSITIVE/NEGATIVE:90129} dizziness  Horizontal Canals   Right: {POSITIVE/NEGATIVE:23272} nystagmus, {POSITIVE/NEGATIVE:75897} dizziness   Left: {POSITIVE/NEGATIVE:14603} nystagmus, {POSITIVE/NEGATIVE:49127} dizziness    Treatment Performed:    ***Epley maneuver performed to treat {LEFT/RIGHT:95680::" "} ***PSCC/HSSC/ASCC BPPV   Position 1: {cpposne} nystagmus lasting *** seconds; {cpposne} dizziness   Position 2: {cpposne} nystagmus lasting *** seconds; {cpposne} dizziness   Position 3: {cpposne} nystagmus lasting *** seconds; {cpposne} dizziness   Position 4: {cpposne} nystagmus lasting *** seconds; {cpposne} dizziness      Treatment:       Time Entry(in minutes):       Assessment & Plan        Patient's spiritual, cultural, and educational needs considered and patient agreeable to plan of care and goals.           Goals:     REGAN Hendrickson    "
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
## 2025-04-10 ENCOUNTER — PATIENT MESSAGE (OUTPATIENT)
Dept: NEUROLOGY | Facility: CLINIC | Age: 25
End: 2025-04-10
Payer: COMMERCIAL

## 2025-04-10 ENCOUNTER — CLINICAL SUPPORT (OUTPATIENT)
Dept: REHABILITATION | Facility: HOSPITAL | Age: 25
End: 2025-04-10
Attending: STUDENT IN AN ORGANIZED HEALTH CARE EDUCATION/TRAINING PROGRAM
Payer: COMMERCIAL

## 2025-04-10 DIAGNOSIS — H81.93 VESTIBULAR DYSFUNCTION OF BOTH EARS: ICD-10-CM

## 2025-04-10 DIAGNOSIS — R42 DIZZINESS: Primary | ICD-10-CM

## 2025-04-10 PROCEDURE — 97165 OT EVAL LOW COMPLEX 30 MIN: CPT | Mod: PO

## 2025-04-10 NOTE — PROGRESS NOTES
Outpatient Rehab    Occupational Therapy Evaluation (only)    Patient Name: Ramya Ruiz  MRN: [id number]  YOB: 2000  Encounter Date: 4/10/2025    Therapy Diagnosis:   Encounter Diagnoses   Name Primary?    Vestibular dysfunction of both ears     Dizziness Yes     Physician: Cresencio Luna DO    Physician Orders: Eval and Treat  Medical Diagnosis: Vestibular dysfunction of both ears  Dizziness    Visit # / Visits Authorized: 1 / 1  Insurance Authorization Period: 3/12/2025 to 3/12/2026  Date of Evaluation: 4/10/2025  Plan of Care Certification: 4/10/2025 to 4/10/2025; eval/discharge     Time In: 1346  Time Out: 1432  Total Time: 46 minutes  Total Billable Time:  46 minutes     Intake Outcome Measure for FOTO Survey    Therapist reviewed FOTO scores for Ramya Ruiz on 4/10/2025.   FOTO report - see Media section or FOTO account episode details.     Intake Score: 59%    Dizziness Handicap Inventory (DHI): 18/100  16-34= mild   36-52= moderate   >/= 54= severe       Subjective   History of Current Condition: Annmarie Ruiz is a 24 y.o. female who presents to [facility] for evaluation and treatment secondary to dizziness. Per discharge summary from hospital medicine on 4/5/2025, pt presented to the hospital on 4/2/2025 for seizure like activity. Pt reportedly had 2 episodes described as tonic clonic seizures with full body convulsions and LOC. Episodes lasted about 3 minutes; it is unclear if there was a post-ictal period. She had her first seizure-like activity in August 2024. She had done an MRI and EEG in [facility] in December 2024, which were reportedly normal. She was started on Onfi at that time. Cardiac work up for syncopal events were reportedly normal as well. Patient was initiated on EEG. Has had multiple episodes witnessed not consistent with epileptic seizures. She was diagnosed by neurology to have psychogenic nonepileptic events.  She was educated on this condition and was planning to seek the care with her psychiatry and psychology team in [facility]. She is moving back in May. She was provided resources to seek local care in the meantime and had her first visit with Rajendra Bautista yesterday. Of note, pt received vestibular PT prachi in October 2024 for complaints of dizziness and vision blurring with accompanying fainting spells when playing tennis. During today's evaluation, pt reported that her main complaints include worsened dizziness since recent seizure activity.  She reported that she doesn't feel normal when she walks. She feels she has lost mobility and strength in her right arm and is having pain in right arm and left leg. She reported that yesterday was the first day she felt like she could walk by herself. She reported that she's been having seizures for the past 8 days. She reported she is still taking Onfi but that she has been weaning off and this is her last week taking it.    Triggers: All movement and sometimes when she she looks away from her homework    Alleviating Factors: Moving slowly    Description of symptoms: Wooziness, lightheadedness, and imbalance (sensation of spinning vs lightheadedness)   Onset: 2024   Frequency: Daily    Duration: Constant    Limitations due to symptoms: All daily activities; is not attending school   Visual changes: Blurred vision    Hearing Changes (hearing loss or tinnitus): None stated    History of migraines: Yes; on medication     Systems screening  Cardiovascular indicators: None    Is patient currently taking medication for stated indicators: N/A  Neurological: Psychogenic seizures     Falls: 0    Activities of Daily Living      General Prior Level of Function Comments: Independent  General Current Level of Function Comments: Requiring assistance from sister for ADLs and IADLs and supervision for functional/community mobility       Previously independent with activities of daily  living? Yes     Currently independent with activities of daily living? No     Sister is assisting her with ADLs as needed due to pain in right arm and left leg post recent seizure activity. She is sitting down to shower. Today was the first day since hospitalization that she has gone to the bathroom by herself.     Previously independent with instrumental activities of daily living? Yes     Currently independent with instrumental activities of daily living? No     Sister has been completing household tasks such as cooking and cleaning.         Pain     Patient reports a current pain level of 9/10.     Location: Right arm and left leg; lower back (5-6/10)  Pain Qualities: Other (Comment)  Other Pain Qualities: Soreness  Pain-Relieving Factors: Heat         Living Arrangements  Living Situation  Living Arrangements: Roommate    1 story apartment; lives with 3 roommates; her sister came in town 2 days ago and has been helping her      Employment      Senior at [employer]; graduating     Past Medical History/Physical Systems Review:   Annmarie Ruiz  has a past medical history of Seizures.    Annmarie Ruiz  has a past surgical history that includes fusion, joint, wrist (Right).    Annmarie has a current medication list which includes the following prescription(s): clobazam, drospirenone-ethinyl estradiol, folic acid, magnesium oxide, paroxetine, prochlorperazine, riboflavin (vitamin b2), sumatriptan, and topiramate.    Review of patient's allergies indicates:   Allergen Reactions    Peanut Rash        Objective         Involved Side: N/A  Date of Onset: Dizziness onset 2024 but worse since recent onset seizures     Patient's Goals for Therapy: None stated     Cognitive Exam:  Oriented: Person, Place, Time, and Situation  Behaviors: normal, cooperative  Follows Commands/attention: Follows multistep  commands  Communication: clear/fluent  Memory: No Deficits noted but not formally assessed   Safety awareness/insight  "to disability: aware of diagnosis, treatment, and prognosis  Coping skills/emotional control: Appropriate to situation    Oculomotor Exam:  Oculomotor ROM: WNL  Eye Alignment: WNL  Visual Field: NT  Acuity: Wears glasses     Spontaneous Nystagmus: None  Gaze Holding Nystagmus: None  Gaze Holding (No Fixation): NT  Smooth Pursuits: Pt reported (--) dizziness but that tracking made her feel "weird; like my body is moving but it's not"    Horizontal: Eye movements intact    Vertical: Eye movements intact   Saccades:    Horizontal: Eye movements intact; no dizziness    Vertical: Eye movements intact; pt reported feeling "confused'   Near Point Convergence (cm): WNL   VOR Cancellation: No visual slippage; no dizziness; pt reported she felt like she had to stay focused to maintain sitting balance, but feet were not flat on floor     VORx1 at 180 bpm:   Horizontal: No visual slippage; no dizziness     Physical Exam: WNL posture, cervical (other than rotation right). BUE range of motion, strength and coordination not tested but no complaints of deficits.     Balance/Functional Mobility Assessment:     Spencer Sensory Testing:  (P= Pass, F= Fail; note any sway; hold each position for 30")  Condition 1: (firm surface/feet together/eyes open) P; 30 sec  Condition 2: (firm surface/feet together/eyes closed) P; 30 sec, slight sway   Condition 3: (firm surface/feet in tandem/eyes open) P; 30 sec, RLE forward   Condition 4: (firm surface/feet in tandem/eyes closed) F; 22 sec, LLE forward  Condition 5: (soft surface/feet together/eyes open) P; 30 sec  Condition 6: (soft surface/feet together/eyes closed) P; 30 sec, no abnormal sway   Condition 7: (Fukuda step test), measure distance varied from center starting position, > 30 deg deviation to either side indicates hypofunction of biased side NT    Functional Gait Assessment:   1. Gait on level surface =  2; 7 sec, pt reported back pain is making her walk slower    (3) Normal: less than " 5.5 sec, no A.D., no imbalance, normal gait pattern, deviates< 6in   (2) Mild impairment: 7-5.6 sec, uses A.D., mild gait deviations, or deviates 6-10 in   (1) Moderate impairment: > 7 sec, slow speed, imbalance, deviates 10-15 in.   (0) Severe impairment: needs assist, deviates >15 in, reach/touch wall  2. Change in Gait Speed = 3   (3) Normal: smooth change w/o loss of balance or gait deviation, deviates < 6 in, significant difference between speeds   (2) Mild impairment: changes speed, but demonstrates mild gait deviations, deviates 6-10 in, OR no deviations but unable to significantly speed, OR uses A.D.   (1) Moderate impairment: minor changes to speed, OR changes speed w/ significant deviations, deviates 10-15 in, OR  Changes speed , but loses balance & recovers   (0) Severe impairment: cannot change speed, deviates >15 in, or loses balance & needs assist  3. Gait with horizontal head turns  = 3   (3) Normal: no change in gait, deviates <6 in   (2) Mild impairment: slight change in speed, deviates 6-10 in, OR uses A.D.   (1) Moderate impairment: moderate change in speed, deviates 10-15 in   (0) Severe impairment: severe disruption of gait, deviates >15in  4. Gait with vertical head turns = 3 but pt reported feeling off balance    (3) Normal: no change in gait, deviates <6 in   (2) Mild impairment: slight change in speed, deviates 6-10 in OR uses A.D.   (1) Moderate impairment: moderate change in speed, deviates 10-15 in   (0) Severe impairment: severe disruption of gait, deviates >15 in  5. Gait with pivot turns = 3   (3) Normal: performs safely in 3 sec, no LOB   (2) Mild impairment: performs in >3 sec & no LOB, OR turns safely & requires several steps to regain LOB   (1) Moderate impairment: turns slow, OR requires several small steps for balance following turn & stop   (0) Severe impairment: cannot turn safely, needs assist  6. Step over obstacle = 3   (3) Normal: steps over 2 stacked boxes w/o change in  speed or LOB   (2) Mild impairment: able to step over 1 box w/o change in speed or LOB   (1) Moderate impairment: steps over 1 box but must slow down, may require VC   (0) Severe impairment: cannot perform w/o assist  7. Gait with Narrow MICHAEL = 3   (3) Normal: 10 steps no staggering   (2) Mild impairment: 7-9 steps   (1) Moderate impairment: 4-7 steps   (0) Severe impairment: < 4 steps or cannot perform w/o assist  8. Gait with eyes closed = 2; deviated right ~2ft and slowed speed   (3) Normal: < 7 sec, no A.D., no LOB, normal gait pattern, deviates <6 in   (2) Mild impairment: 7.1-9 sec, mild gait deviations, deviates 6-10 in   (1) Moderate impairment: > 9 sec, abnormal pattern, LOB, deviates 10-15 in   (0) Severe impairment: cannot perform w/o assist, LOB, deviates >15in  9. Ambulating Backwards = 3   (3) Normal: no A.D., no LOB, normal gait pattern, deviates <6in   (2) Mild impairment: uses A.D., slower speed, mild gait deviations, deviates 6-10 in   (1) Moderate impairment: slow speed, abnormal gait pattern, LOB, deviates 10-15 in   (0) Severe impairment: severe gait deviations or LOB, deviates >15in  10. Steps = 3   (3) Normal: alternating feet, no rail   (2) Mild Impairment: alternating feet, uses rail   (1) Moderate impairment: step-to, uses rail   (0) Severe impairment: cannot perform safely    Score 28/30     Score:   </=22/30 fall risk   <20/30 fall risk in older adults   <18/30 fall risk in Parkinsons     Time Entry(in minutes):  OT Evaluation (Low) Time Entry: 46    Assessment & Plan   Assessment  Ramya presents with a condition of Low complexity.   Presentation of Symptoms: Stable  Will Comorbidities Impact Care: No          Functional Limitations: Pain with ADLs/IADLs, Other (Comment) Ambulation and dizziness                  Assessment Details: Annmarie Ruiz is a 24 y.o. female referred to outpatient occupational therapy and presents with a medical diagnosis of dizziness. Symptoms have been  "worse since recent seizure like activity. Pt reported today was the first day she has left the house and able to move around more. Oculomotor function WNL, but smooth pursuits elicited an "odd sensation like her body was moving" and she reported that vertical saccades made her feel confused. Static and dynamic balance were WNL. Pt passed all conditions of the GUERLINE Sensory Test except for condition 4 (tandem stance EC) and condition 7 was not administered. FGA score of 28/30 does not place pt in elevated risk for falls category. Of note, pt did demonstrate some limping and slowed gait speed, which were associated with soreness and pain in lower extremities and back. Due to pt's high level of performance and no notable balance or oculomotor deficits, pt does not need follow up occupational therapy. Encouraged pt to complete independent stretches to address muscle soreness and administered oculomotor HEP for horizontal/vertical pursuits and vertical saccades to address the above symptoms elicited during assessment. Pt understanding and agreeable with this plan.      Plan  From an occupational therapy perspective, the patient would not benefit from: Skilled Rehab Services           Patient's spiritual, cultural, and educational needs considered and patient agreeable to plan of care and goals.     Education  Education was done with Patient. The patient's learning style includes Demonstration, Listening, and Pictures/video. The patient Verbalizes understanding.         Pt educated on horizontal/vertical smooth pursuits and vertical saccades HEP to be completed independently        Goals: No goals; pt discharged     Caroline Roper OT    "

## 2025-04-10 NOTE — PATIENT INSTRUCTIONS
Track thumb side to side and then up and down, x30 seconds each direction. 2 times per day     Hold one thumb at head height and one thumb at chest height. Look back and forth between the two. 30 seconds. 2 times per day                       Retrieved from:

## 2025-04-15 ENCOUNTER — PATIENT MESSAGE (OUTPATIENT)
Dept: NEUROLOGY | Facility: CLINIC | Age: 25
End: 2025-04-15

## 2025-04-15 ENCOUNTER — OFFICE VISIT (OUTPATIENT)
Dept: NEUROLOGY | Facility: CLINIC | Age: 25
End: 2025-04-15
Payer: COMMERCIAL

## 2025-04-15 DIAGNOSIS — F44.5 PSYCHOGENIC NONEPILEPTIC SEIZURE: Primary | ICD-10-CM

## 2025-04-15 PROCEDURE — 90837 PSYTX W PT 60 MINUTES: CPT | Mod: 95,,, | Performed by: SOCIAL WORKER

## 2025-04-15 NOTE — PROGRESS NOTES
Individual Psychotherapy (PhD/LCSW)    4/15/2025    Site:  Telemed     Location: [place]    Therapeutic Intervention: Met with patient.  Outpatient - Insight oriented psychotherapy 60 min - CPT code 84727, Outpatient - Behavior modifying psychotherapy 60 min - CPT code 39916, and Outpatient - Supportive psychotherapy 60 min - CPT Code 50755    Chief complaint/reason for encounter: depression, anxiety, somatic, and interpersonal     Interval history and content of current session:   Patient began by acknowledged an Event last wed and TH and that's good    She acknowledges Experiencing mood changes and she feels she is not herself.  She feels as if her personality has changed.     She also reports she is tired at end of day and does not want to take naps.     LCSW asked patient to acknowledged Feeling LOST.    What are the changes: health, tennis, graduating, friendships,     LCSW and patient reviewed sense of self and identity. LCSW also sent information via portal for review.     If you ever find yourself thinking I don't know who I am, you might wonder why you might feel this way and what you can do to change that.     The theorist Edwin Reddy believed that the period of adolescence played a particularly important role in the formation of a person's identity. He described this stage of life as one of identity vs. role confusion and believed that people who are able to commit to a strong identity emerge with a solid sense of self, while those who struggle may be left wondering who they are as they enter young adulthood.    Get to know yourself better, including your interests, likes, and dislikes  Figure out what's important to you, including your beliefs, values, and goals    Paitent receptive and agrees to ongoing virtual visits. Patient making progress in regard to triggers and action.  LCSW sent information with a summary and clinical recommendations for patient to share with her psychologist.      Treatment plan:  Target symptoms: recurrent depression, anxiety , adjustment, grief, work stress  Why chosen therapy is appropriate versus another modality: relevant to diagnosis, patient responds to this modality, evidence based practice  Outcome monitoring methods: self-report, observation  Therapeutic intervention type: insight oriented psychotherapy, behavior modifying psychotherapy, supportive psychotherapy    Risk parameters:  Patient reports no suicidal ideation  Patient reports no homicidal ideation  Patient reports no self-injurious behavior  Patient reports no violent behavior    Verbal deficits: None    Patient's response to intervention:  The patient's response to intervention is accepting.    Progress toward goals and other mental status changes:  The patient's progress toward goals is fair .    Diagnosis:     ICD-10-CM ICD-9-CM   1. Psychogenic nonepileptic seizure  F44.5 300.11       Plan:  individual psychotherapy    Return to clinic: 1 week    Length of Service (minutes):  75

## 2025-04-21 ENCOUNTER — ATHLETIC TRAINING SESSION (OUTPATIENT)
Dept: SPORTS MEDICINE | Facility: CLINIC | Age: 25
End: 2025-04-21
Payer: COMMERCIAL

## 2025-04-21 DIAGNOSIS — Z00.00 HEALTHCARE MAINTENANCE: Primary | ICD-10-CM

## 2025-04-22 ENCOUNTER — OFFICE VISIT (OUTPATIENT)
Dept: NEUROLOGY | Facility: CLINIC | Age: 25
End: 2025-04-22
Payer: COMMERCIAL

## 2025-04-22 DIAGNOSIS — F44.5 PSYCHOGENIC NONEPILEPTIC SEIZURE: Primary | ICD-10-CM

## 2025-04-22 NOTE — PROGRESS NOTES
Individual Psychotherapy (PhD/LCSW)    4/22/2025    Site:  Telemed     Location: [place]     Therapeutic Intervention: Met with patient.  Outpatient - Insight oriented psychotherapy 60 min - CPT code 56102, Outpatient - Behavior modifying psychotherapy 60 min - CPT code 27309, and Outpatient - Supportive psychotherapy 60 min - CPT Code 80368    Chief complaint/reason for encounter: depression, anxiety, and interpersonal     Interval history and content of current session:   Patient began session by reporting she thought she was getting better in regard to seizures, but she reports ongoing symptoms.     She notes feeling stressed and trying to prevent a seizure from happening by distracting.     She did acknowledge that she received news that although she turned in some work, she failed a class. She acknowledges she can still graduate but will have to take additional courses in the summer.     Patient also verbalized not wanting to walk in graduation ceremony.  She noted this was already her feeling prior to the seizures but now more so.     LCSW expressed empathy and support. LCSW encouraged assertive communication and perhaps compromizing with family.  She notes that she may travel to [place] to visit with family as opposed to graduation ceremony.  LCSW encouraged patient to write down the plans and she will talk to to family this week.     Reviewed sense of self and looking into new areas to explore in regard to learning, etc. LCSW also encouraged review of internal triggers such as not wanting to walk in graduation but also wanting to do it for family members.  She noted she thought she may get out of it due to a school scheduling issue, but LCSW strongly encouraged assertive communication.  Patient practice CBT techniques in vivo in regard to her GPA and overall school performance. LCSW encouraged continued use. Patient receptive and agrees to visit in a week.   Treatment plan:  Target symptoms: depression,  anxiety , adjustment  Why chosen therapy is appropriate versus another modality: relevant to diagnosis, patient responds to this modality, evidence based practice  Outcome monitoring methods: self-report, observation  Therapeutic intervention type: insight oriented psychotherapy, behavior modifying psychotherapy, supportive psychotherapy    Risk parameters:  Patient reports no suicidal ideation  Patient reports no homicidal ideation  Patient reports no self-injurious behavior  Patient reports no violent behavior    Verbal deficits: None    Patient's response to intervention:  The patient's response to intervention is accepting.    Progress toward goals and other mental status changes:  The patient's progress toward goals is fair , limited.    Diagnosis:     ICD-10-CM ICD-9-CM   1. Psychogenic nonepileptic seizure  F44.5 300.11       Plan:  individual psychotherapy    Return to clinic: 2 weeks    Length of Service (minutes): 60

## 2025-04-22 NOTE — PROGRESS NOTES
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    Ramya is here today for general strengthening exercises since being hospitalized.    Handedness: right-handed  Sport played: tennis      Level: college              Assessment:     Status: O - Out    Date Seen:  04/14/2025    Date of Injury:  04/01/2025    Date Out:  04/01/2025    Date Cleared:  n/a    Treatment/Rehab/Maintenance:     Ramya completed:    []  INJURY TREATMENT   [x]  MAINTENANCE  DATE OF SERVICE: 04/14/2025    INJURY/CONDITON: General Strength + Right Shoulder    Ramya received the selected modalities after being cleared for contradictions.  Ramya received education on potenital side effects of the selected modalities and agreed to treatment.      MODALITIES:    Cryotherapy / Thermotherapy Duration  (Mins) Add. Tx Parameters / Comment   []Cold Tub / Whirlpool (50-60 F)     []Contrast Bath (105-110 F & 50-65 F)     []Game Ready     [x]Hot Pack 20mins Right Shoulder   []Hot Tub / Whirlpool ( F)     []Ice Massage     []Ice Pack     []Paraffin Wax (126-130 F)     []Vapocoolant Wellpinit              Electrotherapy Waveform   (AC/DC) Modulation (Cont./Interrupted/Surged) Intensity   (V) Pulse Width/Dur.  (uS) Pulse Rate/Freq.  (Hz, PPS or CPS) Duration  (Mins) Add. Tx Parameters / Comment   []Combo          [x]E-Stim - IFC     80 - 150 Hz 20mins Right Shoulder   []E-Stim - Premod          []E-Stim - Croatian          []E-Stim - TENS          []E-Stim - Other          []Iontophoresis        Meds:         THERAPEUTIC EXERCISES:    Stretching Cardio Rehab Other   []Stretching - Active []Cardio - Bike []Rehab - Ankle/Foot []Agility []PNF   []Stretching - Dynamic []Cardio - Elliptical []Rehab - Knee []Balance []ROM - Active   []Stretching - Passive []Cardio - Jog/Run []Rehab - Hip []Blood Flow  Restriction []ROM - Passive   []Stretching - PNF []Cardio - Treadmill []Rehab - Wrist/Hand []Foam Roller []RTP - Concussion Protocol   []Stretching - Static []Cardio - Upper Body Ergometer []Rehab - Elbow []Functional Exercises []RTP - Sport Specific    []Cardio - Walk []Rehab - Shoulder []Joint Mobilization [x]Strengthening Exercises     []Rehab - Neck/Spine []Manual Therapy []Other:     []Rehab - Back []Plyometric Exercises      []Rehab - Other             Warm-Up Reps/Sets/Time Weight #   bike 5 min    Static stretching 3 x 20s hold                Exercise Reps/Sets/Time Weight #   Bodyweight Squat 3 x 10 5s hold   Walking Lunges 3 x 10    Lunge Pulses 2 x 20    Calf Raises on shuttle 3 x 10    Wall Pushups 3 x 10    Dead Bugs 2 x 30 seconds                          Comment:  Ramya tolerated all exercises well, with no need for moderation. She acknowledges that her body has become very weak since having the seizures.    Plan:     1. Rest as needed, progress exercises as tolerated  2. Physician Referral: no  3. ED Referral:no  4. Parent/Guardian Notified: No  5. All questions were answered, ath. will contact me for questions or concerns in the interim.  6.         Eligible to use School Insurance: No, not a school related injury

## 2025-05-01 ENCOUNTER — OFFICE VISIT (OUTPATIENT)
Dept: NEUROLOGY | Facility: CLINIC | Age: 25
End: 2025-05-01
Payer: COMMERCIAL

## 2025-05-01 DIAGNOSIS — F44.5 PSYCHOGENIC NONEPILEPTIC SEIZURE: Primary | ICD-10-CM

## 2025-05-01 DIAGNOSIS — F41.9 ANXIETY: ICD-10-CM

## 2025-05-01 PROCEDURE — 90837 PSYTX W PT 60 MINUTES: CPT | Mod: 95,,, | Performed by: SOCIAL WORKER

## 2025-05-01 NOTE — PROGRESS NOTES
"Individual Psychotherapy (PhD/LCSW)    2025    Site:  Telemed     Location: [place]     Therapeutic Intervention: Met with patient.  Outpatient - Insight oriented psychotherapy 60 min - CPT code 59039, Outpatient - Behavior modifying psychotherapy 60 min - CPT code 09690, and Outpatient - Supportive psychotherapy 60 min - CPT Code 12989    Chief complaint/reason for encounter: depression, anger, anxiety, somatic, and interpersonal     Interval history and content of current session:   She began visit by reporting she has made decisions in her life.   She noted she decided to not walk in graduation and to take a trip to [place] with family. She did note she was told the day after she was nto going to be able to walk due to a pending class. She reports she was happy she made the decision.     She reported she Had 2 seizures in the past week.  LCSW asked patient to identify the stressors.   Stressors:saying goodbye, packing,financial  Monday and Tuesday - cried and she had to do the tickets for flights for her family.   She noted internal stressors "Do everything "by herself - air bnb, flights.     She also noted another internal stressor, Talking to mom and making mom sad -   " I have to cry alone and have a seizure"     She reports now feeling calm and peaceful and LCSW reviewed that the patient now has many stressors and tasks behind her.     I dont want to feel because it will cause a seizure  Not talking to anyone     It's not hard to say goodbye  It's the place , school, everything pending  What are you feeling? LOST , empty, and a ?    She reports some guilt and blame in regards to her seizures and diagnosis.  LCSW asked patient to review the Why? And identify the stressors in the past few years.   Parents divorce  Grandma  -   Family member cancer  Family member cacner  Sister trauma.     LCSW asked patient to practice compassion and jorge for self.   Another major internal stressors is: After all this " she feels like they are not proud of her.    Patient making much progress.  LCSW encouraged ongoing assertive communication.  Agreed to visit in 2 weeks.   Treatment plan:  Target symptoms: depression, anxiety , adjustment, grief, college / school stress  Why chosen therapy is appropriate versus another modality: relevant to diagnosis, patient responds to this modality, evidence based practice  Outcome monitoring methods: self-report, observation  Therapeutic intervention type: insight oriented psychotherapy, behavior modifying psychotherapy, supportive psychotherapy    Risk parameters:  Patient reports no suicidal ideation  Patient reports no homicidal ideation  Patient reports no self-injurious behavior  Patient reports no violent behavior    Verbal deficits: None    Patient's response to intervention:  The patient's response to intervention is accepting, motivated.    Progress toward goals and other mental status changes:  The patient's progress toward goals is good.    Diagnosis:     ICD-10-CM ICD-9-CM   1. Psychogenic nonepileptic seizure  F44.5 300.11   2. Anxiety  F41.9 300.00       Plan:  individual psychotherapy    Return to clinic: 2 weeks    Length of Service (minutes): 75

## 2025-05-02 ENCOUNTER — PATIENT MESSAGE (OUTPATIENT)
Dept: NEUROLOGY | Facility: CLINIC | Age: 25
End: 2025-05-02
Payer: COMMERCIAL

## 2025-05-02 PROBLEM — F41.9 ANXIETY: Status: ACTIVE | Noted: 2025-05-02

## 2025-05-15 ENCOUNTER — OFFICE VISIT (OUTPATIENT)
Dept: NEUROLOGY | Facility: CLINIC | Age: 25
End: 2025-05-15
Payer: COMMERCIAL

## 2025-05-15 DIAGNOSIS — F41.9 ANXIETY: ICD-10-CM

## 2025-05-15 DIAGNOSIS — F44.5 PSYCHOGENIC NONEPILEPTIC SEIZURE: Primary | ICD-10-CM

## 2025-05-15 NOTE — PROGRESS NOTES
"Individual Psychotherapy (PhD/LCSW)    5/15/2025    Site:  Telemed     Location: OUT OF [facility] [place]     Therapeutic Intervention: Met with patient.  NON - BILLABLE     Chief complaint/reason for encounter: anger, anxiety, and interpersonal     Interval history and content of current session:   Had a seizure last Saturday and also lat night.   Trigger may have been exhaustion - spending time with people.     She is the  - she likes being the leader but does not want the responsibility   LCSW noted that this is a double bind and a good indicator of an internal stressor.  These internal stressor trigger [employer].     They expect her to be the leader in many aspects of her life and family's life.     LCSW offered the took away her choice. Patient immediately resonated with this and noted this is true.   She wonders how she "got here" and LCSW acknowledged patient's hx of being a good student, a good athlete. She continued to push forward with accolades, awards and praise until she no longer found enjoyment in the activities.  These goals and pressures took away her choice, similar to her choice now spending time with family on vacation.     Patient initially noted it was exhaustion, but now she understands it can be exhaustion and the internal triggers related to her family and the expectations.     Patient is actually out of state and this visit will not be billed at this time. Patient and LCSW agreed to virtual visit in two weeks.       Treatment plan:  Target symptoms: adjustment, family stress   Why chosen therapy is appropriate versus another modality: relevant to diagnosis, patient responds to this modality, evidence based practice  Outcome monitoring methods: self-report, observation  Therapeutic intervention type: insight oriented psychotherapy, behavior modifying psychotherapy, supportive psychotherapy    Risk parameters:  Patient reports no suicidal ideation  Patient reports no homicidal " ideation  Patient reports no self-injurious behavior  Patient reports no violent behavior    Verbal deficits: None    Patient's response to intervention:  The patient's response to intervention is accepting.    Progress toward goals and other mental status changes:  The patient's progress toward goals is good.    Diagnosis:     ICD-10-CM ICD-9-CM   1. Psychogenic nonepileptic seizure  F44.5 300.11   2. Anxiety  F41.9 300.00       Plan:  individual psychotherapy    Return to clinic: 2 weeks    Length of Service (minutes): 60

## 2025-05-27 ENCOUNTER — OFFICE VISIT (OUTPATIENT)
Dept: NEUROLOGY | Facility: CLINIC | Age: 25
End: 2025-05-27
Payer: COMMERCIAL

## 2025-05-27 DIAGNOSIS — F44.5 PSYCHOGENIC NONEPILEPTIC SEIZURE: Primary | ICD-10-CM

## 2025-05-27 PROCEDURE — 99499 UNLISTED E&M SERVICE: CPT | Mod: 95,,, | Performed by: SOCIAL WORKER

## 2025-05-27 NOTE — PROGRESS NOTES
Individual Psychotherapy (PhD/LCSW)    5/27/2025    Site:  Telemed       The patient location is: [place] -   patient did not return to [place] and this will not be billed.   The chief complaint leading to consultation is: anxiety   Visit type: audiovisual    Face to Face time with patient: 60 minutes   70 minutes of total time spent on the encounter, which includes face to face time and non-face to face time preparing to see the patient (eg, review of tests), Obtaining and/or reviewing separately obtained history, Documenting clinical information in the electronic or other health record, Independently interpreting results (not separately reported) and communicating results to the patient/family/caregiver, or Care coordination (not separately reported).       Each patient to whom he or she provides medical services by telemedicine is:  (1) informed of the relationship between the physician and patient and the respective role of any other health care provider with respect to management of the patient; and (2) notified that he or she may decline to receive medical services by telemedicine and may withdraw from such care at any time.    Therapeutic Intervention: Met with patient.  NON BILLABLE - patient out of state    Chief complaint/reason for encounter: anxiety, somatic, and interpersonal     Interval history and content of current session:   Patient began session by acknowledging she had a seizure.  She reports some family conflict and she reports her sister and mother had an argument.     She also reports in regard to her feelings, she sometimes feels hurt by things her mother states.     She also is stressed since she is returning to [facility] tomorrow. She is not sure of what she wants to do. Leaving and she feels emptly  Analogy: fill the cup, empty book or chapter. She can now fill in the chapter or book.   LCSW Recommend she write things down. Does not want to get down or depressed  LCSW asked what are you  "going to do? She will do activities  - guitar, yoga, pilates    She is also distressed about her sister since her sister asks if nothing bothers her even though she had a traumatic incident. She talks to anyone, goes out to clubs and bars, leaves with a sal. LCSW asked patient to acknowledge the stressors.   She stated "I don't want anything to happen to her ." LCSW noted this is a powerful trigger.     Obie reports she has appointments pending in [facility] with multiple providers.  No further visits with this LCSW and LCSW did provide his contact number and email or updates. She is grateful to LCSW as she notes she felt heard and that not every therapist or clinician have not always understood her.     Treatment plan:  Target symptoms: anxiety , adjustment  Why chosen therapy is appropriate versus another modality: relevant to diagnosis, patient responds to this modality, evidence based practice  Outcome monitoring methods: self-report, observation  Therapeutic intervention type: insight oriented psychotherapy, behavior modifying psychotherapy, supportive psychotherapy    Risk parameters:  Patient reports no suicidal ideation  Patient reports no homicidal ideation  Patient reports no self-injurious behavior  Patient reports no violent behavior    Verbal deficits: None    Patient's response to intervention:  The patient's response to intervention is accepting, motivated.    Progress toward goals and other mental status changes:  The patient's progress toward goals is excellent, good.    Diagnosis:     ICD-10-CM ICD-9-CM   1. Psychogenic nonepileptic seizure  F44.5 300.11       Plan:  individual psychotherapy, consult psychiatrist for medication evaluation, and patient will follow with providers in [facility].     Return to clinic: psychotherapy completed and terminated at this time. Patient moving out of state and out of country.     Length of Service (minutes): 60              "